# Patient Record
Sex: MALE | Race: WHITE | NOT HISPANIC OR LATINO | Employment: OTHER | URBAN - NONMETROPOLITAN AREA
[De-identification: names, ages, dates, MRNs, and addresses within clinical notes are randomized per-mention and may not be internally consistent; named-entity substitution may affect disease eponyms.]

---

## 2022-01-01 ENCOUNTER — NURSING HOME (OUTPATIENT)
Dept: INTERNAL MEDICINE | Facility: CLINIC | Age: 87
End: 2022-01-01

## 2022-01-01 ENCOUNTER — NURSING HOME (OUTPATIENT)
Dept: FAMILY MEDICINE CLINIC | Facility: CLINIC | Age: 87
End: 2022-01-01

## 2022-01-01 ENCOUNTER — APPOINTMENT (OUTPATIENT)
Dept: GENERAL RADIOLOGY | Facility: HOSPITAL | Age: 87
End: 2022-01-01

## 2022-01-01 ENCOUNTER — APPOINTMENT (OUTPATIENT)
Dept: CARDIOLOGY | Facility: HOSPITAL | Age: 87
End: 2022-01-01

## 2022-01-01 ENCOUNTER — APPOINTMENT (OUTPATIENT)
Dept: CT IMAGING | Facility: HOSPITAL | Age: 87
End: 2022-01-01

## 2022-01-01 ENCOUNTER — HOSPITAL ENCOUNTER (INPATIENT)
Facility: HOSPITAL | Age: 87
LOS: 4 days | End: 2022-08-06
Attending: FAMILY MEDICINE | Admitting: INTERNAL MEDICINE

## 2022-01-01 ENCOUNTER — TRANSCRIBE ORDERS (OUTPATIENT)
Dept: GENERAL RADIOLOGY | Facility: HOSPITAL | Age: 87
End: 2022-01-01

## 2022-01-01 ENCOUNTER — HOSPITAL ENCOUNTER (OUTPATIENT)
Dept: GENERAL RADIOLOGY | Facility: HOSPITAL | Age: 87
Discharge: HOME | End: 2022-07-05
Admitting: NURSE PRACTITIONER

## 2022-01-01 ENCOUNTER — HOSPITAL ENCOUNTER (EMERGENCY)
Facility: HOSPITAL | Age: 87
Discharge: HOME | End: 2022-07-21
Attending: EMERGENCY MEDICINE | Admitting: EMERGENCY MEDICINE

## 2022-01-01 ENCOUNTER — HOSPITAL ENCOUNTER (INPATIENT)
Facility: HOSPITAL | Age: 87
LOS: 20 days | Discharge: NURSING, REHAB OR LONG-TERM CARE | End: 2022-06-14
Attending: EMERGENCY MEDICINE | Admitting: INTERNAL MEDICINE

## 2022-01-01 ENCOUNTER — DOCUMENTATION (OUTPATIENT)
Dept: FAMILY MEDICINE CLINIC | Facility: CLINIC | Age: 87
End: 2022-01-01

## 2022-01-01 VITALS
WEIGHT: 199.8 LBS | SYSTOLIC BLOOD PRESSURE: 128 MMHG | TEMPERATURE: 98 F | RESPIRATION RATE: 18 BRPM | HEART RATE: 74 BPM | DIASTOLIC BLOOD PRESSURE: 72 MMHG | BODY MASS INDEX: 29.49 KG/M2 | OXYGEN SATURATION: 98 %

## 2022-01-01 VITALS
DIASTOLIC BLOOD PRESSURE: 52 MMHG | BODY MASS INDEX: 30.89 KG/M2 | TEMPERATURE: 98.3 F | HEIGHT: 69 IN | WEIGHT: 208.56 LBS | OXYGEN SATURATION: 90 % | SYSTOLIC BLOOD PRESSURE: 115 MMHG | HEART RATE: 71 BPM | RESPIRATION RATE: 24 BRPM

## 2022-01-01 VITALS
RESPIRATION RATE: 18 BRPM | WEIGHT: 210 LBS | OXYGEN SATURATION: 97 % | HEART RATE: 79 BPM | BODY MASS INDEX: 31.1 KG/M2 | HEIGHT: 69 IN | DIASTOLIC BLOOD PRESSURE: 74 MMHG | SYSTOLIC BLOOD PRESSURE: 157 MMHG | TEMPERATURE: 98.4 F

## 2022-01-01 VITALS
BODY MASS INDEX: 29.21 KG/M2 | RESPIRATION RATE: 18 BRPM | SYSTOLIC BLOOD PRESSURE: 128 MMHG | TEMPERATURE: 97.4 F | DIASTOLIC BLOOD PRESSURE: 74 MMHG | HEART RATE: 80 BPM | WEIGHT: 197.9 LBS | OXYGEN SATURATION: 97 %

## 2022-01-01 VITALS
RESPIRATION RATE: 18 BRPM | DIASTOLIC BLOOD PRESSURE: 66 MMHG | BODY MASS INDEX: 29.49 KG/M2 | OXYGEN SATURATION: 94 % | SYSTOLIC BLOOD PRESSURE: 132 MMHG | WEIGHT: 199.8 LBS | TEMPERATURE: 97.4 F | HEART RATE: 70 BPM

## 2022-01-01 VITALS
BODY MASS INDEX: 30.6 KG/M2 | WEIGHT: 206.57 LBS | RESPIRATION RATE: 22 BRPM | DIASTOLIC BLOOD PRESSURE: 53 MMHG | HEART RATE: 87 BPM | SYSTOLIC BLOOD PRESSURE: 141 MMHG | TEMPERATURE: 98.5 F | OXYGEN SATURATION: 95 % | HEIGHT: 69 IN

## 2022-01-01 DIAGNOSIS — E11.9 TYPE 2 DIABETES MELLITUS WITHOUT COMPLICATION, WITHOUT LONG-TERM CURRENT USE OF INSULIN: ICD-10-CM

## 2022-01-01 DIAGNOSIS — J18.9 MULTIFOCAL PNEUMONIA: ICD-10-CM

## 2022-01-01 DIAGNOSIS — Z79.4 TYPE 2 DIABETES MELLITUS WITHOUT COMPLICATION, WITH LONG-TERM CURRENT USE OF INSULIN: ICD-10-CM

## 2022-01-01 DIAGNOSIS — N17.9 AKI (ACUTE KIDNEY INJURY): ICD-10-CM

## 2022-01-01 DIAGNOSIS — U07.1 PNEUMONIA DUE TO COVID-19 VIRUS: Primary | ICD-10-CM

## 2022-01-01 DIAGNOSIS — R06.02 SHORTNESS OF BREATH: ICD-10-CM

## 2022-01-01 DIAGNOSIS — Z86.16 PERSONAL HISTORY OF COVID-19: ICD-10-CM

## 2022-01-01 DIAGNOSIS — J96.01 ACUTE RESPIRATORY FAILURE WITH HYPOXIA: Primary | ICD-10-CM

## 2022-01-01 DIAGNOSIS — Z87.01 HISTORY OF PNEUMONIA: Primary | ICD-10-CM

## 2022-01-01 DIAGNOSIS — R09.02 HYPOXIA: ICD-10-CM

## 2022-01-01 DIAGNOSIS — N40.0 BENIGN PROSTATIC HYPERPLASIA WITHOUT LOWER URINARY TRACT SYMPTOMS: ICD-10-CM

## 2022-01-01 DIAGNOSIS — J40 BRONCHITIS, NOT SPECIFIED AS ACUTE OR CHRONIC: ICD-10-CM

## 2022-01-01 DIAGNOSIS — J18.9 PNEUMONIA OF BOTH LOWER LOBES DUE TO INFECTIOUS ORGANISM: ICD-10-CM

## 2022-01-01 DIAGNOSIS — Z86.16 HISTORY OF COVID-19: ICD-10-CM

## 2022-01-01 DIAGNOSIS — J18.9 PNEUMONIA DUE TO INFECTIOUS ORGANISM, UNSPECIFIED LATERALITY, UNSPECIFIED PART OF LUNG: ICD-10-CM

## 2022-01-01 DIAGNOSIS — I10 ESSENTIAL HYPERTENSION: ICD-10-CM

## 2022-01-01 DIAGNOSIS — J12.82 PNEUMONIA DUE TO COVID-19 VIRUS: Primary | ICD-10-CM

## 2022-01-01 DIAGNOSIS — R53.1 GENERALIZED WEAKNESS: Primary | ICD-10-CM

## 2022-01-01 DIAGNOSIS — I10 ESSENTIAL HYPERTENSION: Primary | ICD-10-CM

## 2022-01-01 DIAGNOSIS — E11.9 TYPE 2 DIABETES MELLITUS WITHOUT COMPLICATION, WITH LONG-TERM CURRENT USE OF INSULIN: ICD-10-CM

## 2022-01-01 DIAGNOSIS — R09.02 HYPOXIA: Primary | ICD-10-CM

## 2022-01-01 DIAGNOSIS — Z87.09 HISTORY OF RESPIRATORY FAILURE: ICD-10-CM

## 2022-01-01 LAB
A-A DO2: 26.2 MMHG
A-A DO2: 31.9 MMHG
A-A DO2: 52.9 MMHG
ALBUMIN SERPL-MCNC: 2.7 G/DL (ref 3.5–5.2)
ALBUMIN SERPL-MCNC: 2.8 G/DL (ref 3.5–5.2)
ALBUMIN SERPL-MCNC: 3 G/DL (ref 3.5–5.2)
ALBUMIN SERPL-MCNC: 3 G/DL (ref 3.5–5.2)
ALBUMIN SERPL-MCNC: 3.1 G/DL (ref 3.5–5.2)
ALBUMIN SERPL-MCNC: 3.1 G/DL (ref 3.5–5.2)
ALBUMIN/GLOB SERPL: 0.8 G/DL
ALBUMIN/GLOB SERPL: 0.8 G/DL
ALBUMIN/GLOB SERPL: 1 G/DL
ALBUMIN/GLOB SERPL: 1.1 G/DL
ALP SERPL-CCNC: 112 U/L (ref 39–117)
ALP SERPL-CCNC: 113 U/L (ref 39–117)
ALP SERPL-CCNC: 59 U/L (ref 39–117)
ALP SERPL-CCNC: 69 U/L (ref 39–117)
ALP SERPL-CCNC: 69 U/L (ref 39–117)
ALP SERPL-CCNC: 71 U/L (ref 39–117)
ALT SERPL W P-5'-P-CCNC: 13 U/L (ref 1–41)
ALT SERPL W P-5'-P-CCNC: 27 U/L (ref 1–41)
ALT SERPL W P-5'-P-CCNC: 29 U/L (ref 1–41)
ALT SERPL W P-5'-P-CCNC: 33 U/L (ref 1–41)
ALT SERPL W P-5'-P-CCNC: 34 U/L (ref 1–41)
ALT SERPL W P-5'-P-CCNC: 35 U/L (ref 1–41)
ANION GAP SERPL CALCULATED.3IONS-SCNC: 10 MMOL/L (ref 5–15)
ANION GAP SERPL CALCULATED.3IONS-SCNC: 10.1 MMOL/L (ref 5–15)
ANION GAP SERPL CALCULATED.3IONS-SCNC: 10.3 MMOL/L (ref 5–15)
ANION GAP SERPL CALCULATED.3IONS-SCNC: 11.1 MMOL/L (ref 5–15)
ANION GAP SERPL CALCULATED.3IONS-SCNC: 11.2 MMOL/L (ref 5–15)
ANION GAP SERPL CALCULATED.3IONS-SCNC: 11.5 MMOL/L (ref 5–15)
ANION GAP SERPL CALCULATED.3IONS-SCNC: 11.5 MMOL/L (ref 5–15)
ANION GAP SERPL CALCULATED.3IONS-SCNC: 11.7 MMOL/L (ref 5–15)
ANION GAP SERPL CALCULATED.3IONS-SCNC: 11.7 MMOL/L (ref 5–15)
ANION GAP SERPL CALCULATED.3IONS-SCNC: 11.8 MMOL/L (ref 5–15)
ANION GAP SERPL CALCULATED.3IONS-SCNC: 12.6 MMOL/L (ref 5–15)
ANION GAP SERPL CALCULATED.3IONS-SCNC: 12.6 MMOL/L (ref 5–15)
ANION GAP SERPL CALCULATED.3IONS-SCNC: 12.8 MMOL/L (ref 5–15)
ANION GAP SERPL CALCULATED.3IONS-SCNC: 15 MMOL/L (ref 5–15)
ANION GAP SERPL CALCULATED.3IONS-SCNC: 15.4 MMOL/L (ref 5–15)
ANION GAP SERPL CALCULATED.3IONS-SCNC: 7.9 MMOL/L (ref 5–15)
ANION GAP SERPL CALCULATED.3IONS-SCNC: 8.7 MMOL/L (ref 5–15)
ARTERIAL PATENCY WRIST A: ABNORMAL
ARTERIAL PATENCY WRIST A: POSITIVE
ARTERIAL PATENCY WRIST A: POSITIVE
AST SERPL-CCNC: 13 U/L (ref 1–40)
AST SERPL-CCNC: 18 U/L (ref 1–40)
AST SERPL-CCNC: 25 U/L (ref 1–40)
AST SERPL-CCNC: 41 U/L (ref 1–40)
AST SERPL-CCNC: 43 U/L (ref 1–40)
AST SERPL-CCNC: 46 U/L (ref 1–40)
ATMOSPHERIC PRESS: 733 MMHG
ATMOSPHERIC PRESS: 734 MMHG
ATMOSPHERIC PRESS: 738 MMHG
BACTERIA BLD CULT: ABNORMAL
BACTERIA SPEC AEROBE CULT: ABNORMAL
BACTERIA SPEC AEROBE CULT: ABNORMAL
BACTERIA SPEC AEROBE CULT: NORMAL
BACTERIA SPEC AEROBE CULT: NORMAL
BACTERIA UR QL AUTO: ABNORMAL /HPF
BACTERIA UR QL AUTO: ABNORMAL /HPF
BASE EXCESS BLDA CALC-SCNC: -0.2 MMOL/L (ref 0–2)
BASE EXCESS BLDA CALC-SCNC: 1.1 MMOL/L (ref 0–2)
BASE EXCESS BLDA CALC-SCNC: 1.6 MMOL/L (ref 0–2)
BASOPHILS # BLD AUTO: 0.02 10*3/MM3 (ref 0–0.2)
BASOPHILS # BLD AUTO: 0.02 10*3/MM3 (ref 0–0.2)
BASOPHILS # BLD AUTO: 0.03 10*3/MM3 (ref 0–0.2)
BASOPHILS # BLD AUTO: 0.05 10*3/MM3 (ref 0–0.2)
BASOPHILS # BLD AUTO: 0.06 10*3/MM3 (ref 0–0.2)
BASOPHILS NFR BLD AUTO: 0.2 % (ref 0–1.5)
BASOPHILS NFR BLD AUTO: 0.3 % (ref 0–1.5)
BASOPHILS NFR BLD AUTO: 0.6 % (ref 0–1.5)
BDY SITE: ABNORMAL
BH CV ECHO MEAS - AO MAX PG: 8.4 MMHG
BH CV ECHO MEAS - AO MEAN PG: 5 MMHG
BH CV ECHO MEAS - AO ROOT DIAM: 3.3 CM
BH CV ECHO MEAS - AO V2 MAX: 145 CM/SEC
BH CV ECHO MEAS - AO V2 VTI: 32.3 CM
BH CV ECHO MEAS - AVA(I,D): 2.6 CM2
BH CV ECHO MEAS - EDV(CUBED): 58 ML
BH CV ECHO MEAS - EDV(MOD-SP2): 76.8 ML
BH CV ECHO MEAS - EDV(MOD-SP4): 83.5 ML
BH CV ECHO MEAS - EF(MOD-BP): 70.2 %
BH CV ECHO MEAS - EF(MOD-SP2): 71.7 %
BH CV ECHO MEAS - EF(MOD-SP4): 66.1 %
BH CV ECHO MEAS - ESV(CUBED): 16.4 ML
BH CV ECHO MEAS - ESV(MOD-SP2): 21.7 ML
BH CV ECHO MEAS - ESV(MOD-SP4): 28.3 ML
BH CV ECHO MEAS - FS: 34.4 %
BH CV ECHO MEAS - IVS/LVPW: 1.26 CM
BH CV ECHO MEAS - IVSD: 1.72 CM
BH CV ECHO MEAS - LA DIMENSION: 2.47 CM
BH CV ECHO MEAS - LAT PEAK E' VEL: 9.7 CM/SEC
BH CV ECHO MEAS - LV MASS(C)D: 231.7 GRAMS
BH CV ECHO MEAS - LV MAX PG: 3.8 MMHG
BH CV ECHO MEAS - LV MEAN PG: 2 MMHG
BH CV ECHO MEAS - LV V1 MAX: 96.9 CM/SEC
BH CV ECHO MEAS - LV V1 VTI: 22.5 CM
BH CV ECHO MEAS - LVIDD: 3.9 CM
BH CV ECHO MEAS - LVIDS: 2.5 CM
BH CV ECHO MEAS - LVOT AREA: 3.7 CM2
BH CV ECHO MEAS - LVOT DIAM: 2.16 CM
BH CV ECHO MEAS - LVPWD: 1.36 CM
BH CV ECHO MEAS - MED PEAK E' VEL: 4.9 CM/SEC
BH CV ECHO MEAS - MV A MAX VEL: 94.8 CM/SEC
BH CV ECHO MEAS - MV DEC SLOPE: 369 CM/SEC2
BH CV ECHO MEAS - MV DEC TIME: 0.16 MSEC
BH CV ECHO MEAS - MV E MAX VEL: 83.7 CM/SEC
BH CV ECHO MEAS - MV E/A: 0.88
BH CV ECHO MEAS - MV MAX PG: 4.2 MMHG
BH CV ECHO MEAS - MV MEAN PG: 2 MMHG
BH CV ECHO MEAS - MV P1/2T: 90.5 MSEC
BH CV ECHO MEAS - MV V2 VTI: 31.7 CM
BH CV ECHO MEAS - MVA(P1/2T): 2.43 CM2
BH CV ECHO MEAS - MVA(VTI): 2.6 CM2
BH CV ECHO MEAS - PA V2 MAX: 115 CM/SEC
BH CV ECHO MEAS - RAP SYSTOLE: 3 MMHG
BH CV ECHO MEAS - RV MAX PG: 3.4 MMHG
BH CV ECHO MEAS - RV V1 MAX: 92.2 CM/SEC
BH CV ECHO MEAS - RV V1 VTI: 20.3 CM
BH CV ECHO MEAS - SV(LVOT): 82.4 ML
BH CV ECHO MEAS - SV(MOD-SP2): 55.1 ML
BH CV ECHO MEAS - SV(MOD-SP4): 55.2 ML
BH CV ECHO MEAS - TAPSE (>1.6): 2.12 CM
BH CV ECHO MEASUREMENTS AVERAGE E/E' RATIO: 11.47
BH CV XLRA - RV BASE: 3.1 CM
BH CV XLRA - RV LENGTH: 7.4 CM
BH CV XLRA - TDI S': 10.3 CM/SEC
BILIRUB SERPL-MCNC: 0.3 MG/DL (ref 0–1.2)
BILIRUB SERPL-MCNC: 0.4 MG/DL (ref 0–1.2)
BILIRUB SERPL-MCNC: 0.5 MG/DL (ref 0–1.2)
BILIRUB SERPL-MCNC: 0.6 MG/DL (ref 0–1.2)
BILIRUB UR QL STRIP: NEGATIVE
BILIRUB UR QL STRIP: NEGATIVE
BUN SERPL-MCNC: 15 MG/DL (ref 8–23)
BUN SERPL-MCNC: 18 MG/DL (ref 8–23)
BUN SERPL-MCNC: 21 MG/DL (ref 8–23)
BUN SERPL-MCNC: 23 MG/DL (ref 8–23)
BUN SERPL-MCNC: 24 MG/DL (ref 8–23)
BUN SERPL-MCNC: 28 MG/DL (ref 8–23)
BUN SERPL-MCNC: 29 MG/DL (ref 8–23)
BUN SERPL-MCNC: 29 MG/DL (ref 8–23)
BUN SERPL-MCNC: 32 MG/DL (ref 8–23)
BUN SERPL-MCNC: 35 MG/DL (ref 8–23)
BUN SERPL-MCNC: 39 MG/DL (ref 8–23)
BUN SERPL-MCNC: 43 MG/DL (ref 8–23)
BUN SERPL-MCNC: 43 MG/DL (ref 8–23)
BUN SERPL-MCNC: 44 MG/DL (ref 8–23)
BUN SERPL-MCNC: 59 MG/DL (ref 8–23)
BUN SERPL-MCNC: 74 MG/DL (ref 8–23)
BUN SERPL-MCNC: 79 MG/DL (ref 8–23)
BUN/CREAT SERPL: 14.4 (ref 7–25)
BUN/CREAT SERPL: 14.9 (ref 7–25)
BUN/CREAT SERPL: 16.1 (ref 7–25)
BUN/CREAT SERPL: 17.2 (ref 7–25)
BUN/CREAT SERPL: 18 (ref 7–25)
BUN/CREAT SERPL: 24.2 (ref 7–25)
BUN/CREAT SERPL: 27.5 (ref 7–25)
BUN/CREAT SERPL: 27.6 (ref 7–25)
BUN/CREAT SERPL: 28.5 (ref 7–25)
BUN/CREAT SERPL: 30.2 (ref 7–25)
BUN/CREAT SERPL: 36.1 (ref 7–25)
BUN/CREAT SERPL: 36.1 (ref 7–25)
BUN/CREAT SERPL: 36.4 (ref 7–25)
BUN/CREAT SERPL: 37.9 (ref 7–25)
BUN/CREAT SERPL: 47.2 (ref 7–25)
BUN/CREAT SERPL: 50 (ref 7–25)
BUN/CREAT SERPL: 57.7 (ref 7–25)
CALCIUM SPEC-SCNC: 7.8 MG/DL (ref 8.6–10.5)
CALCIUM SPEC-SCNC: 7.8 MG/DL (ref 8.6–10.5)
CALCIUM SPEC-SCNC: 7.9 MG/DL (ref 8.6–10.5)
CALCIUM SPEC-SCNC: 8.1 MG/DL (ref 8.6–10.5)
CALCIUM SPEC-SCNC: 8.4 MG/DL (ref 8.6–10.5)
CALCIUM SPEC-SCNC: 8.4 MG/DL (ref 8.6–10.5)
CALCIUM SPEC-SCNC: 8.6 MG/DL (ref 8.6–10.5)
CALCIUM SPEC-SCNC: 8.6 MG/DL (ref 8.6–10.5)
CALCIUM SPEC-SCNC: 8.7 MG/DL (ref 8.6–10.5)
CALCIUM SPEC-SCNC: 8.7 MG/DL (ref 8.6–10.5)
CALCIUM SPEC-SCNC: 8.8 MG/DL (ref 8.6–10.5)
CALCIUM SPEC-SCNC: 8.8 MG/DL (ref 8.6–10.5)
CALCIUM SPEC-SCNC: 8.9 MG/DL (ref 8.6–10.5)
CALCIUM SPEC-SCNC: 9.2 MG/DL (ref 8.6–10.5)
CALCIUM SPEC-SCNC: 9.2 MG/DL (ref 8.6–10.5)
CHLORIDE SERPL-SCNC: 101 MMOL/L (ref 98–107)
CHLORIDE SERPL-SCNC: 101 MMOL/L (ref 98–107)
CHLORIDE SERPL-SCNC: 103 MMOL/L (ref 98–107)
CHLORIDE SERPL-SCNC: 104 MMOL/L (ref 98–107)
CHLORIDE SERPL-SCNC: 105 MMOL/L (ref 98–107)
CHLORIDE SERPL-SCNC: 106 MMOL/L (ref 98–107)
CHLORIDE SERPL-SCNC: 107 MMOL/L (ref 98–107)
CHLORIDE SERPL-SCNC: 108 MMOL/L (ref 98–107)
CHLORIDE SERPL-SCNC: 99 MMOL/L (ref 98–107)
CHLORIDE SERPL-SCNC: 99 MMOL/L (ref 98–107)
CLARITY UR: CLEAR
CLARITY UR: CLEAR
CO2 SERPL-SCNC: 19.6 MMOL/L (ref 22–29)
CO2 SERPL-SCNC: 20 MMOL/L (ref 22–29)
CO2 SERPL-SCNC: 20.9 MMOL/L (ref 22–29)
CO2 SERPL-SCNC: 21.2 MMOL/L (ref 22–29)
CO2 SERPL-SCNC: 21.5 MMOL/L (ref 22–29)
CO2 SERPL-SCNC: 21.7 MMOL/L (ref 22–29)
CO2 SERPL-SCNC: 22.2 MMOL/L (ref 22–29)
CO2 SERPL-SCNC: 22.4 MMOL/L (ref 22–29)
CO2 SERPL-SCNC: 22.4 MMOL/L (ref 22–29)
CO2 SERPL-SCNC: 23 MMOL/L (ref 22–29)
CO2 SERPL-SCNC: 23.3 MMOL/L (ref 22–29)
CO2 SERPL-SCNC: 23.3 MMOL/L (ref 22–29)
CO2 SERPL-SCNC: 23.5 MMOL/L (ref 22–29)
CO2 SERPL-SCNC: 24.1 MMOL/L (ref 22–29)
CO2 SERPL-SCNC: 24.3 MMOL/L (ref 22–29)
CO2 SERPL-SCNC: 24.8 MMOL/L (ref 22–29)
CO2 SERPL-SCNC: 25.9 MMOL/L (ref 22–29)
COHGB MFR BLD: 1.2 % (ref 0–2)
COHGB MFR BLD: 1.5 % (ref 0–2)
COHGB MFR BLD: 1.5 % (ref 0–2)
COLOR UR: YELLOW
COLOR UR: YELLOW
CREAT SERPL-MCNC: 0.96 MG/DL (ref 0.76–1.27)
CREAT SERPL-MCNC: 1 MG/DL (ref 0.76–1.27)
CREAT SERPL-MCNC: 1.02 MG/DL (ref 0.76–1.27)
CREAT SERPL-MCNC: 1.03 MG/DL (ref 0.76–1.27)
CREAT SERPL-MCNC: 1.04 MG/DL (ref 0.76–1.27)
CREAT SERPL-MCNC: 1.05 MG/DL (ref 0.76–1.27)
CREAT SERPL-MCNC: 1.18 MG/DL (ref 0.76–1.27)
CREAT SERPL-MCNC: 1.19 MG/DL (ref 0.76–1.27)
CREAT SERPL-MCNC: 1.22 MG/DL (ref 0.76–1.27)
CREAT SERPL-MCNC: 1.23 MG/DL (ref 0.76–1.27)
CREAT SERPL-MCNC: 1.25 MG/DL (ref 0.76–1.27)
CREAT SERPL-MCNC: 1.32 MG/DL (ref 0.76–1.27)
CREAT SERPL-MCNC: 1.34 MG/DL (ref 0.76–1.27)
CREAT SERPL-MCNC: 1.37 MG/DL (ref 0.76–1.27)
CREAT SERPL-MCNC: 1.41 MG/DL (ref 0.76–1.27)
CREAT SERPL-MCNC: 1.48 MG/DL (ref 0.76–1.27)
CREAT SERPL-MCNC: 1.49 MG/DL (ref 0.76–1.27)
CRP SERPL-MCNC: 13.6 MG/DL (ref 0–0.5)
CRP SERPL-MCNC: 17.36 MG/DL (ref 0–0.5)
CRP SERPL-MCNC: 9.52 MG/DL (ref 0–0.5)
D DIMER PPP FEU-MCNC: 0.64 MCGFEU/ML (ref 0–0.57)
D DIMER PPP FEU-MCNC: 1.17 MCGFEU/ML (ref 0–0.57)
D-LACTATE SERPL-SCNC: 1.4 MMOL/L (ref 0.5–2)
D-LACTATE SERPL-SCNC: 2.1 MMOL/L (ref 0.5–2)
D-LACTATE SERPL-SCNC: 3.3 MMOL/L (ref 0.5–2)
DEPRECATED RDW RBC AUTO: 39.3 FL (ref 37–54)
DEPRECATED RDW RBC AUTO: 39.4 FL (ref 37–54)
DEPRECATED RDW RBC AUTO: 40.4 FL (ref 37–54)
DEPRECATED RDW RBC AUTO: 40.7 FL (ref 37–54)
DEPRECATED RDW RBC AUTO: 41 FL (ref 37–54)
DEPRECATED RDW RBC AUTO: 41.9 FL (ref 37–54)
DEPRECATED RDW RBC AUTO: 42.4 FL (ref 37–54)
DEPRECATED RDW RBC AUTO: 42.8 FL (ref 37–54)
DEPRECATED RDW RBC AUTO: 43.9 FL (ref 37–54)
DEPRECATED RDW RBC AUTO: 50.6 FL (ref 37–54)
DEPRECATED RDW RBC AUTO: 51.2 FL (ref 37–54)
DEPRECATED RDW RBC AUTO: 51.5 FL (ref 37–54)
DEPRECATED RDW RBC AUTO: 51.7 FL (ref 37–54)
DEPRECATED RDW RBC AUTO: 51.8 FL (ref 37–54)
EGFRCR SERPLBLD CKD-EPI 2021: 44.6 ML/MIN/1.73
EGFRCR SERPLBLD CKD-EPI 2021: 44.9 ML/MIN/1.73
EGFRCR SERPLBLD CKD-EPI 2021: 47.6 ML/MIN/1.73
EGFRCR SERPLBLD CKD-EPI 2021: 49.3 ML/MIN/1.73
EGFRCR SERPLBLD CKD-EPI 2021: 50.6 ML/MIN/1.73
EGFRCR SERPLBLD CKD-EPI 2021: 51.6 ML/MIN/1.73
EGFRCR SERPLBLD CKD-EPI 2021: 55 ML/MIN/1.73
EGFRCR SERPLBLD CKD-EPI 2021: 56.1 ML/MIN/1.73
EGFRCR SERPLBLD CKD-EPI 2021: 56.7 ML/MIN/1.73
EGFRCR SERPLBLD CKD-EPI 2021: 58.4 ML/MIN/1.73
EGFRCR SERPLBLD CKD-EPI 2021: 59 ML/MIN/1.73
EGFRCR SERPLBLD CKD-EPI 2021: 67.9 ML/MIN/1.73
EGFRCR SERPLBLD CKD-EPI 2021: 68.6 ML/MIN/1.73
EGFRCR SERPLBLD CKD-EPI 2021: 69.4 ML/MIN/1.73
EGFRCR SERPLBLD CKD-EPI 2021: 70.3 ML/MIN/1.73
EGFRCR SERPLBLD CKD-EPI 2021: 71.9 ML/MIN/1.73
EGFRCR SERPLBLD CKD-EPI 2021: 75.6 ML/MIN/1.73
EOSINOPHIL # BLD AUTO: 0 10*3/MM3 (ref 0–0.4)
EOSINOPHIL # BLD AUTO: 0.22 10*3/MM3 (ref 0–0.4)
EOSINOPHIL NFR BLD AUTO: 0 % (ref 0.3–6.2)
EOSINOPHIL NFR BLD AUTO: 2.4 % (ref 0.3–6.2)
ERYTHROCYTE [DISTWIDTH] IN BLOOD BY AUTOMATED COUNT: 12.2 % (ref 12.3–15.4)
ERYTHROCYTE [DISTWIDTH] IN BLOOD BY AUTOMATED COUNT: 12.4 % (ref 12.3–15.4)
ERYTHROCYTE [DISTWIDTH] IN BLOOD BY AUTOMATED COUNT: 12.5 % (ref 12.3–15.4)
ERYTHROCYTE [DISTWIDTH] IN BLOOD BY AUTOMATED COUNT: 12.8 % (ref 12.3–15.4)
ERYTHROCYTE [DISTWIDTH] IN BLOOD BY AUTOMATED COUNT: 12.9 % (ref 12.3–15.4)
ERYTHROCYTE [DISTWIDTH] IN BLOOD BY AUTOMATED COUNT: 12.9 % (ref 12.3–15.4)
ERYTHROCYTE [DISTWIDTH] IN BLOOD BY AUTOMATED COUNT: 13 % (ref 12.3–15.4)
ERYTHROCYTE [DISTWIDTH] IN BLOOD BY AUTOMATED COUNT: 13 % (ref 12.3–15.4)
ERYTHROCYTE [DISTWIDTH] IN BLOOD BY AUTOMATED COUNT: 13.1 % (ref 12.3–15.4)
ERYTHROCYTE [DISTWIDTH] IN BLOOD BY AUTOMATED COUNT: 15.6 % (ref 12.3–15.4)
ERYTHROCYTE [DISTWIDTH] IN BLOOD BY AUTOMATED COUNT: 15.8 % (ref 12.3–15.4)
ERYTHROCYTE [DISTWIDTH] IN BLOOD BY AUTOMATED COUNT: 15.8 % (ref 12.3–15.4)
ERYTHROCYTE [DISTWIDTH] IN BLOOD BY AUTOMATED COUNT: 15.9 % (ref 12.3–15.4)
ERYTHROCYTE [DISTWIDTH] IN BLOOD BY AUTOMATED COUNT: 16 % (ref 12.3–15.4)
FERRITIN SERPL-MCNC: 1497 NG/ML (ref 30–400)
GAS FLOW AIRWAY: 10 LPM
GAS FLOW AIRWAY: 6 LPM
GLOBULIN UR ELPH-MCNC: 2.5 GM/DL
GLOBULIN UR ELPH-MCNC: 2.7 GM/DL
GLOBULIN UR ELPH-MCNC: 2.9 GM/DL
GLOBULIN UR ELPH-MCNC: 3 GM/DL
GLOBULIN UR ELPH-MCNC: 3.4 GM/DL
GLOBULIN UR ELPH-MCNC: 4 GM/DL
GLUCOSE BLDC GLUCOMTR-MCNC: 104 MG/DL (ref 70–130)
GLUCOSE BLDC GLUCOMTR-MCNC: 107 MG/DL (ref 70–130)
GLUCOSE BLDC GLUCOMTR-MCNC: 107 MG/DL (ref 70–130)
GLUCOSE BLDC GLUCOMTR-MCNC: 110 MG/DL (ref 70–130)
GLUCOSE BLDC GLUCOMTR-MCNC: 112 MG/DL (ref 70–130)
GLUCOSE BLDC GLUCOMTR-MCNC: 113 MG/DL (ref 70–130)
GLUCOSE BLDC GLUCOMTR-MCNC: 125 MG/DL (ref 70–130)
GLUCOSE BLDC GLUCOMTR-MCNC: 127 MG/DL (ref 70–130)
GLUCOSE BLDC GLUCOMTR-MCNC: 128 MG/DL (ref 70–130)
GLUCOSE BLDC GLUCOMTR-MCNC: 129 MG/DL (ref 70–130)
GLUCOSE BLDC GLUCOMTR-MCNC: 131 MG/DL (ref 70–130)
GLUCOSE BLDC GLUCOMTR-MCNC: 132 MG/DL (ref 70–130)
GLUCOSE BLDC GLUCOMTR-MCNC: 134 MG/DL (ref 70–130)
GLUCOSE BLDC GLUCOMTR-MCNC: 136 MG/DL (ref 70–130)
GLUCOSE BLDC GLUCOMTR-MCNC: 139 MG/DL (ref 70–130)
GLUCOSE BLDC GLUCOMTR-MCNC: 139 MG/DL (ref 70–130)
GLUCOSE BLDC GLUCOMTR-MCNC: 142 MG/DL (ref 70–130)
GLUCOSE BLDC GLUCOMTR-MCNC: 146 MG/DL (ref 70–130)
GLUCOSE BLDC GLUCOMTR-MCNC: 146 MG/DL (ref 70–130)
GLUCOSE BLDC GLUCOMTR-MCNC: 147 MG/DL (ref 70–130)
GLUCOSE BLDC GLUCOMTR-MCNC: 151 MG/DL (ref 70–130)
GLUCOSE BLDC GLUCOMTR-MCNC: 151 MG/DL (ref 70–130)
GLUCOSE BLDC GLUCOMTR-MCNC: 152 MG/DL (ref 70–130)
GLUCOSE BLDC GLUCOMTR-MCNC: 157 MG/DL (ref 70–130)
GLUCOSE BLDC GLUCOMTR-MCNC: 160 MG/DL (ref 70–130)
GLUCOSE BLDC GLUCOMTR-MCNC: 161 MG/DL (ref 70–130)
GLUCOSE BLDC GLUCOMTR-MCNC: 163 MG/DL (ref 70–130)
GLUCOSE BLDC GLUCOMTR-MCNC: 165 MG/DL (ref 70–130)
GLUCOSE BLDC GLUCOMTR-MCNC: 167 MG/DL (ref 70–130)
GLUCOSE BLDC GLUCOMTR-MCNC: 169 MG/DL (ref 70–130)
GLUCOSE BLDC GLUCOMTR-MCNC: 169 MG/DL (ref 70–130)
GLUCOSE BLDC GLUCOMTR-MCNC: 176 MG/DL (ref 70–130)
GLUCOSE BLDC GLUCOMTR-MCNC: 176 MG/DL (ref 70–130)
GLUCOSE BLDC GLUCOMTR-MCNC: 177 MG/DL (ref 70–130)
GLUCOSE BLDC GLUCOMTR-MCNC: 177 MG/DL (ref 70–130)
GLUCOSE BLDC GLUCOMTR-MCNC: 178 MG/DL (ref 70–130)
GLUCOSE BLDC GLUCOMTR-MCNC: 179 MG/DL (ref 70–130)
GLUCOSE BLDC GLUCOMTR-MCNC: 185 MG/DL (ref 70–130)
GLUCOSE BLDC GLUCOMTR-MCNC: 194 MG/DL (ref 70–130)
GLUCOSE BLDC GLUCOMTR-MCNC: 196 MG/DL (ref 70–130)
GLUCOSE BLDC GLUCOMTR-MCNC: 197 MG/DL (ref 70–130)
GLUCOSE BLDC GLUCOMTR-MCNC: 202 MG/DL (ref 70–130)
GLUCOSE BLDC GLUCOMTR-MCNC: 204 MG/DL (ref 70–130)
GLUCOSE BLDC GLUCOMTR-MCNC: 206 MG/DL (ref 70–130)
GLUCOSE BLDC GLUCOMTR-MCNC: 206 MG/DL (ref 70–130)
GLUCOSE BLDC GLUCOMTR-MCNC: 210 MG/DL (ref 70–130)
GLUCOSE BLDC GLUCOMTR-MCNC: 210 MG/DL (ref 70–130)
GLUCOSE BLDC GLUCOMTR-MCNC: 215 MG/DL (ref 70–130)
GLUCOSE BLDC GLUCOMTR-MCNC: 217 MG/DL (ref 70–130)
GLUCOSE BLDC GLUCOMTR-MCNC: 223 MG/DL (ref 70–130)
GLUCOSE BLDC GLUCOMTR-MCNC: 224 MG/DL (ref 70–130)
GLUCOSE BLDC GLUCOMTR-MCNC: 229 MG/DL (ref 70–130)
GLUCOSE BLDC GLUCOMTR-MCNC: 234 MG/DL (ref 70–130)
GLUCOSE BLDC GLUCOMTR-MCNC: 237 MG/DL (ref 70–130)
GLUCOSE BLDC GLUCOMTR-MCNC: 237 MG/DL (ref 70–130)
GLUCOSE BLDC GLUCOMTR-MCNC: 238 MG/DL (ref 70–130)
GLUCOSE BLDC GLUCOMTR-MCNC: 263 MG/DL (ref 70–130)
GLUCOSE BLDC GLUCOMTR-MCNC: 267 MG/DL (ref 70–130)
GLUCOSE BLDC GLUCOMTR-MCNC: 267 MG/DL (ref 70–130)
GLUCOSE BLDC GLUCOMTR-MCNC: 268 MG/DL (ref 70–130)
GLUCOSE BLDC GLUCOMTR-MCNC: 268 MG/DL (ref 70–130)
GLUCOSE BLDC GLUCOMTR-MCNC: 274 MG/DL (ref 70–130)
GLUCOSE BLDC GLUCOMTR-MCNC: 274 MG/DL (ref 70–130)
GLUCOSE BLDC GLUCOMTR-MCNC: 280 MG/DL (ref 70–130)
GLUCOSE BLDC GLUCOMTR-MCNC: 285 MG/DL (ref 70–130)
GLUCOSE BLDC GLUCOMTR-MCNC: 286 MG/DL (ref 70–130)
GLUCOSE BLDC GLUCOMTR-MCNC: 286 MG/DL (ref 70–130)
GLUCOSE BLDC GLUCOMTR-MCNC: 298 MG/DL (ref 70–130)
GLUCOSE BLDC GLUCOMTR-MCNC: 303 MG/DL (ref 70–130)
GLUCOSE BLDC GLUCOMTR-MCNC: 305 MG/DL (ref 70–130)
GLUCOSE BLDC GLUCOMTR-MCNC: 313 MG/DL (ref 70–130)
GLUCOSE BLDC GLUCOMTR-MCNC: 313 MG/DL (ref 70–130)
GLUCOSE BLDC GLUCOMTR-MCNC: 317 MG/DL (ref 70–130)
GLUCOSE BLDC GLUCOMTR-MCNC: 317 MG/DL (ref 70–130)
GLUCOSE BLDC GLUCOMTR-MCNC: 322 MG/DL (ref 70–130)
GLUCOSE BLDC GLUCOMTR-MCNC: 328 MG/DL (ref 70–130)
GLUCOSE BLDC GLUCOMTR-MCNC: 330 MG/DL (ref 70–130)
GLUCOSE BLDC GLUCOMTR-MCNC: 341 MG/DL (ref 70–130)
GLUCOSE BLDC GLUCOMTR-MCNC: 347 MG/DL (ref 70–130)
GLUCOSE BLDC GLUCOMTR-MCNC: 356 MG/DL (ref 70–130)
GLUCOSE BLDC GLUCOMTR-MCNC: 358 MG/DL (ref 70–130)
GLUCOSE BLDC GLUCOMTR-MCNC: 360 MG/DL (ref 70–130)
GLUCOSE BLDC GLUCOMTR-MCNC: 364 MG/DL (ref 70–130)
GLUCOSE BLDC GLUCOMTR-MCNC: 369 MG/DL (ref 70–130)
GLUCOSE BLDC GLUCOMTR-MCNC: 373 MG/DL (ref 70–130)
GLUCOSE BLDC GLUCOMTR-MCNC: 379 MG/DL (ref 70–130)
GLUCOSE BLDC GLUCOMTR-MCNC: 391 MG/DL (ref 70–130)
GLUCOSE BLDC GLUCOMTR-MCNC: 403 MG/DL (ref 70–130)
GLUCOSE BLDC GLUCOMTR-MCNC: 414 MG/DL (ref 70–130)
GLUCOSE BLDC GLUCOMTR-MCNC: 454 MG/DL (ref 70–130)
GLUCOSE BLDC GLUCOMTR-MCNC: 461 MG/DL (ref 70–130)
GLUCOSE BLDC GLUCOMTR-MCNC: 461 MG/DL (ref 70–130)
GLUCOSE BLDC GLUCOMTR-MCNC: 530 MG/DL (ref 70–130)
GLUCOSE BLDC GLUCOMTR-MCNC: 561 MG/DL (ref 70–130)
GLUCOSE BLDC GLUCOMTR-MCNC: 63 MG/DL (ref 70–130)
GLUCOSE BLDC GLUCOMTR-MCNC: 71 MG/DL (ref 70–130)
GLUCOSE BLDC GLUCOMTR-MCNC: 96 MG/DL (ref 70–130)
GLUCOSE SERPL-MCNC: 105 MG/DL (ref 65–99)
GLUCOSE SERPL-MCNC: 117 MG/DL (ref 65–99)
GLUCOSE SERPL-MCNC: 119 MG/DL (ref 65–99)
GLUCOSE SERPL-MCNC: 133 MG/DL (ref 65–99)
GLUCOSE SERPL-MCNC: 143 MG/DL (ref 65–99)
GLUCOSE SERPL-MCNC: 161 MG/DL (ref 65–99)
GLUCOSE SERPL-MCNC: 166 MG/DL (ref 65–99)
GLUCOSE SERPL-MCNC: 173 MG/DL (ref 65–99)
GLUCOSE SERPL-MCNC: 190 MG/DL (ref 65–99)
GLUCOSE SERPL-MCNC: 214 MG/DL (ref 65–99)
GLUCOSE SERPL-MCNC: 219 MG/DL (ref 65–99)
GLUCOSE SERPL-MCNC: 237 MG/DL (ref 65–99)
GLUCOSE SERPL-MCNC: 272 MG/DL (ref 65–99)
GLUCOSE SERPL-MCNC: 278 MG/DL (ref 65–99)
GLUCOSE SERPL-MCNC: 282 MG/DL (ref 65–99)
GLUCOSE SERPL-MCNC: 302 MG/DL (ref 65–99)
GLUCOSE SERPL-MCNC: 69 MG/DL (ref 65–99)
GLUCOSE UR STRIP-MCNC: NEGATIVE MG/DL
GLUCOSE UR STRIP-MCNC: NEGATIVE MG/DL
GRAM STN SPEC: ABNORMAL
GRAN CASTS URNS QL MICRO: ABNORMAL /LPF
HBA1C MFR BLD: 8.4 % (ref 4.8–5.6)
HCO3 BLDA-SCNC: 22.7 MMOL/L (ref 22–28)
HCO3 BLDA-SCNC: 25.8 MMOL/L (ref 22–28)
HCO3 BLDA-SCNC: 26.3 MMOL/L (ref 22–28)
HCT VFR BLD AUTO: 25.1 % (ref 37.5–51)
HCT VFR BLD AUTO: 25.2 % (ref 37.5–51)
HCT VFR BLD AUTO: 26.1 % (ref 37.5–51)
HCT VFR BLD AUTO: 28.4 % (ref 37.5–51)
HCT VFR BLD AUTO: 28.4 % (ref 37.5–51)
HCT VFR BLD AUTO: 30.1 % (ref 37.5–51)
HCT VFR BLD AUTO: 30.8 % (ref 37.5–51)
HCT VFR BLD AUTO: 33.2 % (ref 37.5–51)
HCT VFR BLD AUTO: 33.4 % (ref 37.5–51)
HCT VFR BLD AUTO: 33.7 % (ref 37.5–51)
HCT VFR BLD AUTO: 35.2 % (ref 37.5–51)
HCT VFR BLD AUTO: 35.4 % (ref 37.5–51)
HCT VFR BLD AUTO: 36.7 % (ref 37.5–51)
HCT VFR BLD AUTO: 36.7 % (ref 37.5–51)
HCT VFR BLD CALC: 25.7 %
HCT VFR BLD CALC: 26.6 %
HCT VFR BLD CALC: 37.2 %
HGB BLD-MCNC: 10.3 G/DL (ref 13–17.7)
HGB BLD-MCNC: 10.8 G/DL (ref 13–17.7)
HGB BLD-MCNC: 11.3 G/DL (ref 13–17.7)
HGB BLD-MCNC: 11.3 G/DL (ref 13–17.7)
HGB BLD-MCNC: 11.4 G/DL (ref 13–17.7)
HGB BLD-MCNC: 12 G/DL (ref 13–17.7)
HGB BLD-MCNC: 12.1 G/DL (ref 13–17.7)
HGB BLD-MCNC: 12.2 G/DL (ref 13–17.7)
HGB BLD-MCNC: 12.5 G/DL (ref 13–17.7)
HGB BLD-MCNC: 7.7 G/DL (ref 13–17.7)
HGB BLD-MCNC: 8 G/DL (ref 13–17.7)
HGB BLD-MCNC: 8.5 G/DL (ref 13–17.7)
HGB BLD-MCNC: 9 G/DL (ref 13–17.7)
HGB BLD-MCNC: 9 G/DL (ref 13–17.7)
HGB UR QL STRIP.AUTO: NEGATIVE
HGB UR QL STRIP.AUTO: NEGATIVE
HOLD SPECIMEN: NORMAL
HYALINE CASTS UR QL AUTO: ABNORMAL /LPF
HYALINE CASTS UR QL AUTO: ABNORMAL /LPF
IMM GRANULOCYTES # BLD AUTO: 0.08 10*3/MM3 (ref 0–0.05)
IMM GRANULOCYTES # BLD AUTO: 0.09 10*3/MM3 (ref 0–0.05)
IMM GRANULOCYTES # BLD AUTO: 0.13 10*3/MM3 (ref 0–0.05)
IMM GRANULOCYTES # BLD AUTO: 0.18 10*3/MM3 (ref 0–0.05)
IMM GRANULOCYTES # BLD AUTO: 0.19 10*3/MM3 (ref 0–0.05)
IMM GRANULOCYTES # BLD AUTO: 0.22 10*3/MM3 (ref 0–0.05)
IMM GRANULOCYTES # BLD AUTO: 0.23 10*3/MM3 (ref 0–0.05)
IMM GRANULOCYTES NFR BLD AUTO: 0.7 % (ref 0–0.5)
IMM GRANULOCYTES NFR BLD AUTO: 0.8 % (ref 0–0.5)
IMM GRANULOCYTES NFR BLD AUTO: 0.9 % (ref 0–0.5)
IMM GRANULOCYTES NFR BLD AUTO: 1.3 % (ref 0–0.5)
IMM GRANULOCYTES NFR BLD AUTO: 2 % (ref 0–0.5)
IMM GRANULOCYTES NFR BLD AUTO: 2.1 % (ref 0–0.5)
IMM GRANULOCYTES NFR BLD AUTO: 2.7 % (ref 0–0.5)
INHALED O2 CONCENTRATION: 21 %
ISOLATED FROM: ABNORMAL
ISOLATED FROM: ABNORMAL
KETONES UR QL STRIP: NEGATIVE
KETONES UR QL STRIP: NEGATIVE
LDH SERPL-CCNC: 343 U/L (ref 135–225)
LEUKOCYTE ESTERASE UR QL STRIP.AUTO: ABNORMAL
LEUKOCYTE ESTERASE UR QL STRIP.AUTO: ABNORMAL
LIPASE SERPL-CCNC: 20 U/L (ref 13–60)
LYMPHOCYTES # BLD AUTO: 1.09 10*3/MM3 (ref 0.7–3.1)
LYMPHOCYTES # BLD AUTO: 1.19 10*3/MM3 (ref 0.7–3.1)
LYMPHOCYTES # BLD AUTO: 1.33 10*3/MM3 (ref 0.7–3.1)
LYMPHOCYTES # BLD AUTO: 1.68 10*3/MM3 (ref 0.7–3.1)
LYMPHOCYTES # BLD AUTO: 1.91 10*3/MM3 (ref 0.7–3.1)
LYMPHOCYTES # BLD AUTO: 2.08 10*3/MM3 (ref 0.7–3.1)
LYMPHOCYTES # BLD AUTO: 2.41 10*3/MM3 (ref 0.7–3.1)
LYMPHOCYTES NFR BLD AUTO: 11.4 % (ref 19.6–45.3)
LYMPHOCYTES NFR BLD AUTO: 11.4 % (ref 19.6–45.3)
LYMPHOCYTES NFR BLD AUTO: 19.8 % (ref 19.6–45.3)
LYMPHOCYTES NFR BLD AUTO: 22.4 % (ref 19.6–45.3)
LYMPHOCYTES NFR BLD AUTO: 27.9 % (ref 19.6–45.3)
LYMPHOCYTES NFR BLD AUTO: 8.1 % (ref 19.6–45.3)
LYMPHOCYTES NFR BLD AUTO: 9.3 % (ref 19.6–45.3)
Lab: ABNORMAL
MAXIMAL PREDICTED HEART RATE: 131 BPM
MCH RBC QN AUTO: 27 PG (ref 26.6–33)
MCH RBC QN AUTO: 28.2 PG (ref 26.6–33)
MCH RBC QN AUTO: 28.2 PG (ref 26.6–33)
MCH RBC QN AUTO: 28.3 PG (ref 26.6–33)
MCH RBC QN AUTO: 28.9 PG (ref 26.6–33)
MCH RBC QN AUTO: 29.8 PG (ref 26.6–33)
MCH RBC QN AUTO: 29.9 PG (ref 26.6–33)
MCH RBC QN AUTO: 29.9 PG (ref 26.6–33)
MCH RBC QN AUTO: 30.1 PG (ref 26.6–33)
MCH RBC QN AUTO: 30.2 PG (ref 26.6–33)
MCH RBC QN AUTO: 30.3 PG (ref 26.6–33)
MCH RBC QN AUTO: 30.5 PG (ref 26.6–33)
MCH RBC QN AUTO: 30.6 PG (ref 26.6–33)
MCH RBC QN AUTO: 30.7 PG (ref 26.6–33)
MCHC RBC AUTO-ENTMCNC: 30.6 G/DL (ref 31.5–35.7)
MCHC RBC AUTO-ENTMCNC: 31.7 G/DL (ref 31.5–35.7)
MCHC RBC AUTO-ENTMCNC: 31.7 G/DL (ref 31.5–35.7)
MCHC RBC AUTO-ENTMCNC: 31.9 G/DL (ref 31.5–35.7)
MCHC RBC AUTO-ENTMCNC: 32.6 G/DL (ref 31.5–35.7)
MCHC RBC AUTO-ENTMCNC: 33.2 G/DL (ref 31.5–35.7)
MCHC RBC AUTO-ENTMCNC: 33.5 G/DL (ref 31.5–35.7)
MCHC RBC AUTO-ENTMCNC: 33.8 G/DL (ref 31.5–35.7)
MCHC RBC AUTO-ENTMCNC: 33.9 G/DL (ref 31.5–35.7)
MCHC RBC AUTO-ENTMCNC: 34.1 G/DL (ref 31.5–35.7)
MCHC RBC AUTO-ENTMCNC: 34.2 G/DL (ref 31.5–35.7)
MCHC RBC AUTO-ENTMCNC: 34.3 G/DL (ref 31.5–35.7)
MCHC RBC AUTO-ENTMCNC: 34.4 G/DL (ref 31.5–35.7)
MCHC RBC AUTO-ENTMCNC: 35.1 G/DL (ref 31.5–35.7)
MCV RBC AUTO: 86.7 FL (ref 79–97)
MCV RBC AUTO: 87.3 FL (ref 79–97)
MCV RBC AUTO: 87.8 FL (ref 79–97)
MCV RBC AUTO: 87.8 FL (ref 79–97)
MCV RBC AUTO: 88.4 FL (ref 79–97)
MCV RBC AUTO: 88.4 FL (ref 79–97)
MCV RBC AUTO: 88.8 FL (ref 79–97)
MCV RBC AUTO: 88.8 FL (ref 79–97)
MCV RBC AUTO: 89 FL (ref 79–97)
MCV RBC AUTO: 89.1 FL (ref 79–97)
MCV RBC AUTO: 89.2 FL (ref 79–97)
MCV RBC AUTO: 89.3 FL (ref 79–97)
MCV RBC AUTO: 89.4 FL (ref 79–97)
MCV RBC AUTO: 92.2 FL (ref 79–97)
METHGB BLD QL: 0.1 % (ref 0–1.5)
METHGB BLD QL: 0.4 % (ref 0–1.5)
METHGB BLD QL: 0.4 % (ref 0–1.5)
MODALITY: ABNORMAL
MONOCYTES # BLD AUTO: 1.04 10*3/MM3 (ref 0.1–0.9)
MONOCYTES # BLD AUTO: 1.14 10*3/MM3 (ref 0.1–0.9)
MONOCYTES # BLD AUTO: 1.21 10*3/MM3 (ref 0.1–0.9)
MONOCYTES # BLD AUTO: 1.26 10*3/MM3 (ref 0.1–0.9)
MONOCYTES # BLD AUTO: 1.34 10*3/MM3 (ref 0.1–0.9)
MONOCYTES # BLD AUTO: 1.62 10*3/MM3 (ref 0.1–0.9)
MONOCYTES # BLD AUTO: 1.62 10*3/MM3 (ref 0.1–0.9)
MONOCYTES NFR BLD AUTO: 10.4 % (ref 5–12)
MONOCYTES NFR BLD AUTO: 11.2 % (ref 5–12)
MONOCYTES NFR BLD AUTO: 11.8 % (ref 5–12)
MONOCYTES NFR BLD AUTO: 12 % (ref 5–12)
MONOCYTES NFR BLD AUTO: 12.8 % (ref 5–12)
MONOCYTES NFR BLD AUTO: 14.6 % (ref 5–12)
MONOCYTES NFR BLD AUTO: 9 % (ref 5–12)
MRSA DNA SPEC QL NAA+PROBE: NORMAL
NEUTROPHILS NFR BLD AUTO: 10.54 10*3/MM3 (ref 1.7–7)
NEUTROPHILS NFR BLD AUTO: 14.53 10*3/MM3 (ref 1.7–7)
NEUTROPHILS NFR BLD AUTO: 4.68 10*3/MM3 (ref 1.7–7)
NEUTROPHILS NFR BLD AUTO: 5.83 10*3/MM3 (ref 1.7–7)
NEUTROPHILS NFR BLD AUTO: 54.2 % (ref 42.7–76)
NEUTROPHILS NFR BLD AUTO: 6.39 10*3/MM3 (ref 1.7–7)
NEUTROPHILS NFR BLD AUTO: 62.8 % (ref 42.7–76)
NEUTROPHILS NFR BLD AUTO: 66.2 % (ref 42.7–76)
NEUTROPHILS NFR BLD AUTO: 7.69 10*3/MM3 (ref 1.7–7)
NEUTROPHILS NFR BLD AUTO: 73.4 % (ref 42.7–76)
NEUTROPHILS NFR BLD AUTO: 77.2 % (ref 42.7–76)
NEUTROPHILS NFR BLD AUTO: 78.4 % (ref 42.7–76)
NEUTROPHILS NFR BLD AUTO: 8.97 10*3/MM3 (ref 1.7–7)
NEUTROPHILS NFR BLD AUTO: 80.7 % (ref 42.7–76)
NITRITE UR QL STRIP: NEGATIVE
NITRITE UR QL STRIP: NEGATIVE
NOTE: ABNORMAL
NOTIFIED BY: ABNORMAL
NOTIFIED WHO: ABNORMAL
NRBC BLD AUTO-RTO: 0 /100 WBC (ref 0–0.2)
NT-PROBNP SERPL-MCNC: 1469 PG/ML (ref 0–1800)
NT-PROBNP SERPL-MCNC: 194.2 PG/ML (ref 0–1800)
NT-PROBNP SERPL-MCNC: 888.2 PG/ML (ref 0–1800)
OXYHGB MFR BLDV: 89.7 % (ref 94–99)
OXYHGB MFR BLDV: 91.9 % (ref 94–99)
OXYHGB MFR BLDV: 93.7 % (ref 94–99)
PCO2 BLDA: 31.1 MM HG (ref 35–45)
PCO2 BLDA: 40.8 MM HG (ref 35–45)
PCO2 BLDA: 41 MM HG (ref 35–45)
PCO2 TEMP ADJ BLD: ABNORMAL MM[HG]
PH BLDA: 7.41 PH UNITS (ref 7.35–7.45)
PH BLDA: 7.42 PH UNITS (ref 7.35–7.45)
PH BLDA: 7.47 PH UNITS (ref 7.35–7.45)
PH UR STRIP.AUTO: 5.5 [PH] (ref 5–8)
PH UR STRIP.AUTO: <=5 [PH] (ref 5–8)
PH, TEMP CORRECTED: ABNORMAL
PLATELET # BLD AUTO: 167 10*3/MM3 (ref 140–450)
PLATELET # BLD AUTO: 212 10*3/MM3 (ref 140–450)
PLATELET # BLD AUTO: 227 10*3/MM3 (ref 140–450)
PLATELET # BLD AUTO: 249 10*3/MM3 (ref 140–450)
PLATELET # BLD AUTO: 255 10*3/MM3 (ref 140–450)
PLATELET # BLD AUTO: 259 10*3/MM3 (ref 140–450)
PLATELET # BLD AUTO: 270 10*3/MM3 (ref 140–450)
PLATELET # BLD AUTO: 274 10*3/MM3 (ref 140–450)
PLATELET # BLD AUTO: 285 10*3/MM3 (ref 140–450)
PLATELET # BLD AUTO: 304 10*3/MM3 (ref 140–450)
PLATELET # BLD AUTO: 326 10*3/MM3 (ref 140–450)
PLATELET # BLD AUTO: 346 10*3/MM3 (ref 140–450)
PLATELET # BLD AUTO: 379 10*3/MM3 (ref 140–450)
PLATELET # BLD AUTO: 390 10*3/MM3 (ref 140–450)
PMV BLD AUTO: 10 FL (ref 6–12)
PMV BLD AUTO: 10.1 FL (ref 6–12)
PMV BLD AUTO: 10.2 FL (ref 6–12)
PMV BLD AUTO: 10.2 FL (ref 6–12)
PMV BLD AUTO: 10.3 FL (ref 6–12)
PMV BLD AUTO: 11.1 FL (ref 6–12)
PMV BLD AUTO: 11.1 FL (ref 6–12)
PMV BLD AUTO: 11.3 FL (ref 6–12)
PMV BLD AUTO: 9.6 FL (ref 6–12)
PMV BLD AUTO: 9.9 FL (ref 6–12)
PO2 BLDA: 56 MM HG (ref 75–100)
PO2 BLDA: 66.9 MM HG (ref 75–100)
PO2 BLDA: 71.9 MM HG (ref 75–100)
PO2 TEMP ADJ BLD: ABNORMAL MM[HG]
POTASSIUM SERPL-SCNC: 4 MMOL/L (ref 3.5–5.2)
POTASSIUM SERPL-SCNC: 4.3 MMOL/L (ref 3.5–5.2)
POTASSIUM SERPL-SCNC: 4.3 MMOL/L (ref 3.5–5.2)
POTASSIUM SERPL-SCNC: 4.4 MMOL/L (ref 3.5–5.2)
POTASSIUM SERPL-SCNC: 4.5 MMOL/L (ref 3.5–5.2)
POTASSIUM SERPL-SCNC: 4.6 MMOL/L (ref 3.5–5.2)
POTASSIUM SERPL-SCNC: 4.8 MMOL/L (ref 3.5–5.2)
POTASSIUM SERPL-SCNC: 4.9 MMOL/L (ref 3.5–5.2)
POTASSIUM SERPL-SCNC: 5.2 MMOL/L (ref 3.5–5.2)
POTASSIUM SERPL-SCNC: 5.3 MMOL/L (ref 3.5–5.2)
PROCALCITONIN SERPL-MCNC: 0.1 NG/ML (ref 0–0.25)
PROCALCITONIN SERPL-MCNC: 0.1 NG/ML (ref 0–0.25)
PROCALCITONIN SERPL-MCNC: 0.14 NG/ML (ref 0–0.25)
PROCALCITONIN SERPL-MCNC: 0.15 NG/ML (ref 0–0.25)
PROCALCITONIN SERPL-MCNC: 0.19 NG/ML (ref 0–0.25)
PROCALCITONIN SERPL-MCNC: 0.61 NG/ML (ref 0–0.25)
PROT SERPL-MCNC: 5.3 G/DL (ref 6–8.5)
PROT SERPL-MCNC: 5.8 G/DL (ref 6–8.5)
PROT SERPL-MCNC: 6 G/DL (ref 6–8.5)
PROT SERPL-MCNC: 6 G/DL (ref 6–8.5)
PROT SERPL-MCNC: 6.1 G/DL (ref 6–8.5)
PROT SERPL-MCNC: 7 G/DL (ref 6–8.5)
PROT UR QL STRIP: ABNORMAL
PROT UR QL STRIP: ABNORMAL
RBC # BLD AUTO: 2.84 10*6/MM3 (ref 4.14–5.8)
RBC # BLD AUTO: 2.85 10*6/MM3 (ref 4.14–5.8)
RBC # BLD AUTO: 2.94 10*6/MM3 (ref 4.14–5.8)
RBC # BLD AUTO: 3.18 10*6/MM3 (ref 4.14–5.8)
RBC # BLD AUTO: 3.19 10*6/MM3 (ref 4.14–5.8)
RBC # BLD AUTO: 3.38 10*6/MM3 (ref 4.14–5.8)
RBC # BLD AUTO: 3.53 10*6/MM3 (ref 4.14–5.8)
RBC # BLD AUTO: 3.76 10*6/MM3 (ref 4.14–5.8)
RBC # BLD AUTO: 3.78 10*6/MM3 (ref 4.14–5.8)
RBC # BLD AUTO: 3.78 10*6/MM3 (ref 4.14–5.8)
RBC # BLD AUTO: 3.96 10*6/MM3 (ref 4.14–5.8)
RBC # BLD AUTO: 3.98 10*6/MM3 (ref 4.14–5.8)
RBC # BLD AUTO: 4.06 10*6/MM3 (ref 4.14–5.8)
RBC # BLD AUTO: 4.18 10*6/MM3 (ref 4.14–5.8)
RBC # UR STRIP: ABNORMAL /HPF
RBC # UR STRIP: ABNORMAL /HPF
REF LAB TEST METHOD: ABNORMAL
REF LAB TEST METHOD: ABNORMAL
SAO2 % BLDCOA: 91.2 % (ref 94–100)
SAO2 % BLDCOA: 93.7 % (ref 94–100)
SAO2 % BLDCOA: 95.2 % (ref 94–100)
SARS-COV-2 RNA PNL SPEC NAA+PROBE: DETECTED
SARS-COV-2 RNA PNL SPEC NAA+PROBE: NOT DETECTED
SODIUM SERPL-SCNC: 134 MMOL/L (ref 136–145)
SODIUM SERPL-SCNC: 135 MMOL/L (ref 136–145)
SODIUM SERPL-SCNC: 135 MMOL/L (ref 136–145)
SODIUM SERPL-SCNC: 136 MMOL/L (ref 136–145)
SODIUM SERPL-SCNC: 138 MMOL/L (ref 136–145)
SODIUM SERPL-SCNC: 139 MMOL/L (ref 136–145)
SODIUM SERPL-SCNC: 141 MMOL/L (ref 136–145)
SODIUM SERPL-SCNC: 141 MMOL/L (ref 136–145)
SODIUM SERPL-SCNC: 143 MMOL/L (ref 136–145)
SP GR UR STRIP: 1.01 (ref 1–1.03)
SP GR UR STRIP: 1.01 (ref 1–1.03)
SQUAMOUS #/AREA URNS HPF: ABNORMAL /HPF
SQUAMOUS #/AREA URNS HPF: ABNORMAL /HPF
STRESS TARGET HR: 111 BPM
TROPONIN T SERPL-MCNC: 0.02 NG/ML (ref 0–0.03)
TROPONIN T SERPL-MCNC: 0.03 NG/ML (ref 0–0.03)
TROPONIN T SERPL-MCNC: 0.03 NG/ML (ref 0–0.03)
TROPONIN T SERPL-MCNC: 0.07 NG/ML (ref 0–0.03)
TROPONIN T SERPL-MCNC: 0.09 NG/ML (ref 0–0.03)
TROPONIN T SERPL-MCNC: <0.01 NG/ML (ref 0–0.03)
UROBILINOGEN UR QL STRIP: ABNORMAL
UROBILINOGEN UR QL STRIP: ABNORMAL
VANCOMYCIN SERPL-MCNC: 18.2 MCG/ML (ref 5–40)
VENTILATOR MODE: ABNORMAL
WBC # UR STRIP: ABNORMAL /HPF
WBC # UR STRIP: ABNORMAL /HPF
WBC CASTS #/AREA URNS LPF: ABNORMAL /[LPF]
WBC NRBC COR # BLD: 10.47 10*3/MM3 (ref 3.4–10.8)
WBC NRBC COR # BLD: 11.13 10*3/MM3 (ref 3.4–10.8)
WBC NRBC COR # BLD: 11.21 10*3/MM3 (ref 3.4–10.8)
WBC NRBC COR # BLD: 11.62 10*3/MM3 (ref 3.4–10.8)
WBC NRBC COR # BLD: 12.33 10*3/MM3 (ref 3.4–10.8)
WBC NRBC COR # BLD: 12.89 10*3/MM3 (ref 3.4–10.8)
WBC NRBC COR # BLD: 13.12 10*3/MM3 (ref 3.4–10.8)
WBC NRBC COR # BLD: 13.46 10*3/MM3 (ref 3.4–10.8)
WBC NRBC COR # BLD: 16.63 10*3/MM3 (ref 3.4–10.8)
WBC NRBC COR # BLD: 18.02 10*3/MM3 (ref 3.4–10.8)
WBC NRBC COR # BLD: 8.34 10*3/MM3 (ref 3.4–10.8)
WBC NRBC COR # BLD: 8.63 10*3/MM3 (ref 3.4–10.8)
WBC NRBC COR # BLD: 9.28 10*3/MM3 (ref 3.4–10.8)
WBC NRBC COR # BLD: 9.65 10*3/MM3 (ref 3.4–10.8)
WHOLE BLOOD HOLD COAG: NORMAL
WHOLE BLOOD HOLD COAG: NORMAL
WHOLE BLOOD HOLD SPECIMEN: NORMAL
WHOLE BLOOD HOLD SPECIMEN: NORMAL

## 2022-01-01 PROCEDURE — 83880 ASSAY OF NATRIURETIC PEPTIDE: CPT | Performed by: FAMILY MEDICINE

## 2022-01-01 PROCEDURE — 87040 BLOOD CULTURE FOR BACTERIA: CPT | Performed by: INTERNAL MEDICINE

## 2022-01-01 PROCEDURE — 97535 SELF CARE MNGMENT TRAINING: CPT

## 2022-01-01 PROCEDURE — 81001 URINALYSIS AUTO W/SCOPE: CPT | Performed by: FAMILY MEDICINE

## 2022-01-01 PROCEDURE — 97530 THERAPEUTIC ACTIVITIES: CPT

## 2022-01-01 PROCEDURE — 71046 X-RAY EXAM CHEST 2 VIEWS: CPT

## 2022-01-01 PROCEDURE — 85027 COMPLETE CBC AUTOMATED: CPT | Performed by: INTERNAL MEDICINE

## 2022-01-01 PROCEDURE — 97110 THERAPEUTIC EXERCISES: CPT

## 2022-01-01 PROCEDURE — 82728 ASSAY OF FERRITIN: CPT | Performed by: INTERNAL MEDICINE

## 2022-01-01 PROCEDURE — 92610 EVALUATE SWALLOWING FUNCTION: CPT

## 2022-01-01 PROCEDURE — P9612 CATHETERIZE FOR URINE SPEC: HCPCS

## 2022-01-01 PROCEDURE — 87641 MR-STAPH DNA AMP PROBE: CPT | Performed by: INTERNAL MEDICINE

## 2022-01-01 PROCEDURE — 87635 SARS-COV-2 COVID-19 AMP PRB: CPT | Performed by: INTERNAL MEDICINE

## 2022-01-01 PROCEDURE — 25010000002 HEPARIN (PORCINE) PER 1000 UNITS: Performed by: INTERNAL MEDICINE

## 2022-01-01 PROCEDURE — 63710000001 INSULIN ASPART PER 5 UNITS: Performed by: INTERNAL MEDICINE

## 2022-01-01 PROCEDURE — 87186 SC STD MICRODIL/AGAR DIL: CPT | Performed by: INTERNAL MEDICINE

## 2022-01-01 PROCEDURE — 80053 COMPREHEN METABOLIC PANEL: CPT | Performed by: FAMILY MEDICINE

## 2022-01-01 PROCEDURE — 82962 GLUCOSE BLOOD TEST: CPT

## 2022-01-01 PROCEDURE — 87077 CULTURE AEROBIC IDENTIFY: CPT | Performed by: INTERNAL MEDICINE

## 2022-01-01 PROCEDURE — 25010000002 DEXAMETHASONE PER 1 MG: Performed by: INTERNAL MEDICINE

## 2022-01-01 PROCEDURE — 99233 SBSQ HOSP IP/OBS HIGH 50: CPT | Performed by: INTERNAL MEDICINE

## 2022-01-01 PROCEDURE — 25010000002 ENOXAPARIN PER 10 MG: Performed by: INTERNAL MEDICINE

## 2022-01-01 PROCEDURE — 25010000002 FUROSEMIDE PER 20 MG: Performed by: INTERNAL MEDICINE

## 2022-01-01 PROCEDURE — 99283 EMERGENCY DEPT VISIT LOW MDM: CPT

## 2022-01-01 PROCEDURE — 99239 HOSP IP/OBS DSCHRG MGMT >30: CPT | Performed by: FAMILY MEDICINE

## 2022-01-01 PROCEDURE — 94799 UNLISTED PULMONARY SVC/PX: CPT

## 2022-01-01 PROCEDURE — 99232 SBSQ HOSP IP/OBS MODERATE 35: CPT | Performed by: INTERNAL MEDICINE

## 2022-01-01 PROCEDURE — 25010000002 DEXAMETHASONE PER 1 MG

## 2022-01-01 PROCEDURE — 63710000001 INSULIN DETEMIR PER 5 UNITS: Performed by: FAMILY MEDICINE

## 2022-01-01 PROCEDURE — 94762 N-INVAS EAR/PLS OXIMTRY CONT: CPT

## 2022-01-01 PROCEDURE — 85025 COMPLETE CBC W/AUTO DIFF WBC: CPT | Performed by: PHYSICIAN ASSISTANT

## 2022-01-01 PROCEDURE — 80048 BASIC METABOLIC PNL TOTAL CA: CPT | Performed by: INTERNAL MEDICINE

## 2022-01-01 PROCEDURE — 99316 NF DSCHRG MGMT 30 MIN+: CPT | Performed by: NURSE PRACTITIONER

## 2022-01-01 PROCEDURE — 36600 WITHDRAWAL OF ARTERIAL BLOOD: CPT

## 2022-01-01 PROCEDURE — 99233 SBSQ HOSP IP/OBS HIGH 50: CPT | Performed by: FAMILY MEDICINE

## 2022-01-01 PROCEDURE — 86140 C-REACTIVE PROTEIN: CPT | Performed by: INTERNAL MEDICINE

## 2022-01-01 PROCEDURE — 80053 COMPREHEN METABOLIC PANEL: CPT | Performed by: INTERNAL MEDICINE

## 2022-01-01 PROCEDURE — 25010000002 VANCOMYCIN 1 G RECONSTITUTED SOLUTION: Performed by: INTERNAL MEDICINE

## 2022-01-01 PROCEDURE — 97162 PT EVAL MOD COMPLEX 30 MIN: CPT

## 2022-01-01 PROCEDURE — 94664 DEMO&/EVAL PT USE INHALER: CPT

## 2022-01-01 PROCEDURE — 25010000002 AMPICILLIN PER 500 MG: Performed by: INTERNAL MEDICINE

## 2022-01-01 PROCEDURE — 81001 URINALYSIS AUTO W/SCOPE: CPT | Performed by: EMERGENCY MEDICINE

## 2022-01-01 PROCEDURE — 63710000001 INSULIN DETEMIR PER 5 UNITS: Performed by: INTERNAL MEDICINE

## 2022-01-01 PROCEDURE — 82805 BLOOD GASES W/O2 SATURATION: CPT

## 2022-01-01 PROCEDURE — 83605 ASSAY OF LACTIC ACID: CPT | Performed by: FAMILY MEDICINE

## 2022-01-01 PROCEDURE — 84145 PROCALCITONIN (PCT): CPT | Performed by: INTERNAL MEDICINE

## 2022-01-01 PROCEDURE — 25010000002 HYDRALAZINE PER 20 MG: Performed by: INTERNAL MEDICINE

## 2022-01-01 PROCEDURE — 85025 COMPLETE CBC W/AUTO DIFF WBC: CPT

## 2022-01-01 PROCEDURE — 71045 X-RAY EXAM CHEST 1 VIEW: CPT

## 2022-01-01 PROCEDURE — 85025 COMPLETE CBC W/AUTO DIFF WBC: CPT | Performed by: FAMILY MEDICINE

## 2022-01-01 PROCEDURE — 82375 ASSAY CARBOXYHB QUANT: CPT

## 2022-01-01 PROCEDURE — 83050 HGB METHEMOGLOBIN QUAN: CPT

## 2022-01-01 PROCEDURE — 83036 HEMOGLOBIN GLYCOSYLATED A1C: CPT | Performed by: INTERNAL MEDICINE

## 2022-01-01 PROCEDURE — 93005 ELECTROCARDIOGRAM TRACING: CPT

## 2022-01-01 PROCEDURE — 25010000002 CEFTRIAXONE SODIUM-DEXTROSE 1-3.74 GM-%(50ML) RECONSTITUTED SOLUTION: Performed by: INTERNAL MEDICINE

## 2022-01-01 PROCEDURE — 25010000002 IOPAMIDOL 61 % SOLUTION: Performed by: FAMILY MEDICINE

## 2022-01-01 PROCEDURE — 99309 SBSQ NF CARE MODERATE MDM 30: CPT | Performed by: NURSE PRACTITIONER

## 2022-01-01 PROCEDURE — 97116 GAIT TRAINING THERAPY: CPT

## 2022-01-01 PROCEDURE — 25010000002 VANCOMYCIN 5 G RECONSTITUTED SOLUTION: Performed by: INTERNAL MEDICINE

## 2022-01-01 PROCEDURE — 84484 ASSAY OF TROPONIN QUANT: CPT | Performed by: EMERGENCY MEDICINE

## 2022-01-01 PROCEDURE — 83880 ASSAY OF NATRIURETIC PEPTIDE: CPT

## 2022-01-01 PROCEDURE — 99306 1ST NF CARE HIGH MDM 50: CPT | Performed by: INTERNAL MEDICINE

## 2022-01-01 PROCEDURE — 36415 COLL VENOUS BLD VENIPUNCTURE: CPT

## 2022-01-01 PROCEDURE — 99232 SBSQ HOSP IP/OBS MODERATE 35: CPT | Performed by: FAMILY MEDICINE

## 2022-01-01 PROCEDURE — 25010000002 FUROSEMIDE PER 20 MG: Performed by: FAMILY MEDICINE

## 2022-01-01 PROCEDURE — 94761 N-INVAS EAR/PLS OXIMETRY MLT: CPT

## 2022-01-01 PROCEDURE — 85379 FIBRIN DEGRADATION QUANT: CPT | Performed by: INTERNAL MEDICINE

## 2022-01-01 PROCEDURE — 84484 ASSAY OF TROPONIN QUANT: CPT | Performed by: INTERNAL MEDICINE

## 2022-01-01 PROCEDURE — 97165 OT EVAL LOW COMPLEX 30 MIN: CPT

## 2022-01-01 PROCEDURE — 25010000002 METHYLPREDNISOLONE PER 125 MG: Performed by: FAMILY MEDICINE

## 2022-01-01 PROCEDURE — 25010000002 VANCOMYCIN 5 G RECONSTITUTED SOLUTION: Performed by: FAMILY MEDICINE

## 2022-01-01 PROCEDURE — 83615 LACTATE (LD) (LDH) ENZYME: CPT | Performed by: INTERNAL MEDICINE

## 2022-01-01 PROCEDURE — 99223 1ST HOSP IP/OBS HIGH 75: CPT | Performed by: INTERNAL MEDICINE

## 2022-01-01 PROCEDURE — 25010000002 DEXAMETHASONE SODIUM PHOSPHATE 20 MG/5ML SOLUTION: Performed by: INTERNAL MEDICINE

## 2022-01-01 PROCEDURE — 25010000002 CEFEPIME-DEXTROSE 2-5 GM-%(50ML) RECONSTITUTED SOLUTION: Performed by: INTERNAL MEDICINE

## 2022-01-01 PROCEDURE — 80053 COMPREHEN METABOLIC PANEL: CPT | Performed by: PHYSICIAN ASSISTANT

## 2022-01-01 PROCEDURE — 93005 ELECTROCARDIOGRAM TRACING: CPT | Performed by: FAMILY MEDICINE

## 2022-01-01 PROCEDURE — 80202 ASSAY OF VANCOMYCIN: CPT | Performed by: INTERNAL MEDICINE

## 2022-01-01 PROCEDURE — 80048 BASIC METABOLIC PNL TOTAL CA: CPT | Performed by: FAMILY MEDICINE

## 2022-01-01 PROCEDURE — 70450 CT HEAD/BRAIN W/O DYE: CPT

## 2022-01-01 PROCEDURE — 87077 CULTURE AEROBIC IDENTIFY: CPT | Performed by: FAMILY MEDICINE

## 2022-01-01 PROCEDURE — 84145 PROCALCITONIN (PCT): CPT | Performed by: FAMILY MEDICINE

## 2022-01-01 PROCEDURE — 87186 SC STD MICRODIL/AGAR DIL: CPT | Performed by: FAMILY MEDICINE

## 2022-01-01 PROCEDURE — 93005 ELECTROCARDIOGRAM TRACING: CPT | Performed by: PHYSICIAN ASSISTANT

## 2022-01-01 PROCEDURE — 86140 C-REACTIVE PROTEIN: CPT | Performed by: FAMILY MEDICINE

## 2022-01-01 PROCEDURE — 94760 N-INVAS EAR/PLS OXIMETRY 1: CPT

## 2022-01-01 PROCEDURE — 25010000002 CEFEPIME-DEXTROSE 2-5 GM-%(50ML) RECONSTITUTED SOLUTION: Performed by: FAMILY MEDICINE

## 2022-01-01 PROCEDURE — 93306 TTE W/DOPPLER COMPLETE: CPT | Performed by: INTERNAL MEDICINE

## 2022-01-01 PROCEDURE — 84484 ASSAY OF TROPONIN QUANT: CPT | Performed by: FAMILY MEDICINE

## 2022-01-01 PROCEDURE — 84484 ASSAY OF TROPONIN QUANT: CPT

## 2022-01-01 PROCEDURE — 97161 PT EVAL LOW COMPLEX 20 MIN: CPT

## 2022-01-01 PROCEDURE — 71275 CT ANGIOGRAPHY CHEST: CPT

## 2022-01-01 PROCEDURE — 99284 EMERGENCY DEPT VISIT MOD MDM: CPT

## 2022-01-01 PROCEDURE — 25010000002 AZITHROMYCIN PER 500 MG: Performed by: INTERNAL MEDICINE

## 2022-01-01 PROCEDURE — 85025 COMPLETE CBC W/AUTO DIFF WBC: CPT | Performed by: INTERNAL MEDICINE

## 2022-01-01 PROCEDURE — 83690 ASSAY OF LIPASE: CPT | Performed by: FAMILY MEDICINE

## 2022-01-01 PROCEDURE — 99285 EMERGENCY DEPT VISIT HI MDM: CPT

## 2022-01-01 PROCEDURE — 87635 SARS-COV-2 COVID-19 AMP PRB: CPT | Performed by: FAMILY MEDICINE

## 2022-01-01 PROCEDURE — 93306 TTE W/DOPPLER COMPLETE: CPT

## 2022-01-01 PROCEDURE — 87040 BLOOD CULTURE FOR BACTERIA: CPT | Performed by: FAMILY MEDICINE

## 2022-01-01 PROCEDURE — 80053 COMPREHEN METABOLIC PANEL: CPT

## 2022-01-01 PROCEDURE — 84484 ASSAY OF TROPONIN QUANT: CPT | Performed by: PHYSICIAN ASSISTANT

## 2022-01-01 PROCEDURE — 83880 ASSAY OF NATRIURETIC PEPTIDE: CPT | Performed by: PHYSICIAN ASSISTANT

## 2022-01-01 PROCEDURE — 97164 PT RE-EVAL EST PLAN CARE: CPT

## 2022-01-01 PROCEDURE — 87150 DNA/RNA AMPLIFIED PROBE: CPT | Performed by: FAMILY MEDICINE

## 2022-01-01 PROCEDURE — 25010000002 IOPAMIDOL 61 % SOLUTION: Performed by: EMERGENCY MEDICINE

## 2022-01-01 RX ORDER — INSULIN ASPART 100 [IU]/ML
INJECTION, SOLUTION INTRAVENOUS; SUBCUTANEOUS
COMMUNITY
End: 2022-08-07

## 2022-01-01 RX ORDER — DEXAMETHASONE 1 MG
1 TABLET ORAL
COMMUNITY
End: 2022-01-01 | Stop reason: HOSPADM

## 2022-01-01 RX ORDER — TAMSULOSIN HYDROCHLORIDE 0.4 MG/1
0.4 CAPSULE ORAL NIGHTLY
Status: DISCONTINUED | OUTPATIENT
Start: 2022-01-01 | End: 2022-08-07 | Stop reason: HOSPADM

## 2022-01-01 RX ORDER — DEXTROSE MONOHYDRATE 25 G/50ML
25 INJECTION, SOLUTION INTRAVENOUS
Status: DISCONTINUED | OUTPATIENT
Start: 2022-01-01 | End: 2022-08-07 | Stop reason: HOSPADM

## 2022-01-01 RX ORDER — SODIUM CHLORIDE 0.9 % (FLUSH) 0.9 %
3 SYRINGE (ML) INJECTION EVERY 12 HOURS SCHEDULED
Status: DISCONTINUED | OUTPATIENT
Start: 2022-01-01 | End: 2022-08-07 | Stop reason: HOSPADM

## 2022-01-01 RX ORDER — BUDESONIDE 0.5 MG/2ML
0.5 INHALANT ORAL
Status: DISCONTINUED | OUTPATIENT
Start: 2022-01-01 | End: 2022-08-07 | Stop reason: HOSPADM

## 2022-01-01 RX ORDER — IPRATROPIUM BROMIDE AND ALBUTEROL SULFATE 2.5; .5 MG/3ML; MG/3ML
3 SOLUTION RESPIRATORY (INHALATION) EVERY 4 HOURS PRN
Status: DISCONTINUED | OUTPATIENT
Start: 2022-01-01 | End: 2022-08-07 | Stop reason: HOSPADM

## 2022-01-01 RX ORDER — CARVEDILOL 6.25 MG/1
6.25 TABLET ORAL 2 TIMES DAILY WITH MEALS
Status: DISCONTINUED | OUTPATIENT
Start: 2022-01-01 | End: 2022-01-01 | Stop reason: SDUPTHER

## 2022-01-01 RX ORDER — ONDANSETRON 2 MG/ML
4 INJECTION INTRAMUSCULAR; INTRAVENOUS EVERY 6 HOURS PRN
Status: DISCONTINUED | OUTPATIENT
Start: 2022-01-01 | End: 2022-01-01 | Stop reason: HOSPADM

## 2022-01-01 RX ORDER — CEFEPIME HYDROCHLORIDE 2 G/50ML
2 INJECTION, SOLUTION INTRAVENOUS EVERY 12 HOURS
Status: DISCONTINUED | OUTPATIENT
Start: 2022-01-01 | End: 2022-01-01

## 2022-01-01 RX ORDER — ALBUTEROL SULFATE 90 UG/1
2 AEROSOL, METERED RESPIRATORY (INHALATION) EVERY 4 HOURS PRN
Qty: 30 G | Refills: 0
Start: 2022-01-01 | End: 2022-08-07

## 2022-01-01 RX ORDER — AMLODIPINE BESYLATE 5 MG/1
5 TABLET ORAL
Status: DISCONTINUED | OUTPATIENT
Start: 2022-01-01 | End: 2022-01-01

## 2022-01-01 RX ORDER — FUROSEMIDE 10 MG/ML
40 INJECTION INTRAMUSCULAR; INTRAVENOUS ONCE
Status: COMPLETED | OUTPATIENT
Start: 2022-01-01 | End: 2022-01-01

## 2022-01-01 RX ORDER — CARVEDILOL 6.25 MG/1
12.5 TABLET ORAL 2 TIMES DAILY WITH MEALS
Start: 2022-01-01 | End: 2022-01-01 | Stop reason: SDUPTHER

## 2022-01-01 RX ORDER — BISACODYL 10 MG
10 SUPPOSITORY, RECTAL RECTAL DAILY PRN
Status: DISCONTINUED | OUTPATIENT
Start: 2022-01-01 | End: 2022-08-07 | Stop reason: HOSPADM

## 2022-01-01 RX ORDER — LOSARTAN POTASSIUM 50 MG/1
50 TABLET ORAL
Status: DISCONTINUED | OUTPATIENT
Start: 2022-01-01 | End: 2022-01-01 | Stop reason: HOSPADM

## 2022-01-01 RX ORDER — SODIUM CHLORIDE 0.9 % (FLUSH) 0.9 %
10 SYRINGE (ML) INJECTION EVERY 12 HOURS SCHEDULED
Status: DISCONTINUED | OUTPATIENT
Start: 2022-01-01 | End: 2022-01-01 | Stop reason: HOSPADM

## 2022-01-01 RX ORDER — POLYETHYLENE GLYCOL 3350 17 G/17G
17 POWDER, FOR SOLUTION ORAL DAILY
Status: DISCONTINUED | OUTPATIENT
Start: 2022-01-01 | End: 2022-01-01 | Stop reason: HOSPADM

## 2022-01-01 RX ORDER — NICOTINE POLACRILEX 4 MG
15 LOZENGE BUCCAL
Status: DISCONTINUED | OUTPATIENT
Start: 2022-01-01 | End: 2022-08-07 | Stop reason: HOSPADM

## 2022-01-01 RX ORDER — MULTIPLE VITAMINS W/ MINERALS TAB 9MG-400MCG
1 TAB ORAL DAILY
Status: DISCONTINUED | OUTPATIENT
Start: 2022-01-01 | End: 2022-01-01 | Stop reason: HOSPADM

## 2022-01-01 RX ORDER — CEFTRIAXONE 1 G/50ML
1 INJECTION, SOLUTION INTRAVENOUS EVERY 24 HOURS
Status: DISCONTINUED | OUTPATIENT
Start: 2022-01-01 | End: 2022-01-01

## 2022-01-01 RX ORDER — CARVEDILOL 12.5 MG/1
12.5 TABLET ORAL 2 TIMES DAILY WITH MEALS
Status: DISCONTINUED | OUTPATIENT
Start: 2022-01-01 | End: 2022-08-07 | Stop reason: HOSPADM

## 2022-01-01 RX ORDER — ONDANSETRON 4 MG/1
4 TABLET, FILM COATED ORAL EVERY 6 HOURS PRN
Status: DISCONTINUED | OUTPATIENT
Start: 2022-01-01 | End: 2022-01-01 | Stop reason: HOSPADM

## 2022-01-01 RX ORDER — NICOTINE POLACRILEX 4 MG
1 LOZENGE BUCCAL
Status: DISCONTINUED | OUTPATIENT
Start: 2022-01-01 | End: 2022-01-01 | Stop reason: HOSPADM

## 2022-01-01 RX ORDER — ACETAMINOPHEN 325 MG/1
650 TABLET ORAL EVERY 4 HOURS PRN
Status: DISCONTINUED | OUTPATIENT
Start: 2022-01-01 | End: 2022-01-01 | Stop reason: HOSPADM

## 2022-01-01 RX ORDER — IPRATROPIUM BROMIDE AND ALBUTEROL SULFATE 2.5; .5 MG/3ML; MG/3ML
3 SOLUTION RESPIRATORY (INHALATION)
Status: DISCONTINUED | OUTPATIENT
Start: 2022-01-01 | End: 2022-08-07 | Stop reason: HOSPADM

## 2022-01-01 RX ORDER — AMLODIPINE BESYLATE 5 MG/1
10 TABLET ORAL DAILY
Status: DISCONTINUED | OUTPATIENT
Start: 2022-01-01 | End: 2022-08-07 | Stop reason: HOSPADM

## 2022-01-01 RX ORDER — BUSPIRONE HYDROCHLORIDE 10 MG/1
10 TABLET ORAL EVERY 8 HOURS SCHEDULED
Status: DISCONTINUED | OUTPATIENT
Start: 2022-01-01 | End: 2022-08-07 | Stop reason: HOSPADM

## 2022-01-01 RX ORDER — AMLODIPINE BESYLATE 5 MG/1
10 TABLET ORAL DAILY
Status: DISCONTINUED | OUTPATIENT
Start: 2022-01-01 | End: 2022-01-01 | Stop reason: HOSPADM

## 2022-01-01 RX ORDER — ASPIRIN 81 MG/1
81 TABLET ORAL DAILY
Status: DISCONTINUED | OUTPATIENT
Start: 2022-01-01 | End: 2022-08-07 | Stop reason: HOSPADM

## 2022-01-01 RX ORDER — SODIUM CHLORIDE 0.9 % (FLUSH) 0.9 %
10 SYRINGE (ML) INJECTION AS NEEDED
Status: DISCONTINUED | OUTPATIENT
Start: 2022-01-01 | End: 2022-01-01 | Stop reason: HOSPADM

## 2022-01-01 RX ORDER — ALBUTEROL SULFATE 90 UG/1
2 AEROSOL, METERED RESPIRATORY (INHALATION)
Status: DISCONTINUED | OUTPATIENT
Start: 2022-01-01 | End: 2022-01-01 | Stop reason: HOSPADM

## 2022-01-01 RX ORDER — ACETAMINOPHEN 325 MG/1
650 TABLET ORAL EVERY 4 HOURS PRN
Status: DISCONTINUED | OUTPATIENT
Start: 2022-01-01 | End: 2022-08-07 | Stop reason: HOSPADM

## 2022-01-01 RX ORDER — BISACODYL 5 MG/1
5 TABLET, DELAYED RELEASE ORAL DAILY PRN
Status: DISCONTINUED | OUTPATIENT
Start: 2022-01-01 | End: 2022-08-07 | Stop reason: HOSPADM

## 2022-01-01 RX ORDER — CARVEDILOL 12.5 MG/1
12.5 TABLET ORAL 2 TIMES DAILY WITH MEALS
Status: DISCONTINUED | OUTPATIENT
Start: 2022-01-01 | End: 2022-01-01 | Stop reason: HOSPADM

## 2022-01-01 RX ORDER — AMLODIPINE BESYLATE 5 MG/1
5 TABLET ORAL ONCE
Status: COMPLETED | OUTPATIENT
Start: 2022-01-01 | End: 2022-01-01

## 2022-01-01 RX ORDER — SODIUM CHLORIDE 0.9 % (FLUSH) 0.9 %
3-10 SYRINGE (ML) INJECTION AS NEEDED
Status: DISCONTINUED | OUTPATIENT
Start: 2022-01-01 | End: 2022-08-07 | Stop reason: HOSPADM

## 2022-01-01 RX ORDER — GUAIFENESIN 600 MG/1
600 TABLET, EXTENDED RELEASE ORAL EVERY 12 HOURS SCHEDULED
Status: DISCONTINUED | OUTPATIENT
Start: 2022-01-01 | End: 2022-08-07 | Stop reason: HOSPADM

## 2022-01-01 RX ORDER — CARVEDILOL 6.25 MG/1
6.25 TABLET ORAL 2 TIMES DAILY WITH MEALS
Status: ON HOLD | COMMUNITY
End: 2022-01-01 | Stop reason: SDUPTHER

## 2022-01-01 RX ORDER — METHYLPREDNISOLONE SODIUM SUCCINATE 125 MG/2ML
125 INJECTION, POWDER, LYOPHILIZED, FOR SOLUTION INTRAMUSCULAR; INTRAVENOUS ONCE
Status: COMPLETED | OUTPATIENT
Start: 2022-01-01 | End: 2022-01-01

## 2022-01-01 RX ORDER — CHOLECALCIFEROL (VITAMIN D3) 125 MCG
5 CAPSULE ORAL NIGHTLY PRN
Status: DISCONTINUED | OUTPATIENT
Start: 2022-01-01 | End: 2022-01-01 | Stop reason: HOSPADM

## 2022-01-01 RX ORDER — AMLODIPINE BESYLATE 10 MG/1
5 TABLET ORAL DAILY
COMMUNITY
End: 2022-08-07

## 2022-01-01 RX ORDER — ALPRAZOLAM 0.5 MG/1
0.5 TABLET ORAL EVERY 12 HOURS PRN
Status: COMPLETED | OUTPATIENT
Start: 2022-01-01 | End: 2022-01-01

## 2022-01-01 RX ORDER — FUROSEMIDE 10 MG/ML
20 INJECTION INTRAMUSCULAR; INTRAVENOUS ONCE
Status: COMPLETED | OUTPATIENT
Start: 2022-01-01 | End: 2022-01-01

## 2022-01-01 RX ORDER — CEFEPIME HYDROCHLORIDE 2 G/50ML
2 INJECTION, SOLUTION INTRAVENOUS ONCE
Status: COMPLETED | OUTPATIENT
Start: 2022-01-01 | End: 2022-01-01

## 2022-01-01 RX ORDER — CARVEDILOL 6.25 MG/1
6.25 TABLET ORAL 2 TIMES DAILY WITH MEALS
Status: DISCONTINUED | OUTPATIENT
Start: 2022-01-01 | End: 2022-01-01

## 2022-01-01 RX ORDER — L.ACID,PARA/B.BIFIDUM/S.THERM 8B CELL
1 CAPSULE ORAL 2 TIMES DAILY
Status: DISCONTINUED | OUTPATIENT
Start: 2022-01-01 | End: 2022-08-07 | Stop reason: HOSPADM

## 2022-01-01 RX ORDER — SODIUM CHLORIDE 9 MG/ML
100 INJECTION, SOLUTION INTRAVENOUS CONTINUOUS
Status: DISCONTINUED | OUTPATIENT
Start: 2022-01-01 | End: 2022-01-01

## 2022-01-01 RX ORDER — HEPARIN SODIUM 5000 [USP'U]/ML
5000 INJECTION, SOLUTION INTRAVENOUS; SUBCUTANEOUS EVERY 12 HOURS SCHEDULED
Status: DISCONTINUED | OUTPATIENT
Start: 2022-01-01 | End: 2022-01-01 | Stop reason: HOSPADM

## 2022-01-01 RX ORDER — SODIUM CHLORIDE 0.9 % (FLUSH) 0.9 %
10 SYRINGE (ML) INJECTION AS NEEDED
Status: DISCONTINUED | OUTPATIENT
Start: 2022-01-01 | End: 2022-01-01 | Stop reason: SDUPTHER

## 2022-01-01 RX ORDER — AMLODIPINE BESYLATE 5 MG/1
10 TABLET ORAL DAILY
Status: DISCONTINUED | OUTPATIENT
Start: 2022-01-01 | End: 2022-01-01 | Stop reason: DRUGHIGH

## 2022-01-01 RX ORDER — INSULIN ASPART 100 [IU]/ML
20 INJECTION, SOLUTION INTRAVENOUS; SUBCUTANEOUS ONCE
Status: COMPLETED | OUTPATIENT
Start: 2022-01-01 | End: 2022-01-01

## 2022-01-01 RX ORDER — FUROSEMIDE 10 MG/ML
80 INJECTION INTRAMUSCULAR; INTRAVENOUS ONCE
Status: COMPLETED | OUTPATIENT
Start: 2022-01-01 | End: 2022-01-01

## 2022-01-01 RX ORDER — CARVEDILOL 6.25 MG/1
12.5 TABLET ORAL 2 TIMES DAILY WITH MEALS
Start: 2022-01-01 | End: 2022-08-07

## 2022-01-01 RX ORDER — ACETAMINOPHEN 160 MG/5ML
650 SOLUTION ORAL EVERY 4 HOURS PRN
Status: DISCONTINUED | OUTPATIENT
Start: 2022-01-01 | End: 2022-08-07 | Stop reason: HOSPADM

## 2022-01-01 RX ORDER — HYDRALAZINE HYDROCHLORIDE 20 MG/ML
10 INJECTION INTRAMUSCULAR; INTRAVENOUS EVERY 6 HOURS PRN
Status: DISCONTINUED | OUTPATIENT
Start: 2022-01-01 | End: 2022-01-01 | Stop reason: HOSPADM

## 2022-01-01 RX ORDER — ONDANSETRON 2 MG/ML
4 INJECTION INTRAMUSCULAR; INTRAVENOUS EVERY 6 HOURS PRN
Status: DISCONTINUED | OUTPATIENT
Start: 2022-01-01 | End: 2022-08-07 | Stop reason: HOSPADM

## 2022-01-01 RX ORDER — DEXTROSE MONOHYDRATE 25 G/50ML
25 INJECTION, SOLUTION INTRAVENOUS
Status: DISCONTINUED | OUTPATIENT
Start: 2022-01-01 | End: 2022-01-01 | Stop reason: HOSPADM

## 2022-01-01 RX ORDER — LOSARTAN POTASSIUM 50 MG/1
100 TABLET ORAL DAILY
Status: DISCONTINUED | OUTPATIENT
Start: 2022-01-01 | End: 2022-01-01

## 2022-01-01 RX ORDER — MULTIPLE VITAMINS W/ MINERALS TAB 9MG-400MCG
1 TAB ORAL DAILY
Status: DISCONTINUED | OUTPATIENT
Start: 2022-01-01 | End: 2022-08-07 | Stop reason: HOSPADM

## 2022-01-01 RX ORDER — AMOXICILLIN 250 MG
2 CAPSULE ORAL 2 TIMES DAILY
Status: DISCONTINUED | OUTPATIENT
Start: 2022-01-01 | End: 2022-08-07 | Stop reason: HOSPADM

## 2022-01-01 RX ORDER — LOSARTAN POTASSIUM 100 MG/1
100 TABLET ORAL DAILY
COMMUNITY
End: 2022-01-01 | Stop reason: HOSPADM

## 2022-01-01 RX ORDER — INSULIN GLARGINE 100 [IU]/ML
INJECTION, SOLUTION SUBCUTANEOUS 2 TIMES DAILY
COMMUNITY
End: 2022-08-07

## 2022-01-01 RX ORDER — INSULIN ASPART 100 [IU]/ML
0-7 INJECTION, SOLUTION INTRAVENOUS; SUBCUTANEOUS
Status: DISCONTINUED | OUTPATIENT
Start: 2022-01-01 | End: 2022-01-01 | Stop reason: HOSPADM

## 2022-01-01 RX ORDER — IPRATROPIUM BROMIDE AND ALBUTEROL SULFATE 2.5; .5 MG/3ML; MG/3ML
3 SOLUTION RESPIRATORY (INHALATION) EVERY 4 HOURS PRN
Status: DISCONTINUED | OUTPATIENT
Start: 2022-01-01 | End: 2022-01-01 | Stop reason: ALTCHOICE

## 2022-01-01 RX ORDER — CEFDINIR 300 MG/1
300 CAPSULE ORAL 2 TIMES DAILY
COMMUNITY
End: 2022-01-01 | Stop reason: HOSPADM

## 2022-01-01 RX ORDER — INSULIN ASPART 100 [IU]/ML
0-14 INJECTION, SOLUTION INTRAVENOUS; SUBCUTANEOUS
Status: DISCONTINUED | OUTPATIENT
Start: 2022-01-01 | End: 2022-08-07 | Stop reason: HOSPADM

## 2022-01-01 RX ORDER — TAMSULOSIN HYDROCHLORIDE 0.4 MG/1
0.4 CAPSULE ORAL NIGHTLY
Status: DISCONTINUED | OUTPATIENT
Start: 2022-01-01 | End: 2022-01-01 | Stop reason: HOSPADM

## 2022-01-01 RX ORDER — BUSPIRONE HYDROCHLORIDE 15 MG/1
7.5 TABLET ORAL EVERY 12 HOURS SCHEDULED
Status: DISCONTINUED | OUTPATIENT
Start: 2022-01-01 | End: 2022-01-01

## 2022-01-01 RX ORDER — SODIUM CHLORIDE 0.9 % (FLUSH) 0.9 %
10 SYRINGE (ML) INJECTION AS NEEDED
Status: DISCONTINUED | OUTPATIENT
Start: 2022-01-01 | End: 2022-08-07 | Stop reason: HOSPADM

## 2022-01-01 RX ORDER — LOSARTAN POTASSIUM 50 MG/1
50 TABLET ORAL
Qty: 30 TABLET | Refills: 0 | Status: SHIPPED | OUTPATIENT
Start: 2022-01-01 | End: 2022-08-07

## 2022-01-01 RX ORDER — TAMSULOSIN HYDROCHLORIDE 0.4 MG/1
1 CAPSULE ORAL DAILY
COMMUNITY
End: 2022-08-07

## 2022-01-01 RX ORDER — POLYETHYLENE GLYCOL 3350 17 G/17G
17 POWDER, FOR SOLUTION ORAL DAILY PRN
Status: DISCONTINUED | OUTPATIENT
Start: 2022-01-01 | End: 2022-08-07 | Stop reason: HOSPADM

## 2022-01-01 RX ORDER — ACETAMINOPHEN 650 MG/1
650 SUPPOSITORY RECTAL EVERY 4 HOURS PRN
Status: DISCONTINUED | OUTPATIENT
Start: 2022-01-01 | End: 2022-08-07 | Stop reason: HOSPADM

## 2022-01-01 RX ORDER — INSULIN ASPART 100 [IU]/ML
30 INJECTION, SOLUTION INTRAVENOUS; SUBCUTANEOUS ONCE
Status: COMPLETED | OUTPATIENT
Start: 2022-01-01 | End: 2022-01-01

## 2022-01-01 RX ORDER — ENOXAPARIN SODIUM 100 MG/ML
40 INJECTION SUBCUTANEOUS DAILY
Status: DISCONTINUED | OUTPATIENT
Start: 2022-01-01 | End: 2022-08-07 | Stop reason: HOSPADM

## 2022-01-01 RX ORDER — ASCORBIC ACID 500 MG
500 TABLET ORAL DAILY
Status: DISCONTINUED | OUTPATIENT
Start: 2022-01-01 | End: 2022-01-01 | Stop reason: HOSPADM

## 2022-01-01 RX ORDER — BUDESONIDE AND FORMOTEROL FUMARATE DIHYDRATE 160; 4.5 UG/1; UG/1
2 AEROSOL RESPIRATORY (INHALATION)
Status: DISCONTINUED | OUTPATIENT
Start: 2022-01-01 | End: 2022-01-01 | Stop reason: HOSPADM

## 2022-01-01 RX ORDER — AMOXICILLIN 250 MG
1 CAPSULE ORAL 2 TIMES DAILY
Status: DISCONTINUED | OUTPATIENT
Start: 2022-01-01 | End: 2022-01-01 | Stop reason: HOSPADM

## 2022-01-01 RX ADMIN — TAMSULOSIN HYDROCHLORIDE 0.4 MG: 0.4 CAPSULE ORAL at 21:12

## 2022-01-01 RX ADMIN — BUDESONIDE 0.5 MG: 0.5 SUSPENSION RESPIRATORY (INHALATION) at 20:41

## 2022-01-01 RX ADMIN — Medication 1 CAPSULE: at 09:24

## 2022-01-01 RX ADMIN — BUDESONIDE AND FORMOTEROL FUMARATE DIHYDRATE 2 PUFF: 160; 4.5 AEROSOL RESPIRATORY (INHALATION) at 10:19

## 2022-01-01 RX ADMIN — Medication 10 ML: at 20:42

## 2022-01-01 RX ADMIN — ALBUTEROL SULFATE 2 PUFF: 90 AEROSOL, METERED RESPIRATORY (INHALATION) at 19:50

## 2022-01-01 RX ADMIN — INSULIN DETEMIR 20 UNITS: 100 INJECTION, SOLUTION SUBCUTANEOUS at 20:40

## 2022-01-01 RX ADMIN — INSULIN DETEMIR 20 UNITS: 100 INJECTION, SOLUTION SUBCUTANEOUS at 22:25

## 2022-01-01 RX ADMIN — CARVEDILOL 12.5 MG: 12.5 TABLET, FILM COATED ORAL at 08:51

## 2022-01-01 RX ADMIN — INSULIN DETEMIR 20 UNITS: 100 INJECTION, SOLUTION SUBCUTANEOUS at 10:14

## 2022-01-01 RX ADMIN — HYDRALAZINE HYDROCHLORIDE 10 MG: 20 INJECTION INTRAMUSCULAR; INTRAVENOUS at 00:56

## 2022-01-01 RX ADMIN — INSULIN ASPART 4 UNITS: 100 INJECTION, SOLUTION INTRAVENOUS; SUBCUTANEOUS at 06:53

## 2022-01-01 RX ADMIN — OXYCODONE HYDROCHLORIDE AND ACETAMINOPHEN 500 MG: 500 TABLET ORAL at 09:27

## 2022-01-01 RX ADMIN — INSULIN DETEMIR 20 UNITS: 100 INJECTION, SOLUTION SUBCUTANEOUS at 09:55

## 2022-01-01 RX ADMIN — CARVEDILOL 6.25 MG: 6.25 TABLET, FILM COATED ORAL at 18:00

## 2022-01-01 RX ADMIN — Medication 10 ML: at 20:17

## 2022-01-01 RX ADMIN — INSULIN ASPART 3 UNITS: 100 INJECTION, SOLUTION INTRAVENOUS; SUBCUTANEOUS at 06:30

## 2022-01-01 RX ADMIN — BUDESONIDE AND FORMOTEROL FUMARATE DIHYDRATE 2 PUFF: 160; 4.5 AEROSOL RESPIRATORY (INHALATION) at 21:21

## 2022-01-01 RX ADMIN — ALBUTEROL SULFATE 2 PUFF: 90 AEROSOL, METERED RESPIRATORY (INHALATION) at 20:51

## 2022-01-01 RX ADMIN — DEXAMETHASONE SODIUM PHOSPHATE 10 MG: 4 INJECTION, SOLUTION INTRA-ARTICULAR; INTRALESIONAL; INTRAMUSCULAR; INTRAVENOUS; SOFT TISSUE at 12:27

## 2022-01-01 RX ADMIN — CARVEDILOL 12.5 MG: 6.25 TABLET, FILM COATED ORAL at 17:34

## 2022-01-01 RX ADMIN — AMLODIPINE BESYLATE 10 MG: 5 TABLET ORAL at 08:45

## 2022-01-01 RX ADMIN — AMLODIPINE BESYLATE 10 MG: 5 TABLET ORAL at 08:22

## 2022-01-01 RX ADMIN — Medication 10 ML: at 08:38

## 2022-01-01 RX ADMIN — BUDESONIDE AND FORMOTEROL FUMARATE DIHYDRATE 2 PUFF: 160; 4.5 AEROSOL RESPIRATORY (INHALATION) at 21:27

## 2022-01-01 RX ADMIN — HEPARIN SODIUM 5000 UNITS: 5000 INJECTION INTRAVENOUS; SUBCUTANEOUS at 08:37

## 2022-01-01 RX ADMIN — HEPARIN SODIUM 5000 UNITS: 5000 INJECTION INTRAVENOUS; SUBCUTANEOUS at 20:35

## 2022-01-01 RX ADMIN — TAMSULOSIN HYDROCHLORIDE 0.4 MG: 0.4 CAPSULE ORAL at 20:42

## 2022-01-01 RX ADMIN — HEPARIN SODIUM 5000 UNITS: 5000 INJECTION INTRAVENOUS; SUBCUTANEOUS at 08:22

## 2022-01-01 RX ADMIN — OXYCODONE HYDROCHLORIDE AND ACETAMINOPHEN 500 MG: 500 TABLET ORAL at 08:21

## 2022-01-01 RX ADMIN — IPRATROPIUM BROMIDE AND ALBUTEROL SULFATE 3 ML: .5; 3 SOLUTION RESPIRATORY (INHALATION) at 06:53

## 2022-01-01 RX ADMIN — ACETAMINOPHEN 650 MG: 325 TABLET ORAL at 08:52

## 2022-01-01 RX ADMIN — Medication 10 ML: at 23:37

## 2022-01-01 RX ADMIN — INSULIN ASPART 3 UNITS: 100 INJECTION, SOLUTION INTRAVENOUS; SUBCUTANEOUS at 11:57

## 2022-01-01 RX ADMIN — AMPICILLIN 2 G: 2 INJECTION, POWDER, FOR SOLUTION INTRAMUSCULAR; INTRAVENOUS at 08:51

## 2022-01-01 RX ADMIN — IPRATROPIUM BROMIDE AND ALBUTEROL SULFATE 3 ML: .5; 3 SOLUTION RESPIRATORY (INHALATION) at 07:05

## 2022-01-01 RX ADMIN — IPRATROPIUM BROMIDE AND ALBUTEROL SULFATE 3 ML: .5; 3 SOLUTION RESPIRATORY (INHALATION) at 19:08

## 2022-01-01 RX ADMIN — ALBUTEROL SULFATE 2 PUFF: 90 AEROSOL, METERED RESPIRATORY (INHALATION) at 20:19

## 2022-01-01 RX ADMIN — INSULIN ASPART 2 UNITS: 100 INJECTION, SOLUTION INTRAVENOUS; SUBCUTANEOUS at 18:13

## 2022-01-01 RX ADMIN — ALBUTEROL SULFATE 2 PUFF: 90 AEROSOL, METERED RESPIRATORY (INHALATION) at 15:42

## 2022-01-01 RX ADMIN — HEPARIN SODIUM 5000 UNITS: 5000 INJECTION INTRAVENOUS; SUBCUTANEOUS at 22:00

## 2022-01-01 RX ADMIN — AMPICILLIN 2 G: 2 INJECTION, POWDER, FOR SOLUTION INTRAMUSCULAR; INTRAVENOUS at 04:31

## 2022-01-01 RX ADMIN — Medication 10 ML: at 20:22

## 2022-01-01 RX ADMIN — MULTIPLE VITAMINS W/ MINERALS TAB 1 TABLET: TAB at 11:57

## 2022-01-01 RX ADMIN — CARVEDILOL 12.5 MG: 6.25 TABLET, FILM COATED ORAL at 17:00

## 2022-01-01 RX ADMIN — TAMSULOSIN HYDROCHLORIDE 0.4 MG: 0.4 CAPSULE ORAL at 20:49

## 2022-01-01 RX ADMIN — CARVEDILOL 12.5 MG: 6.25 TABLET, FILM COATED ORAL at 09:00

## 2022-01-01 RX ADMIN — OXYCODONE HYDROCHLORIDE AND ACETAMINOPHEN 500 MG: 500 TABLET ORAL at 08:00

## 2022-01-01 RX ADMIN — TAMSULOSIN HYDROCHLORIDE 0.4 MG: 0.4 CAPSULE ORAL at 22:33

## 2022-01-01 RX ADMIN — Medication 10 ML: at 08:22

## 2022-01-01 RX ADMIN — MULTIPLE VITAMINS W/ MINERALS TAB 1 TABLET: TAB at 08:38

## 2022-01-01 RX ADMIN — INSULIN ASPART 20 UNITS: 100 INJECTION, SOLUTION INTRAVENOUS; SUBCUTANEOUS at 20:17

## 2022-01-01 RX ADMIN — TAMSULOSIN HYDROCHLORIDE 0.4 MG: 0.4 CAPSULE ORAL at 20:17

## 2022-01-01 RX ADMIN — MULTIPLE VITAMINS W/ MINERALS TAB 1 TABLET: TAB at 08:40

## 2022-01-01 RX ADMIN — INSULIN DETEMIR 20 UNITS: 100 INJECTION, SOLUTION SUBCUTANEOUS at 21:12

## 2022-01-01 RX ADMIN — INSULIN DETEMIR 15 UNITS: 100 INJECTION, SOLUTION SUBCUTANEOUS at 10:47

## 2022-01-01 RX ADMIN — BUDESONIDE AND FORMOTEROL FUMARATE DIHYDRATE 2 PUFF: 160; 4.5 AEROSOL RESPIRATORY (INHALATION) at 20:34

## 2022-01-01 RX ADMIN — ASPIRIN 81 MG: 81 TABLET, COATED ORAL at 08:40

## 2022-01-01 RX ADMIN — HEPARIN SODIUM 5000 UNITS: 5000 INJECTION INTRAVENOUS; SUBCUTANEOUS at 20:50

## 2022-01-01 RX ADMIN — CARVEDILOL 12.5 MG: 6.25 TABLET, FILM COATED ORAL at 08:36

## 2022-01-01 RX ADMIN — HEPARIN SODIUM 5000 UNITS: 5000 INJECTION INTRAVENOUS; SUBCUTANEOUS at 08:15

## 2022-01-01 RX ADMIN — INSULIN ASPART 2 UNITS: 100 INJECTION, SOLUTION INTRAVENOUS; SUBCUTANEOUS at 18:03

## 2022-01-01 RX ADMIN — TAMSULOSIN HYDROCHLORIDE 0.4 MG: 0.4 CAPSULE ORAL at 21:57

## 2022-01-01 RX ADMIN — TAMSULOSIN HYDROCHLORIDE 0.4 MG: 0.4 CAPSULE ORAL at 21:10

## 2022-01-01 RX ADMIN — BUSPIRONE HYDROCHLORIDE 7.5 MG: 15 TABLET ORAL at 21:30

## 2022-01-01 RX ADMIN — ASPIRIN 81 MG: 81 TABLET, COATED ORAL at 08:50

## 2022-01-01 RX ADMIN — BUDESONIDE 0.5 MG: 0.5 SUSPENSION RESPIRATORY (INHALATION) at 19:07

## 2022-01-01 RX ADMIN — ALBUTEROL SULFATE 2 PUFF: 90 AEROSOL, METERED RESPIRATORY (INHALATION) at 10:18

## 2022-01-01 RX ADMIN — SENNOSIDES AND DOCUSATE SODIUM 2 TABLET: 50; 8.6 TABLET ORAL at 08:51

## 2022-01-01 RX ADMIN — Medication 10 ML: at 09:27

## 2022-01-01 RX ADMIN — CARVEDILOL 12.5 MG: 6.25 TABLET, FILM COATED ORAL at 18:32

## 2022-01-01 RX ADMIN — INSULIN ASPART 2 UNITS: 100 INJECTION, SOLUTION INTRAVENOUS; SUBCUTANEOUS at 12:12

## 2022-01-01 RX ADMIN — ACETAMINOPHEN 650 MG: 325 TABLET ORAL at 20:59

## 2022-01-01 RX ADMIN — BUDESONIDE AND FORMOTEROL FUMARATE DIHYDRATE 2 PUFF: 160; 4.5 AEROSOL RESPIRATORY (INHALATION) at 22:34

## 2022-01-01 RX ADMIN — HEPARIN SODIUM 5000 UNITS: 5000 INJECTION INTRAVENOUS; SUBCUTANEOUS at 11:57

## 2022-01-01 RX ADMIN — MULTIPLE VITAMINS W/ MINERALS TAB 1 TABLET: TAB at 08:22

## 2022-01-01 RX ADMIN — INSULIN ASPART 4 UNITS: 100 INJECTION, SOLUTION INTRAVENOUS; SUBCUTANEOUS at 12:23

## 2022-01-01 RX ADMIN — MULTIPLE VITAMINS W/ MINERALS TAB 1 TABLET: TAB at 08:52

## 2022-01-01 RX ADMIN — CARVEDILOL 12.5 MG: 12.5 TABLET, FILM COATED ORAL at 18:08

## 2022-01-01 RX ADMIN — VANCOMYCIN HYDROCHLORIDE 1500 MG: 1 INJECTION, POWDER, LYOPHILIZED, FOR SOLUTION INTRAVENOUS at 21:10

## 2022-01-01 RX ADMIN — ALBUTEROL SULFATE 2 PUFF: 90 AEROSOL, METERED RESPIRATORY (INHALATION) at 17:16

## 2022-01-01 RX ADMIN — ALBUTEROL SULFATE 2 PUFF: 90 AEROSOL, METERED RESPIRATORY (INHALATION) at 17:29

## 2022-01-01 RX ADMIN — CARVEDILOL 12.5 MG: 6.25 TABLET, FILM COATED ORAL at 18:28

## 2022-01-01 RX ADMIN — MULTIPLE VITAMINS W/ MINERALS TAB 1 TABLET: TAB at 08:16

## 2022-01-01 RX ADMIN — INSULIN ASPART 2 UNITS: 100 INJECTION, SOLUTION INTRAVENOUS; SUBCUTANEOUS at 06:35

## 2022-01-01 RX ADMIN — CARVEDILOL 12.5 MG: 6.25 TABLET, FILM COATED ORAL at 17:07

## 2022-01-01 RX ADMIN — OXYCODONE HYDROCHLORIDE AND ACETAMINOPHEN 500 MG: 500 TABLET ORAL at 08:52

## 2022-01-01 RX ADMIN — CARVEDILOL 12.5 MG: 12.5 TABLET, FILM COATED ORAL at 09:24

## 2022-01-01 RX ADMIN — AMLODIPINE BESYLATE 10 MG: 5 TABLET ORAL at 08:56

## 2022-01-01 RX ADMIN — SODIUM CHLORIDE 500 ML: 9 INJECTION, SOLUTION INTRAVENOUS at 21:04

## 2022-01-01 RX ADMIN — LOSARTAN POTASSIUM 50 MG: 50 TABLET, FILM COATED ORAL at 08:45

## 2022-01-01 RX ADMIN — HEPARIN SODIUM 5000 UNITS: 5000 INJECTION INTRAVENOUS; SUBCUTANEOUS at 08:40

## 2022-01-01 RX ADMIN — GUAIFENESIN 600 MG: 600 TABLET ORAL at 08:37

## 2022-01-01 RX ADMIN — Medication 1 CAPSULE: at 21:10

## 2022-01-01 RX ADMIN — GUAIFENESIN 600 MG: 600 TABLET ORAL at 21:30

## 2022-01-01 RX ADMIN — INSULIN DETEMIR 20 UNITS: 100 INJECTION, SOLUTION SUBCUTANEOUS at 21:26

## 2022-01-01 RX ADMIN — INSULIN ASPART 30 UNITS: 100 INJECTION, SOLUTION INTRAVENOUS; SUBCUTANEOUS at 23:18

## 2022-01-01 RX ADMIN — SENNOSIDES AND DOCUSATE SODIUM 1 TABLET: 50; 8.6 TABLET ORAL at 08:46

## 2022-01-01 RX ADMIN — AMLODIPINE BESYLATE 10 MG: 5 TABLET ORAL at 10:47

## 2022-01-01 RX ADMIN — ALBUTEROL SULFATE 2 PUFF: 90 AEROSOL, METERED RESPIRATORY (INHALATION) at 09:08

## 2022-01-01 RX ADMIN — CARVEDILOL 12.5 MG: 12.5 TABLET, FILM COATED ORAL at 08:40

## 2022-01-01 RX ADMIN — AMPICILLIN 2 G: 2 INJECTION, POWDER, FOR SOLUTION INTRAMUSCULAR; INTRAVENOUS at 18:08

## 2022-01-01 RX ADMIN — CARVEDILOL 12.5 MG: 12.5 TABLET, FILM COATED ORAL at 17:22

## 2022-01-01 RX ADMIN — SENNOSIDES AND DOCUSATE SODIUM 1 TABLET: 50; 8.6 TABLET ORAL at 10:29

## 2022-01-01 RX ADMIN — INSULIN ASPART 10 UNITS: 100 INJECTION, SOLUTION INTRAVENOUS; SUBCUTANEOUS at 06:37

## 2022-01-01 RX ADMIN — DEXAMETHASONE SODIUM PHOSPHATE 10 MG: 4 INJECTION, SOLUTION INTRA-ARTICULAR; INTRALESIONAL; INTRAMUSCULAR; INTRAVENOUS; SOFT TISSUE at 09:28

## 2022-01-01 RX ADMIN — AMLODIPINE BESYLATE 10 MG: 5 TABLET ORAL at 08:37

## 2022-01-01 RX ADMIN — HEPARIN SODIUM 5000 UNITS: 5000 INJECTION INTRAVENOUS; SUBCUTANEOUS at 09:23

## 2022-01-01 RX ADMIN — ACETAMINOPHEN 650 MG: 325 TABLET ORAL at 05:07

## 2022-01-01 RX ADMIN — AMLODIPINE BESYLATE 5 MG: 5 TABLET ORAL at 10:05

## 2022-01-01 RX ADMIN — TAMSULOSIN HYDROCHLORIDE 0.4 MG: 0.4 CAPSULE ORAL at 21:27

## 2022-01-01 RX ADMIN — HEPARIN SODIUM 5000 UNITS: 5000 INJECTION INTRAVENOUS; SUBCUTANEOUS at 21:56

## 2022-01-01 RX ADMIN — Medication 10 ML: at 21:17

## 2022-01-01 RX ADMIN — INSULIN DETEMIR 20 UNITS: 100 INJECTION, SOLUTION SUBCUTANEOUS at 20:17

## 2022-01-01 RX ADMIN — HEPARIN SODIUM 5000 UNITS: 5000 INJECTION INTRAVENOUS; SUBCUTANEOUS at 21:16

## 2022-01-01 RX ADMIN — INSULIN ASPART 3 UNITS: 100 INJECTION, SOLUTION INTRAVENOUS; SUBCUTANEOUS at 06:42

## 2022-01-01 RX ADMIN — ALBUTEROL SULFATE 2 PUFF: 90 AEROSOL, METERED RESPIRATORY (INHALATION) at 17:07

## 2022-01-01 RX ADMIN — INSULIN DETEMIR 20 UNITS: 100 INJECTION, SOLUTION SUBCUTANEOUS at 08:30

## 2022-01-01 RX ADMIN — OXYCODONE HYDROCHLORIDE AND ACETAMINOPHEN 500 MG: 500 TABLET ORAL at 10:06

## 2022-01-01 RX ADMIN — BUDESONIDE AND FORMOTEROL FUMARATE DIHYDRATE 2 PUFF: 160; 4.5 AEROSOL RESPIRATORY (INHALATION) at 22:36

## 2022-01-01 RX ADMIN — AMLODIPINE BESYLATE 10 MG: 5 TABLET ORAL at 08:16

## 2022-01-01 RX ADMIN — HEPARIN SODIUM 5000 UNITS: 5000 INJECTION INTRAVENOUS; SUBCUTANEOUS at 20:17

## 2022-01-01 RX ADMIN — IPRATROPIUM BROMIDE AND ALBUTEROL SULFATE 3 ML: .5; 3 SOLUTION RESPIRATORY (INHALATION) at 19:44

## 2022-01-01 RX ADMIN — TAMSULOSIN HYDROCHLORIDE 0.4 MG: 0.4 CAPSULE ORAL at 21:16

## 2022-01-01 RX ADMIN — ALBUTEROL SULFATE 2 PUFF: 90 AEROSOL, METERED RESPIRATORY (INHALATION) at 17:13

## 2022-01-01 RX ADMIN — AMLODIPINE BESYLATE 10 MG: 5 TABLET ORAL at 08:00

## 2022-01-01 RX ADMIN — SODIUM CHLORIDE 500 MG: 900 INJECTION, SOLUTION INTRAVENOUS at 00:23

## 2022-01-01 RX ADMIN — DEXAMETHASONE SODIUM PHOSPHATE 10 MG: 4 INJECTION, SOLUTION INTRA-ARTICULAR; INTRALESIONAL; INTRAMUSCULAR; INTRAVENOUS; SOFT TISSUE at 09:01

## 2022-01-01 RX ADMIN — INSULIN DETEMIR 20 UNITS: 100 INJECTION, SOLUTION SUBCUTANEOUS at 08:36

## 2022-01-01 RX ADMIN — AMPICILLIN 2 G: 2 INJECTION, POWDER, FOR SOLUTION INTRAMUSCULAR; INTRAVENOUS at 13:54

## 2022-01-01 RX ADMIN — BUDESONIDE AND FORMOTEROL FUMARATE DIHYDRATE 2 PUFF: 160; 4.5 AEROSOL RESPIRATORY (INHALATION) at 20:52

## 2022-01-01 RX ADMIN — TAMSULOSIN HYDROCHLORIDE 0.4 MG: 0.4 CAPSULE ORAL at 20:40

## 2022-01-01 RX ADMIN — HEPARIN SODIUM 5000 UNITS: 5000 INJECTION INTRAVENOUS; SUBCUTANEOUS at 09:26

## 2022-01-01 RX ADMIN — CARVEDILOL 12.5 MG: 6.25 TABLET, FILM COATED ORAL at 08:16

## 2022-01-01 RX ADMIN — CARVEDILOL 12.5 MG: 6.25 TABLET, FILM COATED ORAL at 09:53

## 2022-01-01 RX ADMIN — AMPICILLIN 2 G: 2 INJECTION, POWDER, FOR SOLUTION INTRAMUSCULAR; INTRAVENOUS at 04:14

## 2022-01-01 RX ADMIN — AMPICILLIN 2 G: 2 INJECTION, POWDER, FOR SOLUTION INTRAMUSCULAR; INTRAVENOUS at 17:22

## 2022-01-01 RX ADMIN — ALBUTEROL SULFATE 2 PUFF: 90 AEROSOL, METERED RESPIRATORY (INHALATION) at 17:10

## 2022-01-01 RX ADMIN — MULTIPLE VITAMINS W/ MINERALS TAB 1 TABLET: TAB at 08:36

## 2022-01-01 RX ADMIN — HEPARIN SODIUM 5000 UNITS: 5000 INJECTION INTRAVENOUS; SUBCUTANEOUS at 09:08

## 2022-01-01 RX ADMIN — Medication 10 ML: at 21:13

## 2022-01-01 RX ADMIN — CEFTRIAXONE 1 G: 1 INJECTION, SOLUTION INTRAVENOUS at 23:37

## 2022-01-01 RX ADMIN — ENOXAPARIN SODIUM 40 MG: 40 INJECTION SUBCUTANEOUS at 08:40

## 2022-01-01 RX ADMIN — HEPARIN SODIUM 5000 UNITS: 5000 INJECTION INTRAVENOUS; SUBCUTANEOUS at 20:59

## 2022-01-01 RX ADMIN — HEPARIN SODIUM 5000 UNITS: 5000 INJECTION INTRAVENOUS; SUBCUTANEOUS at 20:46

## 2022-01-01 RX ADMIN — FUROSEMIDE 40 MG: 10 INJECTION, SOLUTION INTRAMUSCULAR; INTRAVENOUS at 17:48

## 2022-01-01 RX ADMIN — INSULIN DETEMIR 20 UNITS: 100 INJECTION, SOLUTION SUBCUTANEOUS at 20:53

## 2022-01-01 RX ADMIN — VANCOMYCIN HYDROCHLORIDE 1500 MG: 1 INJECTION, POWDER, LYOPHILIZED, FOR SOLUTION INTRAVENOUS at 21:26

## 2022-01-01 RX ADMIN — HEPARIN SODIUM 5000 UNITS: 5000 INJECTION INTRAVENOUS; SUBCUTANEOUS at 10:47

## 2022-01-01 RX ADMIN — INSULIN ASPART 3 UNITS: 100 INJECTION, SOLUTION INTRAVENOUS; SUBCUTANEOUS at 11:51

## 2022-01-01 RX ADMIN — INSULIN DETEMIR 20 UNITS: 100 INJECTION, SOLUTION SUBCUTANEOUS at 21:56

## 2022-01-01 RX ADMIN — AMPICILLIN 2 G: 2 INJECTION, POWDER, FOR SOLUTION INTRAMUSCULAR; INTRAVENOUS at 00:24

## 2022-01-01 RX ADMIN — ALBUTEROL SULFATE 2 PUFF: 90 AEROSOL, METERED RESPIRATORY (INHALATION) at 08:21

## 2022-01-01 RX ADMIN — GUAIFENESIN 600 MG: 600 TABLET ORAL at 09:24

## 2022-01-01 RX ADMIN — ALBUTEROL SULFATE 2 PUFF: 90 AEROSOL, METERED RESPIRATORY (INHALATION) at 20:42

## 2022-01-01 RX ADMIN — HEPARIN SODIUM 5000 UNITS: 5000 INJECTION INTRAVENOUS; SUBCUTANEOUS at 20:42

## 2022-01-01 RX ADMIN — CARVEDILOL 6.25 MG: 6.25 TABLET, FILM COATED ORAL at 18:15

## 2022-01-01 RX ADMIN — CEFEPIME HYDROCHLORIDE 2 G: 2 INJECTION, SOLUTION INTRAVENOUS at 05:18

## 2022-01-01 RX ADMIN — TAMSULOSIN HYDROCHLORIDE 0.4 MG: 0.4 CAPSULE ORAL at 21:25

## 2022-01-01 RX ADMIN — Medication 10 ML: at 10:48

## 2022-01-01 RX ADMIN — ALBUTEROL SULFATE 2 PUFF: 90 AEROSOL, METERED RESPIRATORY (INHALATION) at 08:42

## 2022-01-01 RX ADMIN — INSULIN DETEMIR 20 UNITS: 100 INJECTION, SOLUTION SUBCUTANEOUS at 21:27

## 2022-01-01 RX ADMIN — CARVEDILOL 12.5 MG: 6.25 TABLET, FILM COATED ORAL at 17:13

## 2022-01-01 RX ADMIN — Medication 10 ML: at 11:41

## 2022-01-01 RX ADMIN — LOSARTAN POTASSIUM 100 MG: 50 TABLET, FILM COATED ORAL at 09:53

## 2022-01-01 RX ADMIN — ALBUTEROL SULFATE 2 PUFF: 90 AEROSOL, METERED RESPIRATORY (INHALATION) at 22:14

## 2022-01-01 RX ADMIN — ASPIRIN 81 MG: 81 TABLET, COATED ORAL at 08:48

## 2022-01-01 RX ADMIN — ALBUTEROL SULFATE 2 PUFF: 90 AEROSOL, METERED RESPIRATORY (INHALATION) at 22:35

## 2022-01-01 RX ADMIN — INSULIN DETEMIR 15 UNITS: 100 INJECTION, SOLUTION SUBCUTANEOUS at 20:53

## 2022-01-01 RX ADMIN — ALBUTEROL SULFATE 2 PUFF: 90 AEROSOL, METERED RESPIRATORY (INHALATION) at 12:26

## 2022-01-01 RX ADMIN — OXYCODONE HYDROCHLORIDE AND ACETAMINOPHEN 500 MG: 500 TABLET ORAL at 11:57

## 2022-01-01 RX ADMIN — BUDESONIDE AND FORMOTEROL FUMARATE DIHYDRATE 2 PUFF: 160; 4.5 AEROSOL RESPIRATORY (INHALATION) at 08:55

## 2022-01-01 RX ADMIN — DEXAMETHASONE SODIUM PHOSPHATE 10 MG: 4 INJECTION, SOLUTION INTRA-ARTICULAR; INTRALESIONAL; INTRAMUSCULAR; INTRAVENOUS; SOFT TISSUE at 08:36

## 2022-01-01 RX ADMIN — ALBUTEROL SULFATE 2 PUFF: 90 AEROSOL, METERED RESPIRATORY (INHALATION) at 21:58

## 2022-01-01 RX ADMIN — Medication 1 CAPSULE: at 20:39

## 2022-01-01 RX ADMIN — SENNOSIDES AND DOCUSATE SODIUM 2 TABLET: 50; 8.6 TABLET ORAL at 21:10

## 2022-01-01 RX ADMIN — FUROSEMIDE 20 MG: 10 INJECTION, SOLUTION INTRAMUSCULAR; INTRAVENOUS at 17:13

## 2022-01-01 RX ADMIN — INSULIN ASPART 3 UNITS: 100 INJECTION, SOLUTION INTRAVENOUS; SUBCUTANEOUS at 17:38

## 2022-01-01 RX ADMIN — ALBUTEROL SULFATE 2 PUFF: 90 AEROSOL, METERED RESPIRATORY (INHALATION) at 21:25

## 2022-01-01 RX ADMIN — MULTIPLE VITAMINS W/ MINERALS TAB 1 TABLET: TAB at 08:21

## 2022-01-01 RX ADMIN — BUDESONIDE AND FORMOTEROL FUMARATE DIHYDRATE 2 PUFF: 160; 4.5 AEROSOL RESPIRATORY (INHALATION) at 09:54

## 2022-01-01 RX ADMIN — INSULIN DETEMIR 15 UNITS: 100 INJECTION, SOLUTION SUBCUTANEOUS at 14:29

## 2022-01-01 RX ADMIN — BUSPIRONE HYDROCHLORIDE 7.5 MG: 15 TABLET ORAL at 08:50

## 2022-01-01 RX ADMIN — CEFEPIME HYDROCHLORIDE 2 G: 2 INJECTION, SOLUTION INTRAVENOUS at 05:39

## 2022-01-01 RX ADMIN — INSULIN DETEMIR 20 UNITS: 100 INJECTION, SOLUTION SUBCUTANEOUS at 22:35

## 2022-01-01 RX ADMIN — ALBUTEROL SULFATE 2 PUFF: 90 AEROSOL, METERED RESPIRATORY (INHALATION) at 20:35

## 2022-01-01 RX ADMIN — Medication 1 CAPSULE: at 08:36

## 2022-01-01 RX ADMIN — Medication 10 ML: at 22:35

## 2022-01-01 RX ADMIN — TAMSULOSIN HYDROCHLORIDE 0.4 MG: 0.4 CAPSULE ORAL at 20:20

## 2022-01-01 RX ADMIN — AMPICILLIN 2 G: 2 INJECTION, POWDER, FOR SOLUTION INTRAMUSCULAR; INTRAVENOUS at 08:40

## 2022-01-01 RX ADMIN — BUSPIRONE HYDROCHLORIDE 10 MG: 10 TABLET ORAL at 13:54

## 2022-01-01 RX ADMIN — DEXAMETHASONE SODIUM PHOSPHATE 10 MG: 4 INJECTION, SOLUTION INTRA-ARTICULAR; INTRALESIONAL; INTRAMUSCULAR; INTRAVENOUS; SOFT TISSUE at 11:41

## 2022-01-01 RX ADMIN — CARVEDILOL 12.5 MG: 6.25 TABLET, FILM COATED ORAL at 08:22

## 2022-01-01 RX ADMIN — Medication 10 ML: at 22:34

## 2022-01-01 RX ADMIN — HEPARIN SODIUM 5000 UNITS: 5000 INJECTION INTRAVENOUS; SUBCUTANEOUS at 09:53

## 2022-01-01 RX ADMIN — CARVEDILOL 12.5 MG: 6.25 TABLET, FILM COATED ORAL at 17:38

## 2022-01-01 RX ADMIN — AMLODIPINE BESYLATE 10 MG: 5 TABLET ORAL at 09:23

## 2022-01-01 RX ADMIN — Medication 1 CAPSULE: at 08:40

## 2022-01-01 RX ADMIN — OXYCODONE HYDROCHLORIDE AND ACETAMINOPHEN 500 MG: 500 TABLET ORAL at 08:36

## 2022-01-01 RX ADMIN — AMLODIPINE BESYLATE 10 MG: 5 TABLET ORAL at 08:40

## 2022-01-01 RX ADMIN — CEFEPIME HYDROCHLORIDE 2 G: 2 INJECTION, SOLUTION INTRAVENOUS at 17:34

## 2022-01-01 RX ADMIN — TAMSULOSIN HYDROCHLORIDE 0.4 MG: 0.4 CAPSULE ORAL at 22:00

## 2022-01-01 RX ADMIN — BUDESONIDE AND FORMOTEROL FUMARATE DIHYDRATE 2 PUFF: 160; 4.5 AEROSOL RESPIRATORY (INHALATION) at 21:15

## 2022-01-01 RX ADMIN — ALBUTEROL SULFATE 2 PUFF: 90 AEROSOL, METERED RESPIRATORY (INHALATION) at 16:52

## 2022-01-01 RX ADMIN — INSULIN DETEMIR 20 UNITS: 100 INJECTION, SOLUTION SUBCUTANEOUS at 10:16

## 2022-01-01 RX ADMIN — Medication 10 ML: at 09:00

## 2022-01-01 RX ADMIN — ALBUTEROL SULFATE 2 PUFF: 90 AEROSOL, METERED RESPIRATORY (INHALATION) at 12:53

## 2022-01-01 RX ADMIN — AMLODIPINE BESYLATE 10 MG: 5 TABLET ORAL at 08:35

## 2022-01-01 RX ADMIN — Medication 3 ML: at 13:50

## 2022-01-01 RX ADMIN — BUDESONIDE AND FORMOTEROL FUMARATE DIHYDRATE 2 PUFF: 160; 4.5 AEROSOL RESPIRATORY (INHALATION) at 08:41

## 2022-01-01 RX ADMIN — POLYETHYLENE GLYCOL 3350 17 G: 17 POWDER, FOR SOLUTION ORAL at 09:27

## 2022-01-01 RX ADMIN — CARVEDILOL 6.25 MG: 6.25 TABLET, FILM COATED ORAL at 08:52

## 2022-01-01 RX ADMIN — INSULIN ASPART 3 UNITS: 100 INJECTION, SOLUTION INTRAVENOUS; SUBCUTANEOUS at 12:04

## 2022-01-01 RX ADMIN — Medication 3 ML: at 08:51

## 2022-01-01 RX ADMIN — ALBUTEROL SULFATE 2 PUFF: 90 AEROSOL, METERED RESPIRATORY (INHALATION) at 21:27

## 2022-01-01 RX ADMIN — INSULIN ASPART 14 UNITS: 100 INJECTION, SOLUTION INTRAVENOUS; SUBCUTANEOUS at 17:12

## 2022-01-01 RX ADMIN — Medication 10 ML: at 10:15

## 2022-01-01 RX ADMIN — Medication 5 MG: at 20:21

## 2022-01-01 RX ADMIN — ACETAMINOPHEN 650 MG: 325 TABLET ORAL at 09:47

## 2022-01-01 RX ADMIN — SENNOSIDES AND DOCUSATE SODIUM 1 TABLET: 50; 8.6 TABLET ORAL at 21:57

## 2022-01-01 RX ADMIN — CARVEDILOL 12.5 MG: 6.25 TABLET, FILM COATED ORAL at 08:40

## 2022-01-01 RX ADMIN — BUDESONIDE AND FORMOTEROL FUMARATE DIHYDRATE 2 PUFF: 160; 4.5 AEROSOL RESPIRATORY (INHALATION) at 10:09

## 2022-01-01 RX ADMIN — Medication 3 ML: at 09:23

## 2022-01-01 RX ADMIN — ALBUTEROL SULFATE 2 PUFF: 90 AEROSOL, METERED RESPIRATORY (INHALATION) at 18:13

## 2022-01-01 RX ADMIN — CARVEDILOL 12.5 MG: 6.25 TABLET, FILM COATED ORAL at 09:26

## 2022-01-01 RX ADMIN — INSULIN ASPART 4 UNITS: 100 INJECTION, SOLUTION INTRAVENOUS; SUBCUTANEOUS at 11:29

## 2022-01-01 RX ADMIN — Medication 10 ML: at 10:10

## 2022-01-01 RX ADMIN — INSULIN DETEMIR 10 UNITS: 100 INJECTION, SOLUTION SUBCUTANEOUS at 21:00

## 2022-01-01 RX ADMIN — INSULIN DETEMIR 15 UNITS: 100 INJECTION, SOLUTION SUBCUTANEOUS at 08:23

## 2022-01-01 RX ADMIN — OXYCODONE HYDROCHLORIDE AND ACETAMINOPHEN 500 MG: 500 TABLET ORAL at 09:08

## 2022-01-01 RX ADMIN — AMLODIPINE BESYLATE 10 MG: 5 TABLET ORAL at 09:25

## 2022-01-01 RX ADMIN — ALBUTEROL SULFATE 2 PUFF: 90 AEROSOL, METERED RESPIRATORY (INHALATION) at 11:45

## 2022-01-01 RX ADMIN — AMPICILLIN 2 G: 2 INJECTION, POWDER, FOR SOLUTION INTRAMUSCULAR; INTRAVENOUS at 01:35

## 2022-01-01 RX ADMIN — DEXAMETHASONE SODIUM PHOSPHATE 10 MG: 4 INJECTION, SOLUTION INTRA-ARTICULAR; INTRALESIONAL; INTRAMUSCULAR; INTRAVENOUS; SOFT TISSUE at 11:00

## 2022-01-01 RX ADMIN — AMLODIPINE BESYLATE 10 MG: 5 TABLET ORAL at 09:53

## 2022-01-01 RX ADMIN — MULTIPLE VITAMINS W/ MINERALS TAB 1 TABLET: TAB at 09:53

## 2022-01-01 RX ADMIN — ALBUTEROL SULFATE 2 PUFF: 90 AEROSOL, METERED RESPIRATORY (INHALATION) at 20:53

## 2022-01-01 RX ADMIN — CARVEDILOL 12.5 MG: 6.25 TABLET, FILM COATED ORAL at 08:56

## 2022-01-01 RX ADMIN — TAMSULOSIN HYDROCHLORIDE 0.4 MG: 0.4 CAPSULE ORAL at 20:21

## 2022-01-01 RX ADMIN — OXYCODONE HYDROCHLORIDE AND ACETAMINOPHEN 500 MG: 500 TABLET ORAL at 09:47

## 2022-01-01 RX ADMIN — ALBUTEROL SULFATE 2 PUFF: 90 AEROSOL, METERED RESPIRATORY (INHALATION) at 08:53

## 2022-01-01 RX ADMIN — FUROSEMIDE 40 MG: 10 INJECTION, SOLUTION INTRAMUSCULAR; INTRAVENOUS at 22:00

## 2022-01-01 RX ADMIN — ALBUTEROL SULFATE 2 PUFF: 90 AEROSOL, METERED RESPIRATORY (INHALATION) at 12:43

## 2022-01-01 RX ADMIN — SENNOSIDES AND DOCUSATE SODIUM 1 TABLET: 50; 8.6 TABLET ORAL at 21:16

## 2022-01-01 RX ADMIN — INSULIN ASPART 3 UNITS: 100 INJECTION, SOLUTION INTRAVENOUS; SUBCUTANEOUS at 18:22

## 2022-01-01 RX ADMIN — BUDESONIDE 0.5 MG: 0.5 SUSPENSION RESPIRATORY (INHALATION) at 19:44

## 2022-01-01 RX ADMIN — CARVEDILOL 12.5 MG: 6.25 TABLET, FILM COATED ORAL at 08:45

## 2022-01-01 RX ADMIN — LOSARTAN POTASSIUM 100 MG: 50 TABLET, FILM COATED ORAL at 08:15

## 2022-01-01 RX ADMIN — INSULIN DETEMIR 15 UNITS: 100 INJECTION, SOLUTION SUBCUTANEOUS at 09:58

## 2022-01-01 RX ADMIN — INSULIN ASPART 8 UNITS: 100 INJECTION, SOLUTION INTRAVENOUS; SUBCUTANEOUS at 17:35

## 2022-01-01 RX ADMIN — IOPAMIDOL 100 ML: 612 INJECTION, SOLUTION INTRAVENOUS at 05:05

## 2022-01-01 RX ADMIN — ALBUTEROL SULFATE 2 PUFF: 90 AEROSOL, METERED RESPIRATORY (INHALATION) at 20:55

## 2022-01-01 RX ADMIN — HEPARIN SODIUM 5000 UNITS: 5000 INJECTION INTRAVENOUS; SUBCUTANEOUS at 21:10

## 2022-01-01 RX ADMIN — BUDESONIDE AND FORMOTEROL FUMARATE DIHYDRATE 2 PUFF: 160; 4.5 AEROSOL RESPIRATORY (INHALATION) at 20:20

## 2022-01-01 RX ADMIN — INSULIN ASPART 4 UNITS: 100 INJECTION, SOLUTION INTRAVENOUS; SUBCUTANEOUS at 06:57

## 2022-01-01 RX ADMIN — INSULIN DETEMIR 15 UNITS: 100 INJECTION, SOLUTION SUBCUTANEOUS at 21:38

## 2022-01-01 RX ADMIN — ALBUTEROL SULFATE 2 PUFF: 90 AEROSOL, METERED RESPIRATORY (INHALATION) at 13:00

## 2022-01-01 RX ADMIN — SODIUM CHLORIDE 1000 ML: 9 INJECTION, SOLUTION INTRAVENOUS at 07:43

## 2022-01-01 RX ADMIN — INSULIN DETEMIR 20 UNITS: 100 INJECTION, SOLUTION SUBCUTANEOUS at 21:17

## 2022-01-01 RX ADMIN — IPRATROPIUM BROMIDE AND ALBUTEROL SULFATE 3 ML: .5; 3 SOLUTION RESPIRATORY (INHALATION) at 20:41

## 2022-01-01 RX ADMIN — BUDESONIDE AND FORMOTEROL FUMARATE DIHYDRATE 2 PUFF: 160; 4.5 AEROSOL RESPIRATORY (INHALATION) at 09:08

## 2022-01-01 RX ADMIN — AMLODIPINE BESYLATE 10 MG: 5 TABLET ORAL at 09:47

## 2022-01-01 RX ADMIN — IPRATROPIUM BROMIDE AND ALBUTEROL SULFATE 3 ML: .5; 3 SOLUTION RESPIRATORY (INHALATION) at 18:58

## 2022-01-01 RX ADMIN — POLYETHYLENE GLYCOL 3350 17 G: 17 POWDER, FOR SOLUTION ORAL at 08:46

## 2022-01-01 RX ADMIN — Medication 3 ML: at 21:27

## 2022-01-01 RX ADMIN — FUROSEMIDE 40 MG: 10 INJECTION, SOLUTION INTRAMUSCULAR; INTRAVENOUS at 17:46

## 2022-01-01 RX ADMIN — GUAIFENESIN 600 MG: 600 TABLET ORAL at 08:51

## 2022-01-01 RX ADMIN — INSULIN ASPART 2 UNITS: 100 INJECTION, SOLUTION INTRAVENOUS; SUBCUTANEOUS at 06:33

## 2022-01-01 RX ADMIN — INSULIN ASPART 2 UNITS: 100 INJECTION, SOLUTION INTRAVENOUS; SUBCUTANEOUS at 06:45

## 2022-01-01 RX ADMIN — Medication 10 ML: at 11:58

## 2022-01-01 RX ADMIN — SENNOSIDES AND DOCUSATE SODIUM 1 TABLET: 50; 8.6 TABLET ORAL at 08:01

## 2022-01-01 RX ADMIN — CARVEDILOL 12.5 MG: 6.25 TABLET, FILM COATED ORAL at 10:47

## 2022-01-01 RX ADMIN — ALBUTEROL SULFATE 2 PUFF: 90 AEROSOL, METERED RESPIRATORY (INHALATION) at 20:20

## 2022-01-01 RX ADMIN — DEXAMETHASONE SODIUM PHOSPHATE 10 MG: 4 INJECTION, SOLUTION INTRA-ARTICULAR; INTRALESIONAL; INTRAMUSCULAR; INTRAVENOUS; SOFT TISSUE at 11:19

## 2022-01-01 RX ADMIN — HEPARIN SODIUM 5000 UNITS: 5000 INJECTION INTRAVENOUS; SUBCUTANEOUS at 09:47

## 2022-01-01 RX ADMIN — TAMSULOSIN HYDROCHLORIDE 0.4 MG: 0.4 CAPSULE ORAL at 21:30

## 2022-01-01 RX ADMIN — IPRATROPIUM BROMIDE AND ALBUTEROL SULFATE 3 ML: .5; 3 SOLUTION RESPIRATORY (INHALATION) at 13:08

## 2022-01-01 RX ADMIN — Medication 10 ML: at 08:36

## 2022-01-01 RX ADMIN — CARVEDILOL 12.5 MG: 6.25 TABLET, FILM COATED ORAL at 08:21

## 2022-01-01 RX ADMIN — CARVEDILOL 12.5 MG: 6.25 TABLET, FILM COATED ORAL at 17:16

## 2022-01-01 RX ADMIN — INSULIN ASPART 4 UNITS: 100 INJECTION, SOLUTION INTRAVENOUS; SUBCUTANEOUS at 07:10

## 2022-01-01 RX ADMIN — INSULIN ASPART 4 UNITS: 100 INJECTION, SOLUTION INTRAVENOUS; SUBCUTANEOUS at 12:31

## 2022-01-01 RX ADMIN — ALBUTEROL SULFATE 2 PUFF: 90 AEROSOL, METERED RESPIRATORY (INHALATION) at 08:40

## 2022-01-01 RX ADMIN — AMPICILLIN 2 G: 2 INJECTION, POWDER, FOR SOLUTION INTRAMUSCULAR; INTRAVENOUS at 20:39

## 2022-01-01 RX ADMIN — INSULIN ASPART 5 UNITS: 100 INJECTION, SOLUTION INTRAVENOUS; SUBCUTANEOUS at 17:10

## 2022-01-01 RX ADMIN — FUROSEMIDE 20 MG: 10 INJECTION, SOLUTION INTRAMUSCULAR; INTRAVENOUS at 09:29

## 2022-01-01 RX ADMIN — INSULIN ASPART 6 UNITS: 100 INJECTION, SOLUTION INTRAVENOUS; SUBCUTANEOUS at 17:07

## 2022-01-01 RX ADMIN — BUDESONIDE 0.5 MG: 0.5 SUSPENSION RESPIRATORY (INHALATION) at 06:53

## 2022-01-01 RX ADMIN — Medication 10 ML: at 08:00

## 2022-01-01 RX ADMIN — HEPARIN SODIUM 5000 UNITS: 5000 INJECTION INTRAVENOUS; SUBCUTANEOUS at 21:25

## 2022-01-01 RX ADMIN — SENNOSIDES AND DOCUSATE SODIUM 2 TABLET: 50; 8.6 TABLET ORAL at 09:24

## 2022-01-01 RX ADMIN — INSULIN DETEMIR 20 UNITS: 100 INJECTION, SOLUTION SUBCUTANEOUS at 09:21

## 2022-01-01 RX ADMIN — INSULIN DETEMIR 20 UNITS: 100 INJECTION, SOLUTION SUBCUTANEOUS at 21:25

## 2022-01-01 RX ADMIN — SENNOSIDES AND DOCUSATE SODIUM 1 TABLET: 50; 8.6 TABLET ORAL at 21:10

## 2022-01-01 RX ADMIN — IPRATROPIUM BROMIDE AND ALBUTEROL SULFATE 3 ML: .5; 3 SOLUTION RESPIRATORY (INHALATION) at 06:55

## 2022-01-01 RX ADMIN — MULTIPLE VITAMINS W/ MINERALS TAB 1 TABLET: TAB at 09:23

## 2022-01-01 RX ADMIN — CARVEDILOL 12.5 MG: 6.25 TABLET, FILM COATED ORAL at 10:08

## 2022-01-01 RX ADMIN — CARVEDILOL 12.5 MG: 6.25 TABLET, FILM COATED ORAL at 18:43

## 2022-01-01 RX ADMIN — ALBUTEROL SULFATE 2 PUFF: 90 AEROSOL, METERED RESPIRATORY (INHALATION) at 11:26

## 2022-01-01 RX ADMIN — CARVEDILOL 6.25 MG: 6.25 TABLET, FILM COATED ORAL at 18:03

## 2022-01-01 RX ADMIN — INSULIN ASPART 4 UNITS: 100 INJECTION, SOLUTION INTRAVENOUS; SUBCUTANEOUS at 17:29

## 2022-01-01 RX ADMIN — AMLODIPINE BESYLATE 10 MG: 5 TABLET ORAL at 08:51

## 2022-01-01 RX ADMIN — HEPARIN SODIUM 5000 UNITS: 5000 INJECTION INTRAVENOUS; SUBCUTANEOUS at 08:52

## 2022-01-01 RX ADMIN — INSULIN ASPART 5 UNITS: 100 INJECTION, SOLUTION INTRAVENOUS; SUBCUTANEOUS at 12:42

## 2022-01-01 RX ADMIN — SENNOSIDES AND DOCUSATE SODIUM 1 TABLET: 50; 8.6 TABLET ORAL at 08:38

## 2022-01-01 RX ADMIN — Medication 10 ML: at 08:40

## 2022-01-01 RX ADMIN — FUROSEMIDE 80 MG: 10 INJECTION, SOLUTION INTRAMUSCULAR; INTRAVENOUS at 03:49

## 2022-01-01 RX ADMIN — ENOXAPARIN SODIUM 40 MG: 40 INJECTION SUBCUTANEOUS at 08:36

## 2022-01-01 RX ADMIN — BUDESONIDE AND FORMOTEROL FUMARATE DIHYDRATE 2 PUFF: 160; 4.5 AEROSOL RESPIRATORY (INHALATION) at 20:30

## 2022-01-01 RX ADMIN — CEFEPIME HYDROCHLORIDE 2 G: 2 INJECTION, SOLUTION INTRAVENOUS at 05:25

## 2022-01-01 RX ADMIN — INSULIN ASPART 3 UNITS: 100 INJECTION, SOLUTION INTRAVENOUS; SUBCUTANEOUS at 11:31

## 2022-01-01 RX ADMIN — INSULIN ASPART 3 UNITS: 100 INJECTION, SOLUTION INTRAVENOUS; SUBCUTANEOUS at 06:47

## 2022-01-01 RX ADMIN — HEPARIN SODIUM 5000 UNITS: 5000 INJECTION INTRAVENOUS; SUBCUTANEOUS at 08:46

## 2022-01-01 RX ADMIN — ALBUTEROL SULFATE 2 PUFF: 90 AEROSOL, METERED RESPIRATORY (INHALATION) at 13:38

## 2022-01-01 RX ADMIN — AMLODIPINE BESYLATE 10 MG: 5 TABLET ORAL at 08:52

## 2022-01-01 RX ADMIN — Medication 10 ML: at 20:50

## 2022-01-01 RX ADMIN — LOSARTAN POTASSIUM 100 MG: 50 TABLET, FILM COATED ORAL at 08:35

## 2022-01-01 RX ADMIN — BUDESONIDE AND FORMOTEROL FUMARATE DIHYDRATE 2 PUFF: 160; 4.5 AEROSOL RESPIRATORY (INHALATION) at 11:45

## 2022-01-01 RX ADMIN — OXYCODONE HYDROCHLORIDE AND ACETAMINOPHEN 500 MG: 500 TABLET ORAL at 08:22

## 2022-01-01 RX ADMIN — LOSARTAN POTASSIUM 50 MG: 50 TABLET, FILM COATED ORAL at 09:23

## 2022-01-01 RX ADMIN — HEPARIN SODIUM 5000 UNITS: 5000 INJECTION INTRAVENOUS; SUBCUTANEOUS at 08:21

## 2022-01-01 RX ADMIN — AMLODIPINE BESYLATE 10 MG: 5 TABLET ORAL at 11:57

## 2022-01-01 RX ADMIN — INSULIN ASPART 2 UNITS: 100 INJECTION, SOLUTION INTRAVENOUS; SUBCUTANEOUS at 12:00

## 2022-01-01 RX ADMIN — ALBUTEROL SULFATE 2 PUFF: 90 AEROSOL, METERED RESPIRATORY (INHALATION) at 17:05

## 2022-01-01 RX ADMIN — SENNOSIDES AND DOCUSATE SODIUM 2 TABLET: 50; 8.6 TABLET ORAL at 08:40

## 2022-01-01 RX ADMIN — INSULIN DETEMIR 15 UNITS: 100 INJECTION, SOLUTION SUBCUTANEOUS at 11:56

## 2022-01-01 RX ADMIN — LOSARTAN POTASSIUM 50 MG: 50 TABLET, FILM COATED ORAL at 08:01

## 2022-01-01 RX ADMIN — HYDRALAZINE HYDROCHLORIDE 10 MG: 20 INJECTION INTRAMUSCULAR; INTRAVENOUS at 09:58

## 2022-01-01 RX ADMIN — Medication 10 ML: at 20:26

## 2022-01-01 RX ADMIN — INSULIN DETEMIR 20 UNITS: 100 INJECTION, SOLUTION SUBCUTANEOUS at 10:00

## 2022-01-01 RX ADMIN — ALBUTEROL SULFATE 2 PUFF: 90 AEROSOL, METERED RESPIRATORY (INHALATION) at 11:38

## 2022-01-01 RX ADMIN — INSULIN ASPART 2 UNITS: 100 INJECTION, SOLUTION INTRAVENOUS; SUBCUTANEOUS at 18:29

## 2022-01-01 RX ADMIN — ALBUTEROL SULFATE 2 PUFF: 90 AEROSOL, METERED RESPIRATORY (INHALATION) at 17:30

## 2022-01-01 RX ADMIN — MULTIPLE VITAMINS W/ MINERALS TAB 1 TABLET: TAB at 08:35

## 2022-01-01 RX ADMIN — AMLODIPINE BESYLATE 10 MG: 5 TABLET ORAL at 08:21

## 2022-01-01 RX ADMIN — AMLODIPINE BESYLATE 10 MG: 5 TABLET ORAL at 10:16

## 2022-01-01 RX ADMIN — SENNOSIDES AND DOCUSATE SODIUM 1 TABLET: 50; 8.6 TABLET ORAL at 20:50

## 2022-01-01 RX ADMIN — INSULIN ASPART 5 UNITS: 100 INJECTION, SOLUTION INTRAVENOUS; SUBCUTANEOUS at 11:29

## 2022-01-01 RX ADMIN — INSULIN ASPART 4 UNITS: 100 INJECTION, SOLUTION INTRAVENOUS; SUBCUTANEOUS at 12:24

## 2022-01-01 RX ADMIN — ALBUTEROL SULFATE 2 PUFF: 90 AEROSOL, METERED RESPIRATORY (INHALATION) at 09:26

## 2022-01-01 RX ADMIN — HEPARIN SODIUM 5000 UNITS: 5000 INJECTION INTRAVENOUS; SUBCUTANEOUS at 10:09

## 2022-01-01 RX ADMIN — CARVEDILOL 12.5 MG: 6.25 TABLET, FILM COATED ORAL at 18:13

## 2022-01-01 RX ADMIN — MULTIPLE VITAMINS W/ MINERALS TAB 1 TABLET: TAB at 09:47

## 2022-01-01 RX ADMIN — ALBUTEROL SULFATE 2 PUFF: 90 AEROSOL, METERED RESPIRATORY (INHALATION) at 07:59

## 2022-01-01 RX ADMIN — ALBUTEROL SULFATE 2 PUFF: 90 AEROSOL, METERED RESPIRATORY (INHALATION) at 22:36

## 2022-01-01 RX ADMIN — ALBUTEROL SULFATE 2 PUFF: 90 AEROSOL, METERED RESPIRATORY (INHALATION) at 11:30

## 2022-01-01 RX ADMIN — MULTIPLE VITAMINS W/ MINERALS TAB 1 TABLET: TAB at 09:26

## 2022-01-01 RX ADMIN — DEXAMETHASONE SODIUM PHOSPHATE 10 MG: 4 INJECTION, SOLUTION INTRA-ARTICULAR; INTRALESIONAL; INTRAMUSCULAR; INTRAVENOUS; SOFT TISSUE at 09:30

## 2022-01-01 RX ADMIN — INSULIN ASPART 2 UNITS: 100 INJECTION, SOLUTION INTRAVENOUS; SUBCUTANEOUS at 06:53

## 2022-01-01 RX ADMIN — MULTIPLE VITAMINS W/ MINERALS TAB 1 TABLET: TAB at 08:45

## 2022-01-01 RX ADMIN — Medication 10 ML: at 21:56

## 2022-01-01 RX ADMIN — INSULIN DETEMIR 20 UNITS: 100 INJECTION, SOLUTION SUBCUTANEOUS at 08:18

## 2022-01-01 RX ADMIN — BUDESONIDE AND FORMOTEROL FUMARATE DIHYDRATE 2 PUFF: 160; 4.5 AEROSOL RESPIRATORY (INHALATION) at 19:50

## 2022-01-01 RX ADMIN — TAMSULOSIN HYDROCHLORIDE 0.4 MG: 0.4 CAPSULE ORAL at 20:50

## 2022-01-01 RX ADMIN — GUAIFENESIN 600 MG: 600 TABLET ORAL at 20:40

## 2022-01-01 RX ADMIN — POLYETHYLENE GLYCOL 3350 17 G: 17 POWDER, FOR SOLUTION ORAL at 08:37

## 2022-01-01 RX ADMIN — INSULIN ASPART 6 UNITS: 100 INJECTION, SOLUTION INTRAVENOUS; SUBCUTANEOUS at 17:17

## 2022-01-01 RX ADMIN — HEPARIN SODIUM 5000 UNITS: 5000 INJECTION INTRAVENOUS; SUBCUTANEOUS at 08:36

## 2022-01-01 RX ADMIN — ALBUTEROL SULFATE 2 PUFF: 90 AEROSOL, METERED RESPIRATORY (INHALATION) at 08:17

## 2022-01-01 RX ADMIN — HEPARIN SODIUM 5000 UNITS: 5000 INJECTION INTRAVENOUS; SUBCUTANEOUS at 20:21

## 2022-01-01 RX ADMIN — HEPARIN SODIUM 5000 UNITS: 5000 INJECTION INTRAVENOUS; SUBCUTANEOUS at 20:55

## 2022-01-01 RX ADMIN — INSULIN DETEMIR 20 UNITS: 100 INJECTION, SOLUTION SUBCUTANEOUS at 21:11

## 2022-01-01 RX ADMIN — HEPARIN SODIUM 5000 UNITS: 5000 INJECTION INTRAVENOUS; SUBCUTANEOUS at 21:12

## 2022-01-01 RX ADMIN — TAMSULOSIN HYDROCHLORIDE 0.4 MG: 0.4 CAPSULE ORAL at 20:59

## 2022-01-01 RX ADMIN — LOSARTAN POTASSIUM 100 MG: 50 TABLET, FILM COATED ORAL at 09:08

## 2022-01-01 RX ADMIN — Medication 10 ML: at 11:20

## 2022-01-01 RX ADMIN — OXYCODONE HYDROCHLORIDE AND ACETAMINOPHEN 500 MG: 500 TABLET ORAL at 10:08

## 2022-01-01 RX ADMIN — CEFEPIME HYDROCHLORIDE 2 G: 2 INJECTION, SOLUTION INTRAVENOUS at 04:44

## 2022-01-01 RX ADMIN — TAMSULOSIN HYDROCHLORIDE 0.4 MG: 0.4 CAPSULE ORAL at 20:46

## 2022-01-01 RX ADMIN — OXYCODONE HYDROCHLORIDE AND ACETAMINOPHEN 500 MG: 500 TABLET ORAL at 08:45

## 2022-01-01 RX ADMIN — ALPRAZOLAM 0.5 MG: 0.5 TABLET ORAL at 10:29

## 2022-01-01 RX ADMIN — INSULIN ASPART 2 UNITS: 100 INJECTION, SOLUTION INTRAVENOUS; SUBCUTANEOUS at 18:00

## 2022-01-01 RX ADMIN — Medication 10 ML: at 20:20

## 2022-01-01 RX ADMIN — BUSPIRONE HYDROCHLORIDE 7.5 MG: 15 TABLET ORAL at 14:46

## 2022-01-01 RX ADMIN — ACETAMINOPHEN 650 MG: 325 TABLET ORAL at 00:32

## 2022-01-01 RX ADMIN — GUAIFENESIN 600 MG: 600 TABLET ORAL at 08:40

## 2022-01-01 RX ADMIN — INSULIN ASPART 5 UNITS: 100 INJECTION, SOLUTION INTRAVENOUS; SUBCUTANEOUS at 17:56

## 2022-01-01 RX ADMIN — OXYCODONE HYDROCHLORIDE AND ACETAMINOPHEN 500 MG: 500 TABLET ORAL at 08:40

## 2022-01-01 RX ADMIN — CARVEDILOL 12.5 MG: 12.5 TABLET, FILM COATED ORAL at 08:37

## 2022-01-01 RX ADMIN — LOSARTAN POTASSIUM 100 MG: 50 TABLET, FILM COATED ORAL at 08:56

## 2022-01-01 RX ADMIN — INSULIN DETEMIR 20 UNITS: 100 INJECTION, SOLUTION SUBCUTANEOUS at 09:12

## 2022-01-01 RX ADMIN — CARVEDILOL 12.5 MG: 6.25 TABLET, FILM COATED ORAL at 08:00

## 2022-01-01 RX ADMIN — ALBUTEROL SULFATE 2 PUFF: 90 AEROSOL, METERED RESPIRATORY (INHALATION) at 12:05

## 2022-01-01 RX ADMIN — ALBUTEROL SULFATE 2 PUFF: 90 AEROSOL, METERED RESPIRATORY (INHALATION) at 11:57

## 2022-01-01 RX ADMIN — MULTIPLE VITAMINS W/ MINERALS TAB 1 TABLET: TAB at 08:00

## 2022-01-01 RX ADMIN — ENOXAPARIN SODIUM 40 MG: 40 INJECTION SUBCUTANEOUS at 17:13

## 2022-01-01 RX ADMIN — HEPARIN SODIUM 5000 UNITS: 5000 INJECTION INTRAVENOUS; SUBCUTANEOUS at 21:24

## 2022-01-01 RX ADMIN — AMPICILLIN 2 G: 2 INJECTION, POWDER, FOR SOLUTION INTRAMUSCULAR; INTRAVENOUS at 12:46

## 2022-01-01 RX ADMIN — ALBUTEROL SULFATE 2 PUFF: 90 AEROSOL, METERED RESPIRATORY (INHALATION) at 09:51

## 2022-01-01 RX ADMIN — CARVEDILOL 12.5 MG: 6.25 TABLET, FILM COATED ORAL at 09:23

## 2022-01-01 RX ADMIN — AMLODIPINE BESYLATE 10 MG: 5 TABLET ORAL at 10:08

## 2022-01-01 RX ADMIN — GUAIFENESIN 600 MG: 600 TABLET ORAL at 21:27

## 2022-01-01 RX ADMIN — MULTIPLE VITAMINS W/ MINERALS TAB 1 TABLET: TAB at 08:56

## 2022-01-01 RX ADMIN — INSULIN DETEMIR 15 UNITS: 100 INJECTION, SOLUTION SUBCUTANEOUS at 20:27

## 2022-01-01 RX ADMIN — Medication 10 ML: at 10:06

## 2022-01-01 RX ADMIN — METHYLPREDNISOLONE SODIUM SUCCINATE 125 MG: 125 INJECTION, POWDER, FOR SOLUTION INTRAMUSCULAR; INTRAVENOUS at 03:10

## 2022-01-01 RX ADMIN — CARVEDILOL 12.5 MG: 12.5 TABLET, FILM COATED ORAL at 17:57

## 2022-01-01 RX ADMIN — Medication 5 MG: at 20:50

## 2022-01-01 RX ADMIN — AMLODIPINE BESYLATE 5 MG: 5 TABLET ORAL at 18:03

## 2022-01-01 RX ADMIN — ALBUTEROL SULFATE 2 PUFF: 90 AEROSOL, METERED RESPIRATORY (INHALATION) at 08:35

## 2022-01-01 RX ADMIN — BUDESONIDE AND FORMOTEROL FUMARATE DIHYDRATE 2 PUFF: 160; 4.5 AEROSOL RESPIRATORY (INHALATION) at 09:25

## 2022-01-01 RX ADMIN — INSULIN DETEMIR 20 UNITS: 100 INJECTION, SOLUTION SUBCUTANEOUS at 21:32

## 2022-01-01 RX ADMIN — Medication 1 CAPSULE: at 21:27

## 2022-01-01 RX ADMIN — CARVEDILOL 12.5 MG: 6.25 TABLET, FILM COATED ORAL at 11:57

## 2022-01-01 RX ADMIN — INSULIN ASPART 4 UNITS: 100 INJECTION, SOLUTION INTRAVENOUS; SUBCUTANEOUS at 17:08

## 2022-01-01 RX ADMIN — INSULIN ASPART 3 UNITS: 100 INJECTION, SOLUTION INTRAVENOUS; SUBCUTANEOUS at 06:33

## 2022-01-01 RX ADMIN — ALBUTEROL SULFATE 2 PUFF: 90 AEROSOL, METERED RESPIRATORY (INHALATION) at 12:12

## 2022-01-01 RX ADMIN — VANCOMYCIN HYDROCHLORIDE 1500 MG: 1 INJECTION, POWDER, LYOPHILIZED, FOR SOLUTION INTRAVENOUS at 20:40

## 2022-01-01 RX ADMIN — BUDESONIDE AND FORMOTEROL FUMARATE DIHYDRATE 2 PUFF: 160; 4.5 AEROSOL RESPIRATORY (INHALATION) at 22:35

## 2022-01-01 RX ADMIN — MULTIPLE VITAMINS W/ MINERALS TAB 1 TABLET: TAB at 08:51

## 2022-01-01 RX ADMIN — CARVEDILOL 12.5 MG: 6.25 TABLET, FILM COATED ORAL at 10:16

## 2022-01-01 RX ADMIN — CARVEDILOL 12.5 MG: 12.5 TABLET, FILM COATED ORAL at 17:34

## 2022-01-01 RX ADMIN — DEXAMETHASONE SODIUM PHOSPHATE 10 MG: 4 INJECTION, SOLUTION INTRA-ARTICULAR; INTRALESIONAL; INTRAMUSCULAR; INTRAVENOUS; SOFT TISSUE at 10:30

## 2022-01-01 RX ADMIN — ALBUTEROL SULFATE 2 PUFF: 90 AEROSOL, METERED RESPIRATORY (INHALATION) at 08:54

## 2022-01-01 RX ADMIN — ALBUTEROL SULFATE 2 PUFF: 90 AEROSOL, METERED RESPIRATORY (INHALATION) at 19:54

## 2022-01-01 RX ADMIN — TAMSULOSIN HYDROCHLORIDE 0.4 MG: 0.4 CAPSULE ORAL at 20:35

## 2022-01-01 RX ADMIN — Medication 5 MG: at 20:59

## 2022-01-01 RX ADMIN — HEPARIN SODIUM 5000 UNITS: 5000 INJECTION INTRAVENOUS; SUBCUTANEOUS at 20:20

## 2022-01-01 RX ADMIN — BUDESONIDE AND FORMOTEROL FUMARATE DIHYDRATE 2 PUFF: 160; 4.5 AEROSOL RESPIRATORY (INHALATION) at 08:35

## 2022-01-01 RX ADMIN — INSULIN ASPART 3 UNITS: 100 INJECTION, SOLUTION INTRAVENOUS; SUBCUTANEOUS at 17:19

## 2022-01-01 RX ADMIN — Medication 10 ML: at 21:27

## 2022-01-01 RX ADMIN — Medication 1 CAPSULE: at 21:30

## 2022-01-01 RX ADMIN — Medication 10 ML: at 09:28

## 2022-01-01 RX ADMIN — AMPICILLIN 2 G: 2 INJECTION, POWDER, FOR SOLUTION INTRAMUSCULAR; INTRAVENOUS at 21:30

## 2022-01-01 RX ADMIN — OXYCODONE HYDROCHLORIDE AND ACETAMINOPHEN 500 MG: 500 TABLET ORAL at 10:47

## 2022-01-01 RX ADMIN — INSULIN DETEMIR 15 UNITS: 100 INJECTION, SOLUTION SUBCUTANEOUS at 22:00

## 2022-01-01 RX ADMIN — MULTIPLE VITAMINS W/ MINERALS TAB 1 TABLET: TAB at 10:16

## 2022-01-01 RX ADMIN — ALBUTEROL SULFATE 2 PUFF: 90 AEROSOL, METERED RESPIRATORY (INHALATION) at 12:04

## 2022-01-01 RX ADMIN — INSULIN DETEMIR 20 UNITS: 100 INJECTION, SOLUTION SUBCUTANEOUS at 09:03

## 2022-01-01 RX ADMIN — INSULIN ASPART 3 UNITS: 100 INJECTION, SOLUTION INTRAVENOUS; SUBCUTANEOUS at 12:02

## 2022-01-01 RX ADMIN — MULTIPLE VITAMINS W/ MINERALS TAB 1 TABLET: TAB at 10:08

## 2022-01-01 RX ADMIN — SENNOSIDES AND DOCUSATE SODIUM 1 TABLET: 50; 8.6 TABLET ORAL at 09:26

## 2022-01-01 RX ADMIN — OXYCODONE HYDROCHLORIDE AND ACETAMINOPHEN 500 MG: 500 TABLET ORAL at 08:35

## 2022-01-01 RX ADMIN — IOPAMIDOL 100 ML: 612 INJECTION, SOLUTION INTRAVENOUS at 20:41

## 2022-01-01 RX ADMIN — BUDESONIDE 0.5 MG: 0.5 SUSPENSION RESPIRATORY (INHALATION) at 18:58

## 2022-01-01 RX ADMIN — HEPARIN SODIUM 5000 UNITS: 5000 INJECTION INTRAVENOUS; SUBCUTANEOUS at 10:16

## 2022-01-01 RX ADMIN — INSULIN ASPART 6 UNITS: 100 INJECTION, SOLUTION INTRAVENOUS; SUBCUTANEOUS at 11:58

## 2022-01-01 RX ADMIN — ALBUTEROL SULFATE 2 PUFF: 90 AEROSOL, METERED RESPIRATORY (INHALATION) at 17:41

## 2022-01-01 RX ADMIN — LOSARTAN POTASSIUM 100 MG: 50 TABLET, FILM COATED ORAL at 10:08

## 2022-01-01 RX ADMIN — Medication 10 ML: at 08:46

## 2022-01-01 RX ADMIN — HEPARIN SODIUM 5000 UNITS: 5000 INJECTION INTRAVENOUS; SUBCUTANEOUS at 20:49

## 2022-01-01 RX ADMIN — SENNOSIDES AND DOCUSATE SODIUM 2 TABLET: 50; 8.6 TABLET ORAL at 08:36

## 2022-01-01 RX ADMIN — SENNOSIDES AND DOCUSATE SODIUM 2 TABLET: 50; 8.6 TABLET ORAL at 20:39

## 2022-01-01 RX ADMIN — TAMSULOSIN HYDROCHLORIDE 0.4 MG: 0.4 CAPSULE ORAL at 20:55

## 2022-01-01 RX ADMIN — Medication 10 ML: at 20:35

## 2022-01-01 RX ADMIN — CEFEPIME HYDROCHLORIDE 2 G: 2 INJECTION, SOLUTION INTRAVENOUS at 17:57

## 2022-01-01 RX ADMIN — MULTIPLE VITAMINS W/ MINERALS TAB 1 TABLET: TAB at 09:24

## 2022-01-01 RX ADMIN — FUROSEMIDE 40 MG: 10 INJECTION, SOLUTION INTRAMUSCULAR; INTRAVENOUS at 14:12

## 2022-01-01 RX ADMIN — Medication 10 ML: at 08:52

## 2022-01-01 RX ADMIN — INSULIN DETEMIR 20 UNITS: 100 INJECTION, SOLUTION SUBCUTANEOUS at 20:35

## 2022-01-01 RX ADMIN — AMLODIPINE BESYLATE 10 MG: 5 TABLET ORAL at 09:08

## 2022-01-01 RX ADMIN — Medication 10 ML: at 22:00

## 2022-01-01 RX ADMIN — GUAIFENESIN 600 MG: 600 TABLET ORAL at 17:14

## 2022-01-01 RX ADMIN — Medication 10 ML: at 08:15

## 2022-01-01 RX ADMIN — Medication 10 ML: at 21:25

## 2022-01-01 RX ADMIN — MULTIPLE VITAMINS W/ MINERALS TAB 1 TABLET: TAB at 09:08

## 2022-01-01 RX ADMIN — Medication 10 ML: at 09:47

## 2022-01-01 RX ADMIN — POLYETHYLENE GLYCOL 3350 17 G: 17 POWDER, FOR SOLUTION ORAL at 10:29

## 2022-01-01 RX ADMIN — OXYCODONE HYDROCHLORIDE AND ACETAMINOPHEN 500 MG: 500 TABLET ORAL at 08:57

## 2022-01-01 RX ADMIN — ALPRAZOLAM 0.5 MG: 0.5 TABLET ORAL at 14:12

## 2022-01-01 RX ADMIN — INSULIN ASPART 2 UNITS: 100 INJECTION, SOLUTION INTRAVENOUS; SUBCUTANEOUS at 13:38

## 2022-01-01 RX ADMIN — HEPARIN SODIUM 5000 UNITS: 5000 INJECTION INTRAVENOUS; SUBCUTANEOUS at 08:58

## 2022-01-01 RX ADMIN — INSULIN ASPART 10 UNITS: 100 INJECTION, SOLUTION INTRAVENOUS; SUBCUTANEOUS at 12:20

## 2022-01-01 RX ADMIN — CARVEDILOL 12.5 MG: 6.25 TABLET, FILM COATED ORAL at 17:45

## 2022-01-01 RX ADMIN — ALBUTEROL SULFATE 2 PUFF: 90 AEROSOL, METERED RESPIRATORY (INHALATION) at 20:25

## 2022-01-01 RX ADMIN — CEFEPIME HYDROCHLORIDE 2 G: 2 INJECTION, SOLUTION INTRAVENOUS at 17:16

## 2022-01-01 RX ADMIN — CARVEDILOL 12.5 MG: 6.25 TABLET, FILM COATED ORAL at 17:05

## 2022-01-01 RX ADMIN — Medication 5 MG: at 20:46

## 2022-01-01 RX ADMIN — BUDESONIDE AND FORMOTEROL FUMARATE DIHYDRATE 2 PUFF: 160; 4.5 AEROSOL RESPIRATORY (INHALATION) at 10:28

## 2022-01-01 RX ADMIN — LOSARTAN POTASSIUM 100 MG: 50 TABLET, FILM COATED ORAL at 08:21

## 2022-01-01 RX ADMIN — INSULIN ASPART 3 UNITS: 100 INJECTION, SOLUTION INTRAVENOUS; SUBCUTANEOUS at 17:05

## 2022-01-01 RX ADMIN — INSULIN ASPART 7 UNITS: 100 INJECTION, SOLUTION INTRAVENOUS; SUBCUTANEOUS at 17:45

## 2022-01-01 RX ADMIN — ENOXAPARIN SODIUM 40 MG: 40 INJECTION SUBCUTANEOUS at 09:24

## 2022-01-01 RX ADMIN — ALBUTEROL SULFATE 2 PUFF: 90 AEROSOL, METERED RESPIRATORY (INHALATION) at 16:10

## 2022-01-01 RX ADMIN — BUDESONIDE AND FORMOTEROL FUMARATE DIHYDRATE 2 PUFF: 160; 4.5 AEROSOL RESPIRATORY (INHALATION) at 08:36

## 2022-01-01 RX ADMIN — OXYCODONE HYDROCHLORIDE AND ACETAMINOPHEN 500 MG: 500 TABLET ORAL at 10:16

## 2022-01-01 RX ADMIN — VANCOMYCIN HYDROCHLORIDE 2000 MG: 5 INJECTION, POWDER, LYOPHILIZED, FOR SOLUTION INTRAVENOUS at 05:49

## 2022-01-01 RX ADMIN — OXYCODONE HYDROCHLORIDE AND ACETAMINOPHEN 500 MG: 500 TABLET ORAL at 08:16

## 2022-01-01 RX ADMIN — ALBUTEROL SULFATE 2 PUFF: 90 AEROSOL, METERED RESPIRATORY (INHALATION) at 17:44

## 2022-01-01 RX ADMIN — Medication 1 CAPSULE: at 08:51

## 2022-01-01 RX ADMIN — ALBUTEROL SULFATE 2 PUFF: 90 AEROSOL, METERED RESPIRATORY (INHALATION) at 10:09

## 2022-01-01 RX ADMIN — INSULIN ASPART 4 UNITS: 100 INJECTION, SOLUTION INTRAVENOUS; SUBCUTANEOUS at 18:00

## 2022-01-01 RX ADMIN — ALBUTEROL SULFATE 2 PUFF: 90 AEROSOL, METERED RESPIRATORY (INHALATION) at 17:00

## 2022-01-01 RX ADMIN — ALBUTEROL SULFATE 2 PUFF: 90 AEROSOL, METERED RESPIRATORY (INHALATION) at 21:20

## 2022-01-01 RX ADMIN — Medication 10 ML: at 20:47

## 2022-01-01 RX ADMIN — MULTIPLE VITAMINS W/ MINERALS TAB 1 TABLET: TAB at 17:14

## 2022-01-01 RX ADMIN — INSULIN ASPART 3 UNITS: 100 INJECTION, SOLUTION INTRAVENOUS; SUBCUTANEOUS at 07:00

## 2022-01-01 RX ADMIN — ALBUTEROL SULFATE 2 PUFF: 90 AEROSOL, METERED RESPIRATORY (INHALATION) at 15:57

## 2022-01-01 RX ADMIN — OXYCODONE HYDROCHLORIDE AND ACETAMINOPHEN 500 MG: 500 TABLET ORAL at 09:23

## 2022-01-01 RX ADMIN — BUDESONIDE 0.5 MG: 0.5 SUSPENSION RESPIRATORY (INHALATION) at 07:05

## 2022-01-01 RX ADMIN — IPRATROPIUM BROMIDE AND ALBUTEROL SULFATE 3 ML: .5; 3 SOLUTION RESPIRATORY (INHALATION) at 13:06

## 2022-01-01 RX ADMIN — CARVEDILOL 12.5 MG: 12.5 TABLET, FILM COATED ORAL at 17:14

## 2022-01-01 RX ADMIN — OXYCODONE HYDROCHLORIDE AND ACETAMINOPHEN 500 MG: 500 TABLET ORAL at 09:53

## 2022-01-01 RX ADMIN — BUDESONIDE AND FORMOTEROL FUMARATE DIHYDRATE 2 PUFF: 160; 4.5 AEROSOL RESPIRATORY (INHALATION) at 08:30

## 2022-01-01 RX ADMIN — ALBUTEROL SULFATE 2 PUFF: 90 AEROSOL, METERED RESPIRATORY (INHALATION) at 21:15

## 2022-01-01 RX ADMIN — CARVEDILOL 12.5 MG: 6.25 TABLET, FILM COATED ORAL at 17:19

## 2022-01-01 RX ADMIN — Medication 10 ML: at 19:52

## 2022-01-01 RX ADMIN — ALBUTEROL SULFATE 2 PUFF: 90 AEROSOL, METERED RESPIRATORY (INHALATION) at 18:01

## 2022-01-01 RX ADMIN — ALBUTEROL SULFATE 2 PUFF: 90 AEROSOL, METERED RESPIRATORY (INHALATION) at 08:22

## 2022-01-01 RX ADMIN — HEPARIN SODIUM 5000 UNITS: 5000 INJECTION INTRAVENOUS; SUBCUTANEOUS at 08:01

## 2022-01-01 RX ADMIN — BUDESONIDE AND FORMOTEROL FUMARATE DIHYDRATE 2 PUFF: 160; 4.5 AEROSOL RESPIRATORY (INHALATION) at 21:57

## 2022-01-01 RX ADMIN — LOSARTAN POTASSIUM 100 MG: 50 TABLET, FILM COATED ORAL at 22:33

## 2022-01-01 RX ADMIN — INSULIN ASPART 5 UNITS: 100 INJECTION, SOLUTION INTRAVENOUS; SUBCUTANEOUS at 11:44

## 2022-01-01 RX ADMIN — MULTIPLE VITAMINS W/ MINERALS TAB 1 TABLET: TAB at 10:47

## 2022-01-01 RX ADMIN — CARVEDILOL 12.5 MG: 6.25 TABLET, FILM COATED ORAL at 17:10

## 2022-01-01 RX ADMIN — SENNOSIDES AND DOCUSATE SODIUM 1 TABLET: 50; 8.6 TABLET ORAL at 20:20

## 2022-01-01 RX ADMIN — ENOXAPARIN SODIUM 40 MG: 40 INJECTION SUBCUTANEOUS at 08:51

## 2022-01-01 RX ADMIN — INSULIN ASPART 3 UNITS: 100 INJECTION, SOLUTION INTRAVENOUS; SUBCUTANEOUS at 12:26

## 2022-01-01 RX ADMIN — INSULIN ASPART 7 UNITS: 100 INJECTION, SOLUTION INTRAVENOUS; SUBCUTANEOUS at 16:52

## 2022-01-01 RX ADMIN — CARVEDILOL 6.25 MG: 6.25 TABLET, FILM COATED ORAL at 09:47

## 2022-01-01 RX ADMIN — Medication 10 ML: at 09:23

## 2022-01-01 RX ADMIN — AMLODIPINE BESYLATE 10 MG: 5 TABLET ORAL at 09:26

## 2022-01-01 RX ADMIN — Medication 3 ML: at 21:30

## 2022-01-01 RX ADMIN — Medication 10 ML: at 21:00

## 2022-01-01 RX ADMIN — BUDESONIDE 0.5 MG: 0.5 SUSPENSION RESPIRATORY (INHALATION) at 06:55

## 2022-01-01 RX ADMIN — DEXAMETHASONE SODIUM PHOSPHATE 10 MG: 4 INJECTION, SOLUTION INTRA-ARTICULAR; INTRALESIONAL; INTRAMUSCULAR; INTRAVENOUS; SOFT TISSUE at 08:22

## 2022-01-01 RX ADMIN — SENNOSIDES AND DOCUSATE SODIUM 2 TABLET: 50; 8.6 TABLET ORAL at 21:26

## 2022-05-25 PROBLEM — J12.82 PNEUMONIA DUE TO COVID-19 VIRUS: Status: ACTIVE | Noted: 2022-01-01

## 2022-05-25 PROBLEM — U07.1 PNEUMONIA DUE TO COVID-19 VIRUS: Status: ACTIVE | Noted: 2022-01-01

## 2022-05-26 PROBLEM — I10 ESSENTIAL HYPERTENSION: Status: ACTIVE | Noted: 2022-01-01

## 2022-05-26 PROBLEM — N17.9 AKI (ACUTE KIDNEY INJURY): Status: ACTIVE | Noted: 2022-01-01

## 2022-05-26 PROBLEM — J96.01 ACUTE RESPIRATORY FAILURE WITH HYPOXIA: Status: ACTIVE | Noted: 2022-01-01

## 2022-06-01 NOTE — PLAN OF CARE
Goal Outcome Evaluation:           Progress: improving  Outcome Evaluation: VSS.  Oxygenation maintained on 15L O2 via humidified, high flow nasal cannula with 15L O2 via nonrebreather mask.  No complaints during shift.  Continued cardiac monitoring as ordered.  Intake and output monitored and documented.  Accu-checks as ordered.  Isolation maintained per protocol.  No acute events noted during shift.  Will continue to monitor patient.

## 2022-06-01 NOTE — THERAPY EVALUATION
Patient Name: Gene Mobley  : 3/14/1933    MRN: [id number]                              Today's Date: 2022       Admit Date: 2022    Visit Dx:     ICD-10-CM ICD-9-CM   1. Pneumonia due to COVID-19 virus  U07.1 480.8    J12.82 079.89   2. Hypoxia  R09.02 799.02   3. GOMEZ (acute kidney injury) (HCC)  N17.9 584.9     Patient Active Problem List   Diagnosis   • Pneumonia due to COVID-19 virus   • GOMEZ (acute kidney injury) (HCC)   • Acute respiratory failure with hypoxia (HCC)   • Essential hypertension     Past Medical History:   Diagnosis Date   • Diabetes mellitus (HCC)    • Hypertension    • Impaired functional mobility, balance, gait, and endurance      Past Surgical History:   Procedure Laterality Date   • HERNIA REPAIR        General Information     Row Name 22 1250          OT Time and Intention    Document Type evaluation  -SD     Mode of Treatment occupational therapy  -SD     Row Name 22 1250          General Information    Patient Profile Reviewed yes  -SD     Prior Level of Function independent:;all household mobility  -SD     Existing Precautions/Restrictions fall;oxygen therapy device and L/min  -SD     Barriers to Rehab medically complex  -SD     Row Name 22 1250          Living Environment    People in Home spouse  -SD     Row Name 22 1250          Home Main Entrance    Number of Stairs, Main Entrance none  ramp entry  -SD     Row Name 22 1250          Cognition    Orientation Status (Cognition) oriented x 4  -SD     Row Name 22 1250          Safety Issues, Functional Mobility    Safety Issues Affecting Function (Mobility) safety precautions follow-through/compliance;safety precaution awareness  -SD     Impairments Affecting Function (Mobility) balance;endurance/activity tolerance;shortness of breath;strength  -SD           User Key  (r) = Recorded By, (t) = Taken By, (c) = Cosigned By    Initials Name Provider Type    Jenifer Castle OT Occupational  Therapist                 Mobility/ADL's     Row Name 06/01/22 1251          Bed Mobility    Bed Mobility rolling left;rolling right  -SD     Rolling Left [place] (Bed Mobility) minimum assist (75% patient effort)  -SD     Rolling Right [place] (Bed Mobility) minimum assist (75% patient effort)  -SD     Assistive Device (Bed Mobility) bed rails;head of bed elevated  -SD     Comment, (Bed Mobility) rolling for perineal hygiene and brief change; desatted to 88% when supine  -SD     Row Name 06/01/22 1251          Transfers    Comment, (Transfers) NT d/t fatigue  -SD     Row Name 06/01/22 1251          Functional Mobility    Functional Mobility- Ind. Level not tested  -SD     Row Name 06/01/22 1251          Activities of Daily Living    BADL Assessment/Intervention bathing;upper body dressing;lower body dressing;grooming;feeding;toileting  -SD     Row Name 06/01/22 1251          Bathing Assessment/Intervention    [place] Level (Bathing) upper body;minimum assist (75% patient effort);distal lower extremities/feet;perineal area;maximum assist (25% patient effort)  -SD     Position (Bathing) sitting up in bed;supine  -SD     Row Name 06/01/22 1251          Upper Body Dressing Assessment/Training    [place] Level (Upper Body Dressing) minimum assist (75% patient effort)  -SD     Position (Upper Body Dressing) sitting up in bed  -SD     Row Name 06/01/22 1251          Lower Body Dressing Assessment/Training    [place] Level (Lower Body Dressing) don;socks;pants/bottoms;maximum assist (25% patient effort)  -SD     Position (Lower Body Dressing) supine  -SD     Row Name 06/01/22 1251          Grooming Assessment/Training    [place] Level (Grooming) hair care, combing/brushing;oral care regimen;wash face, hands;contact guard assist  -SD     Position (Grooming) sitting up in bed  -SD     Row Name 06/01/22 1251          Self-Feeding Assessment/Training    [place] Level (Feeding) supervision   -SD     Position (Self-Feeding) sitting up in bed  -SD     Row Name 06/01/22 1251          Toileting Assessment/Training    [place] Level (Toileting) maximum assist (25% patient effort)  -SD     Assistive Devices (Toileting) urinal;bedpan  -SD     Comment, (Toileting) min A for urinal, max A for bedpan  -SD           User Key  (r) = Recorded By, (t) = Taken By, (c) = Cosigned By    Initials Name Provider Type    Jenifer Castle OT Occupational Therapist               Obj/Interventions     Row Name 06/01/22 1255          Range of Motion Comprehensive    General Range of Motion bilateral upper extremity ROM WFL  -SD     Row Name 06/01/22 1255          Strength Comprehensive (MMT)    General Manual Muscle Testing (MMT) Assessment upper extremity strength deficits identified  -SD     Comment, General Manual Muscle Testing (MMT) Assessment UB 3+/5  -SD           User Key  (r) = Recorded By, (t) = Taken By, (c) = Cosigned By    Initials Name Provider Type    Jenifer Castle OT Occupational Therapist               Goals/Plan     [facility] Name 06/01/22 1304          Transfer Goal 1 (OT)    Activity/Assistive Device (Transfer Goal 1, OT) sit-to-stand/stand-to-sit;walker, rolling  -SD     [place] Level/Cues Needed (Transfer Goal 1, OT) contact guard required  -SD     Time Frame (Transfer Goal 1, OT) long term goal (LTG)  -SD     Progress/Outcome (Transfer Goal 1, OT) goal ongoing  -SD     [facility] Name 06/01/22 1304          Dressing Goal 1 (OT)    Activity/Device (Dressing Goal 1, OT) lower body dressing  -SD     [place]/Cues Needed (Dressing Goal 1, OT) minimum assist (75% or more patient effort)  -SD     Time Frame (Dressing Goal 1, OT) 2 weeks  -SD     Progress/Outcome (Dressing Goal 1, OT) goal ongoing  -SD     [facility] Name 06/01/22 1304          Toileting Goal 1 (OT)    Activity/Device (Toileting Goal 1, OT) toileting skills, all;commode, bedside without drop arms  -SD     [place] Level/Cues Needed  (Toileting Goal 1, OT) minimum assist (75% or more patient effort)  -SD     Time Frame (Toileting Goal 1, OT) 2 weeks  -SD     Progress/Outcome (Toileting Goal 1, OT) goal ongoing  -SD     [facility] Name 06/01/22 1304          Strength Goal 1 (OT)    Strength Goal 1 (OT) Patient to perform UB ther ex as tolerated, maintaining O2 sats with activity.  -SD     Time Frame (Strength Goal 1, OT) long term goal (LTG)  -SD     Progress/Outcome (Strength Goal 1, OT) goal ongoing  -SD     [facility] Name 06/01/22 1304          Therapy Assessment/Plan (OT)    Planned Therapy Interventions (OT) activity tolerance training;adaptive equipment training;BADL retraining;patient/caregiver education/training;ROM/therapeutic exercise;strengthening exercise;transfer/mobility retraining  -SD           User Key  (r) = Recorded By, (t) = Taken By, (c) = Cosigned By    Initials Name Provider Type    Jenifer Castle OT Occupational Therapist               Clinical Impression     Row Name 06/01/22 1255          Pain Assessment    Pretreatment Pain Rating 0/10 - no pain  -SD     Posttreatment Pain Rating 0/10 - no pain  -SD     Row Name 06/01/22 1255          Plan of Care Review    Plan of Care Reviewed With patient  -SD     Progress no change  -SD     Outcome Evaluation OT eval completed. Patient presents deficits in strength, endurance, balance, mobility and ADL performance. Patient completed rolling left to right with min A for perineal hygiene and brief change, requiring max A. Patient desatted to 88% when laying flat. Patient placed in fowlers and completed UBB and UBD with min A, requires max A for LB self care. Patient completed BUE AROM 5 reps, 1 set. Patient reported feeling very fatigued with this amount of activity. O2 ranged 96, 88, 97 before, during, after activity. Patient is expected to benefit from continued OT services prior to DC to address generalized weakness and deconditioning. Patient may require STR.  -SD     [facility] Name 06/01/22  1255          Therapy Assessment/Plan (OT)    Patient/Family Therapy Goal Statement (OT) Increase strength and mobility  -SD     Rehab Potential (OT) good, to achieve stated therapy goals  -SD     Criteria for Skilled Therapeutic Interventions Met (OT) skilled treatment is necessary  -SD     Therapy Frequency (OT) 3 times/wk  5 times if indicated  -SD     Row Name 06/01/22 1255          Therapy Plan Review/Discharge Plan (OT)    Anticipated Discharge Disposition (OT) inpatient rehabilitation facility  -SD     Row Name 06/01/22 1255          Vital Signs    Pre SpO2 (%) 96  -SD     O2 Delivery Pre Treatment hi-flow  15L hi flow and NRB  -SD     Intra SpO2 (%) 88  -SD     O2 Delivery Intra Treatment hi-flow  -SD     Post SpO2 (%) 97  -SD     O2 Delivery Post Treatment hi-flow  -SD     Row Name 06/01/22 1255          Positioning and Restraints    Pre-Treatment Position in bed  -SD     Post Treatment Position bed  -SD     In Bed fowlers;call light within reach;encouraged to call for assist  -SD           User Key  (r) = Recorded By, (t) = Taken By, (c) = Cosigned By    Initials Name Provider Type    Jenifer Castle OT Occupational Therapist               Outcome Measures     Row Name 06/01/22 1305          How much help from another is currently needed...    Putting on and taking off regular lower body clothing? 2  -SD     Bathing (including washing, rinsing, and drying) 2  -SD     Toileting (which includes using toilet bed pan or urinal) 2  -SD     Putting on and taking off regular upper body clothing 3  -SD     Taking care of personal grooming (such as brushing teeth) 3  -SD     Eating meals 3  -SD     AM-PAC 6 Clicks Score (OT) 15  -SD     Row Name 06/01/22 1305          Functional Assessment    Outcome Measure Options AM-PAC 6 Clicks Daily Activity (OT)  -SD           User Key  (r) = Recorded By, (t) = Taken By, (c) = Cosigned By    Initials Name Provider Type    Jenifer Castle OT Occupational Therapist                 Occupational Therapy Education                 Title: PT OT SLP Therapies (In Progress)     Topic: Occupational Therapy (Done)     Point: ADL training (Done)     Description:   Instruct learner(s) on proper safety adaptation and remediation techniques during self care or transfers.   Instruct in proper use of assistive devices.              Learning Progress Summary           Patient Acceptance, E,TB, VU by SD at 6/1/2022 1306    Comment: Benefit of OT; OT POC                   Point: Home exercise program (Done)     Description:   Instruct learner(s) on appropriate technique for monitoring, assisting and/or progressing therapeutic exercises/activities.              Learning Progress Summary           Patient Acceptance, E,TB, VU by SD at 6/1/2022 1306    Comment: Benefit of OT; OT POC                   Point: Precautions (Done)     Description:   Instruct learner(s) on prescribed precautions during self-care and functional transfers.              Learning Progress Summary           Patient Acceptance, E,TB, VU by SD at 6/1/2022 1306    Comment: Benefit of OT; OT POC                   Point: Body mechanics (Done)     Description:   Instruct learner(s) on proper positioning and spine alignment during self-care, functional mobility activities and/or exercises.              Learning Progress Summary           Patient Acceptance, E,TB, VU by SD at 6/1/2022 1306    Comment: Benefit of OT; OT POC                               User Key     Initials Effective Dates Name Provider Type Discipline    SD 06/16/21 -  Jenifer Natarajan OT Occupational Therapist OT              OT Recommendation and Plan  Planned Therapy Interventions (OT): activity tolerance training, adaptive equipment training, BADL retraining, patient/caregiver education/training, ROM/therapeutic exercise, strengthening exercise, transfer/mobility retraining  Therapy Frequency (OT): 3 times/wk (5 times if indicated)  Plan of Care Review  Plan of  Care Reviewed With: patient  Progress: no change  Outcome Evaluation: OT eval completed. Patient presents deficits in strength, endurance, balance, mobility and ADL performance. Patient completed rolling left to right with min A for perineal hygiene and brief change, requiring max A. Patient desatted to 88% when laying flat. Patient placed in fowlers and completed UBB and UBD with min A, requires max A for LB self care. Patient completed BUE AROM 5 reps, 1 set. Patient reported feeling very fatigued with this amount of activity. O2 ranged 96, 88, 97 before, during, after activity. Patient is expected to benefit from continued OT services prior to DC to address generalized weakness and deconditioning. Patient may require STR.     Time Calculation:    Time Calculation- OT     Row Name 06/01/22 1308             Time Calculation- OT    OT Start Time 1115  -SD      OT Received On 06/01/22  -SD      OT Goal Re-Cert Due Date 06/13/22  -SD              Untimed Charges    OT Eval/Re-eval Minutes 45  -SD              Total Minutes    Untimed Charges Total Minutes 45  -SD       Total Minutes 45  -SD            User Key  (r) = Recorded By, (t) = Taken By, (c) = Cosigned By    Initials Name Provider Type    Jenifer Castle OT Occupational Therapist              Therapy Charges for Today     Code Description Service Date Service Provider Modifiers Qty    [id number]  OT EVAL LOW COMPLEXITY 3 6/1/2022 Jenifer Natarajan OT GO 1               Jenifer Natarajan OT  6/1/2022

## 2022-06-01 NOTE — PROGRESS NOTES
[facility]    Name:  Gene Mobley   Age:  89 y.o.  Sex:  male  :  3/14/1933  MRN:  [id number]   Visit Number:  [id number]  Admission Date:  2022  Date Of Service:  22  Primary Care Physician:  Provider, No Known     LOS: 7 days :    Chief Complaint:      Shortness of breath.    Subjective:    Mr. Mobley was seen and examined today.  Patient laying in bed comfortably with mild tachypnea and cough.  Patient denies any chest pain. He is still on 15 L of high flow nasal cannula oxygen and requires nonrebreather mask.  Patient reports that his respiratory status has been the same today.  Patient reports coughing on when he takes deep breaths.  Patient denies any other acute complaints at this time.  Vitals are stable and afebrile.    Hospital Course:    Mr. Mobley is an 89-year-old male with history of hypertension, diabetes mellitus type 2 was admitted from the emergency room with cough, shortness of breath for 2 weeks.  Patient apparently was diagnosed with COVID-19 approximately 10 days prior to admission by his primary care provider and was treated with antibiotics therapy and steroids.  In the emergency room, his initial temperature was 99.1, pulse 69, blood pressure 171/69 and pulse oxygen saturation of 92% on room air.  His pulse oxygen saturation subsequently decreased into the low 80s and he was placed on 5 L of nasal cannula oxygen.  Blood work done in the emergency room revealed a creatinine of 1.49 (baseline) and hemoglobin of 12.  Patient received IV fluids and was treated symptomatically in the emergency room.  He was not continued on antibiotics therapy and was not placed on steroids.  His home medications for blood pressure were restarted.  Patient's FiO2 requirements did bump up and he had to be placed on 15 L nasal cannula oxygen the next day.  He subsequently slowly improved with a decrease in his FiO2 requirements.  He was noted to have  elevated blood sugars and his Levemir dose was adjusted accordingly.  He was started on physical and occupational therapy. The patient is unfortunately not vaccinated against COVID-19 previously.    Unfortunately, patient's oxygen requirements continued to worsen through the hospital stay and he was started on IV dexamethasone.  His procalcitonin remain negative and he was not initiated on any antibiotics.  Due to steroid therapy, his blood sugars did worsen and his Levemir dose was uptitrated accordingly.    Review of Systems:     All systems were reviewed and negative except as mentioned in subjective, assessment and plan.    Vital Signs:    Temp:  [97.7 °F (36.5 °C)-98.5 °F (36.9 °C)] 98.3 °F (36.8 °C)  Heart Rate:  [62-78] 78  Resp:  [20-24] 22  BP: (148-164)/(72-91) 159/91    Intake and output:    I/O last 3 completed shifts:  In: 840 [P.O.:840]  Out: 2125 [Urine:2125]  I/O this shift:  In: 240 [P.O.:240]  Out: 200 [Urine:200]    Physical Examination:    General Appearance:  Alert and cooperative.  Mild tachypnea on exertion and during conversation.   Head:  Atraumatic and normocephalic.   Eyes: Conjunctivae and sclerae normal, no icterus. No pallor.   Throat: No oral lesions, no thrush, oral mucosa moist.   Neck: Supple, trachea midline, no thyromegaly.   Lungs:   Breath sounds heard bilaterally equally.  No wheezing.  Bilateral scattered crackles heard.  Decreased air movement bilaterally.   Heart:  Normal S1 and S2, no murmur, no gallop, no rub. No JVD.   Abdomen:   Normal bowel sounds, no masses, no organomegaly. Soft, nontender, nondistended, no rebound tenderness.   Extremities: Supple, 1+ pitting ankle edema, no cyanosis, no clubbing.   Skin: No bleeding or rash.   Neurologic: Alert and oriented x 3. No facial asymmetry. Moves all four limbs but does have generalized weakness. No tremors.      Laboratory results:    Results from last 7 days   Lab Units 06/01/22  0543 05/30/22  0602 05/28/22  0639  05/27/22  0635 05/26/22  0621 05/25/22  1850   SODIUM mmol/L 141 139 138   < > 136 134*   POTASSIUM mmol/L 5.3* 4.5 4.6   < > 4.6 4.4   CHLORIDE mmol/L 106 107 103   < > 104 101   CO2 mmol/L 22.2 20.9* 19.6*   < > 21.7* 21.5*   BUN mg/dL 43* 35* 32*   < > 21 24*   CREATININE mg/dL 1.19 1.23 1.32*   < > 1.41* 1.49*   CALCIUM mg/dL 8.1* 7.9* 7.8*   < > 7.8* 8.6   BILIRUBIN mg/dL  --  0.4  --   --  0.3 0.5   ALK PHOS U/L  --  69  --   --  59 69   ALT (SGPT) U/L  --  34  --   --  33 35   AST (SGOT) U/L  --  46*  --   --  41* 43*   GLUCOSE mg/dL 237* 119* 161*   < > 105* 166*    < > = values in this interval not displayed.     Results from last 7 days   Lab Units 06/01/22  0543 05/30/22  0602 05/28/22  0639   WBC 10*3/mm3 13.46* 8.34 11.21*   HEMOGLOBIN g/dL 12.0* 11.3* 10.3*   HEMATOCRIT % 35.4* 33.4* 30.1*   PLATELETS 10*3/mm3 390 346 259         Results from last 7 days   Lab Units 05/25/22  1850   TROPONIN T ng/mL <0.010     Results from last 7 days   Lab Units 05/25/22  1911 05/25/22  1900   BLOODCX  No growth at 5 days No growth at 5 days     Results from last 7 days   Lab Units 05/25/22  1917   PH, ARTERIAL pH units 7.472*   PO2 ART mm Hg 56.0*   PCO2, ARTERIAL mm Hg 31.1*   HCO3 ART mmol/L 22.7     I have reviewed the patient's laboratory results.    Radiology results:    No radiology results from the last 24 hrs    Medication Review:     I have reviewed the patient's active and prn medications.     Problem List:      Acute respiratory failure with hypoxia (HCC)    Pneumonia due to COVID-19 virus    GOMEZ (acute kidney injury) (HCC)    Essential hypertension    Assessment:    1. Acute hypoxic respiratory failure, POA.  2. Bronchitis secondary to COVID-19 infection, POA.  3. Chronic kidney disease stage II.  4. Cholelithiasis noted on CT scan.  5. Essential hypertension.  6. Diabetes mellitus type 2.    Plan:    Respiratory failure/COVID-19.  - Continue high flow oxygen at 15 L. Continue to titrate down as able  to.  - Encouraged to sit up in the chair and use the incentive spirometry.  -  Continue dexamethasone.  -  Continue multivitamins and vitamin C.  -  Patient's procalcitonin is within normal limits and we will hold off on antibiotics therapy.  - CTA chest on 5/25: Viral pneumonia. No pulmonary embolism.  - PT/OT consulted.  - 2d Echo LVEF 65-70%. Grade 1 diastolic dysfunction.  -Chest x-ray repeated this morning and shows worsening bilateral multifocal opacities.  -We will give the patient a dose of Lasix 40mg IV x1  -Dr. Ace have seen the patient today, appreciate his recommendations and assistance.  -A.m. labs and repeat Pro-Camilo.    Chronic kidney disease/hypertension.  - Renal function is at baseline.  - Continue home medications including carvedilol and amlodipine.  - Restart losartan and monitor kidney function.  - Due to elevated systolic blood pressures, carvedilol has been increased to 12.5 mg twice daily.    Diabetes mellitus type 2.  - Blood sugars are stable.  - We will increase Levemir to 20 units twice daily.  - Continue subcutaneous insulin protocol for coverage.  - Hemoglobin A1c 8.4 on 5/26/2022.    Patient's condition is critical and his prognosis is guarded especially with COVID-19 infection and worsening hypoxia.  Further orders as indicated per clinical course.    DVT Prophylaxis: Heparin Subcu Q12  Code Status: DNR/DNI  Diet: Cardiac diabetic  Discharge Plan: Pending- anticipate several more days in the hospital.    Brayden Fischer MD  06/01/22  10:54 EDT    Dictated utilizing Dragon dictation.

## 2022-06-01 NOTE — PLAN OF CARE
Goal Outcome Evaluation:  Plan of Care Reviewed With: patient        Progress: no change  Outcome Evaluation: OT eval completed. Patient presents deficits in strength, endurance, balance, mobility and ADL performance. Patient completed rolling left to right with min A for perineal hygiene and brief change, requiring max A. Patient desatted to 88% when laying flat. Patient placed in fowlers and completed UBB and UBD with min A, requires max A for LB self care. Patient completed BUE AROM 5 reps, 1 set. Patient reported feeling very fatigued with this amount of activity. O2 ranged 96, 88, 97 before, during, after activity. Patient is expected to benefit from continued OT services prior to DC to address generalized weakness and deconditioning. Patient may require STR.

## 2022-06-01 NOTE — CONSULTS
Date of admission:  5/25/2022    Date of consultation:   June 1, 2022    Requested by:   Hospitalist Service.     PCP: Provider, No Known    Reason:  Acute respiratory failure.  ? CoVid 19 infection.    History of Present Illness:  89 y.o. male with past medical history significant for hypertension and diabetes who started complaining of shortness of breath, productive cough. He was treated with antibiotics and steroids by PCP prior to hospitalization after testing positive for Covid 19 virus. He reports a productive cough of white phlegm. He denies any fever or chills. The patient says that his symptoms started after attending a party with several out of town guests.     He previously smoked.     The patient's symptoms continued to worsen and he was admitted to the hospital and pulmonary Consultation was requested for further recommendations.     Review of System: All other review of systems negative except indicated in HPI.  Positive for mild hearing impairment and occasional dizziness.    Past Medical History: Pertinent history reviewed, as appropriate. Negative, except noted below or in HPI.  Past Medical History:   Diagnosis Date   • Diabetes mellitus (HCC)    • Hypertension    • Impaired functional mobility, balance, gait, and endurance          Past Surgical History: Pertinent history reviewed, as appropriate. Negative, except noted below or in HPI.  Past Surgical History:   Procedure Laterality Date   • HERNIA REPAIR           Family History: Pertinent history reviewed, as appropriate. Negative, except noted below or in HPI.  History reviewed. No pertinent family history.      Social History: Pertinent history reviewed, as appropriate. Negative, except noted below or in HPI.  Social History     Socioeconomic History   • Marital status:    Tobacco Use   • Smoking status: Former Smoker   Vaping Use   • Vaping Use: Never used   Substance and Sexual Activity   • Alcohol use: Never   • Drug use: Never  "  • Sexual activity: Defer           Physical Exam:  /73 (BP Location: Right arm, Patient Position: Lying)   Pulse 67   Temp 97.4 °F (36.3 °C) (Axillary)   Resp 22   Ht 175.3 cm (69.02\")   Wt 94.6 kg (208 lb 8.9 oz)   SpO2 96%   BMI 30.78 kg/m²     Constitutional:            Vital signs reviewed                     General: Mild respiratory distress noted.    Eyes:            Extraocular movement was intact.            Pupils appeared equal            Conjunctiva: Pink    ENT:             Hearing was intact              No nasal erythema noted.              Oropharynx was somewhat dry. No lesions noted.     Neck:             Supple.  No obvious JVD noted.              Thyroid gland did not seem to be enlarged    Cardiovascular:              S1 + S2.  Appears regular at this time.    Lungs/Respiratory:            Respiratory effort was minimally labored            Decreased Air Entry Bilaterally with scattered crackles heard.     Abdomen/GI:            Distended but soft.  Bowel sounds were positive.  No obvious organomegaly.    Musculoskeletal/Extremities:            No significant edema noted.            No cyanosis noted            No clubbing noted            Gait could not be assessed at this time.    Neurologic:             Awake, alert and oriented x 3.             Able to follow simple commands.    Psychiatric:             Affect appeared somewhat sluggish             Awake, alert and oriented x 3.    Skin:             No obvious rash noted.             Warm and dry      Labs: Reviewed. Pertinent labs were noted.   Results from last 7 days   Lab Units 06/01/22  0543 05/30/22  0602 05/28/22  0639 05/26/22  0621 05/25/22  1850   WBC 10*3/mm3 13.46* 8.34 11.21* 9.65 8.63   HEMOGLOBIN g/dL 12.0* 11.3* 10.3* 11.3* 12.2*   HEMATOCRIT % 35.4* 33.4* 30.1* 33.7* 36.7*   PLATELETS 10*3/mm3 390 346 259 227 249   NEUTROPHIL % % 78.4*  --   --  66.2 54.2   NEUTROS ABS 10*3/mm3 10.54*  --   --  6.39 4.68 "   EOSINOPHIL % % 0.0*  --   --  0.0* 0.0*   EOS ABS 10*3/mm3 0.00  --   --  0.00 0.00   LYMPHOCYTE % % 8.1*  --   --  19.8 27.9   LYMPHS ABS 10*3/mm3 1.09  --   --  1.91 2.41             Lab Results   Component Value Date    PROCALCITO 0.15 05/28/2022    PROCALCITO 0.14 05/25/2022       Lab Results   Component Value Date    CRP 13.60 (H) 05/30/2022    CRP 17.36 (H) 05/25/2022       No results found for: SEDRATE    Lab Results   Component Value Date    PROBNP 194.2 05/25/2022       Results from last 7 days   Lab Units 06/01/22  0543 05/30/22  0602 05/28/22  0639 05/27/22  0635 05/26/22  0621 05/25/22  1850   SODIUM mmol/L 141 139 138   < > 136 134*   POTASSIUM mmol/L 5.3* 4.5 4.6   < > 4.6 4.4   CHLORIDE mmol/L 106 107 103   < > 104 101   CO2 mmol/L 22.2 20.9* 19.6*   < > 21.7* 21.5*   BUN mg/dL 43* 35* 32*   < > 21 24*   CREATININE mg/dL 1.19 1.23 1.32*   < > 1.41* 1.49*   CALCIUM mg/dL 8.1* 7.9* 7.8*   < > 7.8* 8.6   ANION GAP mmol/L 12.8 11.1 15.4*   < > 10.3 11.5   BILIRUBIN mg/dL  --  0.4  --   --  0.3 0.5   ALK PHOS U/L  --  69  --   --  59 69   ALT (SGPT) U/L  --  34  --   --  33 35   AST (SGOT) U/L  --  46*  --   --  41* 43*   GLUCOSE mg/dL 237* 119* 161*   < > 105* 166*   TOTAL PROTEIN g/dL  --  6.1  --   --  6.0 7.0   ALBUMIN g/dL  --  2.70*  --   --  3.00* 3.00*    < > = values in this interval not displayed.             No results found for: TSH    No results found for: FREET4    No results found for: INR      Micro: As of June 1, 2022   No results found for: RESPCX  Lab Results   Component Value Date    BLOODCX No growth at 5 days 05/25/2022    BLOODCX No growth at 5 days 05/25/2022     No results found for: URINECX  No results found for: MRSACX  No results found for: MRSAPCR  No results found for: URCX  No components found for: LOWRESPCF  No results found for: THROATCX  No results found for: CULTURES  No components found for: STREPBCX  No results found for: STREPPNEUAG  No results found for:  LEGIONELLA  No results found for: MYCOPLASCX  No results found for: GCCX  No results found for: WOUNDCX  No results found for: BODYFLDCX    No results found for: FLU    No results found for: ADENOVIRUS  No results found for: SE452S  No results found for: CVHKU1  No results found for: CVNL63  No results found for: CVOC43  No results found for: HUMETPNEVS  No results found for: HURVEV  No results found for: FLUBPCR  No results found for: PARAINFLUE  No results found for: PARAFLUV2  No results found for: PARAFLUV3  No results found for: PARAFLUV4  No results found for: BPERTPCR  No results found for: [id number]  No results found for: CPNEUPCR  No results found for: MPNEUMO  No results found for: FLUAPCR  No results found for: FLUAH3  No results found for: FLUAH1  No results found for: RSV  No results found for: BPARAPCR    COVID 19:  Lab Results   Component Value Date    COVID19 Detected (C) 05/25/2022           No results found for: THCURSCR  No results found for: PCPUR  No results found for: COCAINEUR  No results found for: METAMPSCNUR  No results found for: LABOPIASCN  No results found for: AMPHETSCREEN  No results found for: LABBENZSCN  No results found for: TRICYCLICSCN  No results found for: LABMETHSCN  No results found for: BARBITSCNUR  No results found for: OXYCODONESCN  No results found for: PROPOXSCN  No results found for: BUPRENORSCNU  No results found for: ETHANOLMGDL  No results found for: ETOHPCT    ABG:  Reviewed  Results from last 7 days   Lab Units 05/25/22 1917   PH, ARTERIAL pH units 7.472*   PCO2, ARTERIAL mm Hg 31.1*   PO2 ART mm Hg 56.0*   O2 SATURATION ART % 91.2*   CARBOXYHEMOGLOBIN % 1.2         Imaging Study: Latest imaging studies was reviewed personally.   Imaging Results (Last 72 Hours)     Procedure Component Value Units Date/Time    XR Chest 1 View [[id number]] Resulted: 06/01/22 0519     Updated: 06/01/22 0519          ECHO:  Results for orders placed during the hospital encounter of  05/25/22    Adult Transthoracic Echo Complete W/ Cont if Necessary Per Protocol    Interpretation Summary  1.  Normal left ventricular size and systolic function, LVEF 65-70%.  2.  Mild concentric LVH.  3.  Grade 1 diastolic dysfunction.  4.  Normal right ventricular size and systolic function.  5.  Mildly increased left atrial volume index.  6.  Mild calcification of aortic valve without significant stenosis.               Assessment:  1.  Acute respiratory failure.   2.  CoVid 19 infection.   Lab Results   Component Value Date    COVID19 Detected (C) 05/25/2022   3.  Hyperglycemia      Discussion/Recommendations:   Based on the patient's presentation it does appear that he has CoVid 19.    The patient is unfortunately not vaccinated against COVID-19 which is probably the reason of worsening respiratory/clinical status.    Nonetheless his initial symptoms appear to have started on 15 May 2022.    Some of his symptoms are likely consistent with pneumonia/pneumonitis, which could be viral in nature.    The CoVid 19 test was positive, at the time of admission.    Patient was advised to use rescue inhaler for when necessary purposes    We will start the patient on Symbicort twice a day.    Although the patient's CRP was elevated, given the fact that he has been admitted for more than 5 days, it is unlikely that he will benefit from Actemra.    We will continue oxygen supplementation.  I informed the patient that as long as his O2 saturation remains above 88-90% or so, we will use nasal cannula +/- nonrebreather mask.  I told him that if he starts needing more oxygen or becomes tachypneic or has refractory hypoxemia, then options will be BiPAP versus intubation.    The patient does not want to have resuscitation performed but stayed silent upon questioning regarding the possibility of BiPAP    he may benefit from attempting to stay prone, as much as possible, as that will help with alveolar recruitment and will minimize  atelectasis.  The patient was asked to lay on both sides, alternately, if he is not able to lay prone.    he will also be asked to use pro-air inhaler on a as needed basis.     There has been information with regards to steroids having mortality benefit especially in patients requiring high amounts of oxygen or who are critically ill.  The patient will be continued on it with adjustment of the dose, as clinically indicated.     We will use IV fluids if dehydration is a concern.  It is however recommended to keep this patient's fluid status euvolemic or even negative balance.    I will repeat portable chest x-ray as indicated.     CRP, ESR and procalcitonin level will be repeated as indicated.    I will also repeat CBC as indicated.     This document was electronically signed by Werner Ace MD on 06/01/22 at 12:36 EDT      Dictated utilizing Dragon dictation.

## 2022-06-02 NOTE — PLAN OF CARE
Goal Outcome Evaluation:  Plan of Care Reviewed With: patient        Progress: improving  Outcome Evaluation: Pt recieved supine in bed and willing to participate with treatment.  Pt performed long sit x 3 and supine bridge in hooklying  x 2 to assist with bedding change. Pt reported prior to treatment 8/10 Shelli and 10/10 post treatment.  Pt required the use of NRB to maintain O2 saturations with activity at or above 88%.  See flowsheet for details

## 2022-06-02 NOTE — PLAN OF CARE
Goal Outcome Evaluation:           Progress: improving  Outcome Evaluation: Patient alert and oriented x4.  Oxygenation maintained on 15L O2 via humidified, high flow nasal cannula with 15L O2 via nonrebreather mask.  No complaints during shift.  Continued cardiac monitoring as ordered.  Noted intermittent bradycardia while sleeping.  Intake and output monitored and documented.  Accu-checks as ordered.  Isolation maintained per protocol.  No acute events noted during shift.  Will continue to monitor patient.

## 2022-06-02 NOTE — THERAPY TREATMENT NOTE
Patient Name: Gene Mobley  : 3/14/1933    MRN: [id number]                              Today's Date: 2022       Admit Date: 2022    Visit Dx:     ICD-10-CM ICD-9-CM   1. Pneumonia due to COVID-19 virus  U07.1 480.8    J12.82 079.89   2. Hypoxia  R09.02 799.02   3. GOMEZ (acute kidney injury) (HCC)  N17.9 584.9     Patient Active Problem List   Diagnosis   • Pneumonia due to COVID-19 virus   • GOMEZ (acute kidney injury) (HCC)   • Acute respiratory failure with hypoxia (HCC)   • Essential hypertension     Past Medical History:   Diagnosis Date   • Diabetes mellitus (HCC)    • Hypertension    • Impaired functional mobility, balance, gait, and endurance      Past Surgical History:   Procedure Laterality Date   • HERNIA REPAIR        General Information     Row Name 22 1525          OT Time and Intention    Document Type therapy note (daily note)  -SD     Mode of Treatment co-treatment;physical therapy;occupational therapy  -SD     Row Name 22 1525          General Information    Patient Profile Reviewed yes  -SD           User Key  (r) = Recorded By, (t) = Taken By, (c) = Cosigned By    Initials Name Provider Type    SD Jenifer Natarajan OT Occupational Therapist                 Mobility/ADL's     Row Name 22 1525          Bed Mobility    Bed Mobility rolling left;rolling right;scooting/bridging;other (see comments)  long sitting in bed x3  -SD     Rolling Left [place] (Bed Mobility) contact guard;verbal cues  -SD     Rolling Right [place] (Bed Mobility) contact guard;verbal cues  -SD     Scooting/Bridging [place] (Bed Mobility) minimum assist (75% patient effort)  -SD     Assistive Device (Bed Mobility) bed rails;draw sheet;head of bed elevated  -SD     Comment, (Bed Mobility) long sit in bed x 3 with min A x 2 in order to change bedding, gown and wipe down back  -SD     Row Name 22 1525          Bathing Assessment/Intervention    [place] Level (Bathing)  chest/trunk;minimum assist (75% patient effort)  -SD     Position (Bathing) long sitting  -SD     Row Name 06/02/22 1525          Upper Body Dressing Assessment/Training    [place] Level (Upper Body Dressing) don;minimum assist (75% patient effort)  -SD     Position (Upper Body Dressing) sitting up in bed  -SD     Row Name 06/02/22 1525          Grooming Assessment/Training    [place] Level (Grooming) hair care, combing/brushing;wash face, hands;minimum assist (75% patient effort)  -SD     Position (Grooming) sitting up in bed  -SD     Row Name 06/02/22 1525          Toileting Assessment/Training    [place] Level (Toileting) adjust/manage clothing;change pad/brief;perform perineal hygiene;maximum assist (25% patient effort)  -SD     Comment, (Toileting) urinary incontinence  -SD           User Key  (r) = Recorded By, (t) = Taken By, (c) = Cosigned By    Initials Name Provider Type    Jenifer Castle, OT Occupational Therapist               Obj/Interventions    No documentation.                Goals/Plan    No documentation.                Clinical Impression     Row Name 06/02/22 1528          Pain Assessment    Pretreatment Pain Rating 0/10 - no pain  -SD     Posttreatment Pain Rating 0/10 - no pain  -SD     Row Name 06/02/22 1528          Plan of Care Review    Plan of Care Reviewed With patient  -SD     Progress improving  -SD     Outcome Evaluation OT tx completed. Patient supine in bed, agreed to participate. Patient reported fatigue 8/10 initially, satting 93 on 15L HF NC. Patient educated on breathing techniques prior to activity. Patient completed rolling left to right, bridging and long sitting in bed in order to change bedding, gown and brief d/t urinary incontinence. Patient completed activity with NRB in addition to NC and desatted to 88, reporting fatigue 10/10. patient placed in fowlers and once O2 stable removed NRB and patient had returned to 93%. Continue OT POC  -SD     Row Name  06/02/22 1528          Vital Signs    Pre SpO2 (%) 93  -SD     O2 Delivery Pre Treatment hi-flow  -SD     Intra SpO2 (%) 88  -SD     O2 Delivery Intra Treatment non-rebreather  -SD     Post SpO2 (%) 93  -SD     O2 Delivery Post Treatment hi-flow  -SD     Row Name 06/02/22 1528          Positioning and Restraints    Pre-Treatment Position in bed  -SD     Post Treatment Position bed  -SD     In Bed fowlers;call light within reach;encouraged to call for assist;exit alarm on  -SD           User Key  (r) = Recorded By, (t) = Taken By, (c) = Cosigned By    Initials Name Provider Type    Jenifer Castle OT Occupational Therapist               Outcome Measures     Row Name 06/02/22 1532          How much help from another is currently needed...    Putting on and taking off regular lower body clothing? 2  -SD     Bathing (including washing, rinsing, and drying) 2  -SD     Toileting (which includes using toilet bed pan or urinal) 2  -SD     Putting on and taking off regular upper body clothing 3  -SD     Taking care of personal grooming (such as brushing teeth) 3  -SD     Eating meals 3  -SD     AM-PAC 6 Clicks Score (OT) 15  -SD     Row Name 06/02/22 1532          Functional Assessment    Outcome Measure Options AM-PAC 6 Clicks Daily Activity (OT)  -SD           User Key  (r) = Recorded By, (t) = Taken By, (c) = Cosigned By    Initials Name Provider Type    Jenifer Castle OT Occupational Therapist                Occupational Therapy Education                 Title: PT OT SLP Therapies (In Progress)     Topic: Occupational Therapy (Done)     Point: ADL training (Done)     Description:   Instruct learner(s) on proper safety adaptation and remediation techniques during self care or transfers.   Instruct in proper use of assistive devices.              Learning Progress Summary           Patient Acceptance, E,TB, VU by SD at 6/2/2022 1532    Comment: Breathing techniques during functional tasks    Acceptance, E,TB, VU  by SD at 6/1/2022 1306    Comment: Benefit of OT; OT POC                   Point: Home exercise program (Done)     Description:   Instruct learner(s) on appropriate technique for monitoring, assisting and/or progressing therapeutic exercises/activities.              Learning Progress Summary           Patient Acceptance, E,TB, VU by SD at 6/1/2022 1306    Comment: Benefit of OT; OT POC                   Point: Precautions (Done)     Description:   Instruct learner(s) on prescribed precautions during self-care and functional transfers.              Learning Progress Summary           Patient Acceptance, E,TB, VU by SD at 6/1/2022 1306    Comment: Benefit of OT; OT POC                   Point: Body mechanics (Done)     Description:   Instruct learner(s) on proper positioning and spine alignment during self-care, functional mobility activities and/or exercises.              Learning Progress Summary           Patient Acceptance, E,TB, VU by SD at 6/1/2022 1306    Comment: Benefit of OT; OT POC                               User Key     Initials Effective Dates Name Provider Type Discipline    SD 06/16/21 -  Jenifer Natarajan OT Occupational Therapist OT              OT Recommendation and Plan  Planned Therapy Interventions (OT): activity tolerance training, adaptive equipment training, BADL retraining, patient/caregiver education/training, ROM/therapeutic exercise, strengthening exercise, transfer/mobility retraining  Therapy Frequency (OT): 3 times/wk (5 times if indicated)  Plan of Care Review  Plan of Care Reviewed With: patient  Progress: improving  Outcome Evaluation: OT tx completed. Patient supine in bed, agreed to participate. Patient reported fatigue 8/10 initially, satting 93 on 15L HF NC. Patient educated on breathing techniques prior to activity. Patient completed rolling left to right, bridging and long sitting in bed in order to change bedding, gown and brief d/t urinary incontinence. Patient completed  activity with NRB in addition to NC and desatted to 88, reporting fatigue 10/10. patient placed in fowlers and once O2 stable removed NRB and patient had returned to 93%. Continue OT POC     Time Calculation:    Time Calculation- OT     Row Name 06/02/22 1532             Time Calculation- OT    OT Start Time 1348  -SD      OT Stop Time 1416  -SD      OT Time Calculation (min) 28 min  -SD      OT Received On 06/02/22  -SD      OT Goal Re-Cert Due Date 06/13/22  -SD              Timed Charges    26583 - OT Therapeutic Activity Minutes 8  -SD      91836 - OT Self Care/Mgmt Minutes 15  -SD              Total Minutes    Timed Charges Total Minutes 23  -SD       Total Minutes 23  -SD            User Key  (r) = Recorded By, (t) = Taken By, (c) = Cosigned By    Initials Name Provider Type    Jenifer Castle OT Occupational Therapist              Therapy Charges for Today     Code Description Service Date Service Provider Modifiers Qty    86707556651 HC OT EVAL LOW COMPLEXITY 3 6/1/2022 Jenifer Natarajan OT GO 1    68516087531 HC OT THERAPEUTIC ACT EA 15 MIN 6/2/2022 Jenifer Natarajan OT GO 1    58430022725 HC OT SELF CARE/MGMT/TRAIN EA 15 MIN 6/2/2022 Jenifer Natarajan OT GO 1               Jenifer Natarajan OT  6/2/2022

## 2022-06-02 NOTE — CASE MANAGEMENT/SOCIAL WORK
Continued Stay Note   [facility]     Patient Name: Gene Mobley  MRN: [id number]  Today's Date: 6/2/2022    Admit Date: 5/25/2022     Discharge Plan     Row Name 06/02/22 0941       Plan    Plan Pt is on 15 Liters of oxygen .Not approprite to discuss discharge plans at this time               Discharge Codes    No documentation.               Expected Discharge Date and Time     Expected Discharge Date Expected Discharge Time    Aly 3, 2022             Veronique Horn RN

## 2022-06-02 NOTE — PLAN OF CARE
Goal Outcome Evaluation:      PT PROGRESSING, PT SITTING UP IN BED THIS AM, COLOR GOOD, APPETITE RETURNING A LITTLE, OXYGEN STAYED IN LOW TO MID 90'S, PULMONARY SAW PT TODAY,

## 2022-06-02 NOTE — THERAPY TREATMENT NOTE
Patient Name: Gene Mobley  : 3/14/1933    MRN: [id number]                              Today's Date: 2022       Admit Date: 2022    Visit Dx:     ICD-10-CM ICD-9-CM   1. Pneumonia due to COVID-19 virus  U07.1 480.8    J12.82 079.89   2. Hypoxia  R09.02 799.02   3. GOMEZ (acute kidney injury) (HCC)  N17.9 584.9     Patient Active Problem List   Diagnosis   • Pneumonia due to COVID-19 virus   • GOMEZ (acute kidney injury) (HCC)   • Acute respiratory failure with hypoxia (HCC)   • Essential hypertension     Past Medical History:   Diagnosis Date   • Diabetes mellitus (HCC)    • Hypertension    • Impaired functional mobility, balance, gait, and endurance      Past Surgical History:   Procedure Laterality Date   • HERNIA REPAIR        General Information     Row Name 22 1630          Physical Therapy Time and Intention    Document Type therapy note (daily note)  -RM     Mode of Treatment co-treatment;physical therapy;occupational therapy  -RM     Row Name 22 1630          General Information    Patient Profile Reviewed yes  -RM     Existing Precautions/Restrictions fall;oxygen therapy device and L/min  -RM     Row Name 22 1630          Cognition    Orientation Status (Cognition) oriented x 4  -RM     Row Name 22 1630          Safety Issues, Functional Mobility    Safety Issues Affecting Function (Mobility) safety precautions follow-through/compliance;safety precaution awareness  -RM     Impairments Affecting Function (Mobility) balance;endurance/activity tolerance;shortness of breath;strength  -RM           User Key  (r) = Recorded By, (t) = Taken By, (c) = Cosigned By    Initials Name Provider Type    RM Mekhi Caceres, PTA Physical Therapist Assistant               Mobility     Row Name 22 1630          Bed Mobility    Comment, (Bed Mobility) Long sit x 3 with min a x 2 and supine bridge in hooklying x 2 to assist with bedding change  -RM           User Key  (r) = Recorded By,  (t) = Taken By, (c) = Cosigned By    Initials Name Provider Type    Mekhi Garza, HAYLIE Physical Therapist Assistant               Obj/Interventions    No documentation.                Goals/Plan    No documentation.                Clinical Impression     Row Name 06/02/22 1645          Pain    Pretreatment Pain Rating 0/10 - no pain  -RM     Posttreatment Pain Rating 0/10 - no pain  -RM     Row Name 06/02/22 1645          Plan of Care Review    Plan of Care Reviewed With patient  -RM     Progress improving  -RM     Outcome Evaluation Pt recieved supine in bed and willing to participate with treatment.  Pt performed long sit x 3 and supine bridge in hooklying  x 2 to assist with bedding change. Pt reported prior to treatment 8/10 Shelli and 10/10 post treatment.  Pt required the use of NRB to maintain O2 saturations with activity at or above 88%.  See flowsheet for details  -RM     Row Name 06/02/22 1645          Vital Signs    Pre SpO2 (%) 91  -RM     O2 Delivery Pre Treatment nasal cannula  15 L  -RM     Intra SpO2 (%) 88  -RM     O2 Delivery Intra Treatment nasal cannula  15L HFNC and NRBM  -RM     Post SpO2 (%) 93  -RM     O2 Delivery Post Treatment nasal cannula  15L  -RM     Pre Patient Position Supine  -RM     Intra Patient Position Sitting  -RM     Post Patient Position Sitting  -RM     Row Name 06/02/22 1645          Positioning and Restraints    Pre-Treatment Position in bed  -RM     Post Treatment Position bed  -RM     In Bed fowlers;call light within reach;encouraged to call for assist;exit alarm on;notified nsg  -RM           User Key  (r) = Recorded By, (t) = Taken By, (c) = Cosigned By    Initials Name Provider Type    Mekhi Garza, HAYLIE Physical Therapist Assistant               Outcome Measures     Row Name 06/02/22 1655          How much help from another person do you currently need...    Turning from your back to your side while in flat bed without using bedrails? 3  -RM     Moving  from lying on back to sitting on the side of a flat bed without bedrails? 3  -RM     Moving to and from a bed to a chair (including a wheelchair)? 2  -RM     Standing up from a chair using your arms (e.g., wheelchair, bedside chair)? 2  -RM     Climbing 3-5 steps with a railing? 2  -RM     To walk in hospital room? 2  -RM     AM-PAC 6 Clicks Score (PT) 14  -RM     Highest level of mobility 4 --> Transferred to chair/commode  -     Row Name 06/02/22 1655 06/02/22 1532       Functional Assessment    Outcome Measure Options AM-PAC 6 Clicks Basic Mobility (PT)  -RM AM-PAC 6 Clicks Daily Activity (OT)  -SD          User Key  (r) = Recorded By, (t) = Taken By, (c) = Cosigned By    Initials Name Provider Type     Mekhi Caceres, PTA Physical Therapist Assistant    Jenifer Castle, OT Occupational Therapist                             Physical Therapy Education                 Title: PT OT SLP Therapies (In Progress)     Topic: Physical Therapy (In Progress)     Point: Mobility training (Done)     Learning Progress Summary           Patient Acceptance, E,TB,D, VU,NR by  at 6/2/2022 1655    Comment: tolerance of mobility    Acceptance, E,TB,D, VU,NR by  at 5/31/2022 1333    Comment: Pursed lip breathing with activity    Acceptance, E,TB, VU by  at 5/28/2022 1850    Comment: Role of PT and POC                   Point: Home exercise program (Not Started)     Learner Progress:  Not documented in this visit.          Point: Body mechanics (Not Started)     Learner Progress:  Not documented in this visit.          Point: Precautions (Done)     Learning Progress Summary           Patient Acceptance, E,TB, VU by  at 5/30/2022 1436    Comment: importance of not over excerting and maintain O2 levels >90%                               User Key     Initials Effective Dates Name Provider Type Discipline     03/23/22 -  Trinidad Boudreaux, PT Physical Therapist PT    CC 06/16/21 -  Charity Daily PTA Physical Therapist  Assistant PT     06/16/21 -  Mekhi Caceres PTA Physical Therapist Assistant PT              PT Recommendation and Plan     Plan of Care Reviewed With: patient  Progress: improving  Outcome Evaluation: Pt recieved supine in bed and willing to participate with treatment.  Pt performed long sit x 3 and supine bridge in hooklying  x 2 to assist with bedding change. Pt reported prior to treatment 8/10 Shelli and 10/10 post treatment.  Pt required the use of NRB to maintain O2 saturations with activity at or above 88%.  See flowsheet for details     Time Calculation:    PT Charges     Row Name 06/02/22 1737             Time Calculation    Start Time 1344  -RM      Stop Time 1416  -RM      Time Calculation (min) 32 min  -RM      PT Received On 06/02/22  -RM      PT Goal Re-Cert Due Date 06/07/22  -RM              Time Calculation- PT    Total Timed Code Minutes- PT 32 minute(s)  -RM              Timed Charges    91232 - PT Therapeutic Exercise Minutes 17  -RM      99916 - PT Therapeutic Activity Minutes 15  -RM              Total Minutes    Timed Charges Total Minutes 32  -RM       Total Minutes 32  -RM            User Key  (r) = Recorded By, (t) = Taken By, (c) = Cosigned By    Initials Name Provider Type     Mekhi Caceres, HAYLIE Physical Therapist Assistant              Therapy Charges for Today     Code Description Service Date Service Provider Modifiers Qty    25865347405 HC PT THER PROC EA 15 MIN 6/2/2022 Mekhi Caceres, PTA GP 1    90501154615 HC PT THERAPEUTIC ACT EA 15 MIN 6/2/2022 Mekhi Caceres, PTA GP 1          PT G-Codes  Outcome Measure Options: AM-PAC 6 Clicks Basic Mobility (PT)  AM-PAC 6 Clicks Score (PT): 14  AM-PAC 6 Clicks Score (OT): 15    Mekhi Caceres PTA  6/2/2022

## 2022-06-02 NOTE — PROGRESS NOTES
[facility]    Name:  Gene Mobley   Age:  89 y.o.  Sex:  male  :  3/14/1933  MRN:  [id number]   Visit Number:  [id number]  Admission Date:  2022  Date Of Service:  22  Primary Care Physician:  Provider, No Known     LOS: 8 days :    Chief Complaint:      Shortness of breath.    Subjective:    Mr. Mobley was seen and examined today. Patient laying in bed comfortably with mild tachypnea.  Respiratory status is about the same however patient is not requiring facial mask currently and is only on 15 L through the nasal cannula.  Patient reporting doing about the same on his breathing.  Patient was encouraged on prone positioning, participation with physical/occupational therapy and getting up to the chair.  Patient denies any other acute complaints otherwise.  Vitals are stable.    Hospital Course:    Mr. Mobley is an 89-year-old male with history of hypertension, diabetes mellitus type 2 was admitted from the emergency room with cough, shortness of breath for 2 weeks.  Patient apparently was diagnosed with COVID-19 approximately 10 days prior to admission by his primary care provider and was treated with antibiotics therapy and steroids.  In the emergency room, his initial temperature was 99.1, pulse 69, blood pressure 171/69 and pulse oxygen saturation of 92% on room air.  His pulse oxygen saturation subsequently decreased into the low 80s and he was placed on 5 L of nasal cannula oxygen.  Blood work done in the emergency room revealed a creatinine of 1.49 (baseline) and hemoglobin of 12.  Patient received IV fluids and was treated symptomatically in the emergency room.  He was not continued on antibiotics therapy and was not placed on steroids.  His home medications for blood pressure were restarted.  Patient's FiO2 requirements did bump up and he had to be placed on 15 L nasal cannula oxygen the next day.  He subsequently slowly improved with a decrease in his  FiO2 requirements.  He was noted to have elevated blood sugars and his Levemir dose was adjusted accordingly.  He was started on physical and occupational therapy. The patient is unfortunately not vaccinated against COVID-19 previously.    Unfortunately, patient's oxygen requirements continued to worsen through the hospital stay and he was started on IV dexamethasone.  His procalcitonin remain negative and he was not initiated on any antibiotics.  Due to steroid therapy, his blood sugars did worsen and his Levemir dose was uptitrated accordingly.    Review of Systems:     All systems were reviewed and negative except as mentioned in subjective, assessment and plan.    Vital Signs:    Temp:  [97.6 °F (36.4 °C)-98.6 °F (37 °C)] 98.2 °F (36.8 °C)  Heart Rate:  [58-71] 66  Resp:  [16-24] 16  BP: (130-161)/(70-93) 130/70    Intake and output:    I/O last 3 completed shifts:  In: 720 [P.O.:720]  Out: 2850 [Urine:2850]  I/O this shift:  In: 240 [P.O.:240]  Out: 500 [Urine:500]    Physical Examination:    General Appearance:  Alert and cooperative.  Mild tachypnea on exertion and during conversation.   Head:  Atraumatic and normocephalic.   Eyes: Conjunctivae and sclerae normal, no icterus. No pallor.   Throat: No oral lesions, no thrush, oral mucosa moist.   Neck: Supple, trachea midline, no thyromegaly.   Lungs:   Breath sounds heard bilaterally equally.  No wheezing.  Bilateral scattered crackles heard.  Decreased air movement bilaterally.   Heart:  Normal S1 and S2, no murmur, no gallop, no rub. No JVD.   Abdomen:   Normal bowel sounds, no masses, no organomegaly. Soft, nontender, nondistended, no rebound tenderness.   Extremities: Supple, 1+ pitting ankle edema, no cyanosis, no clubbing.   Skin: No bleeding or rash.   Neurologic: Alert and oriented x 3. No facial asymmetry. Moves all four limbs but does have generalized weakness. No tremors.      Laboratory results:    Results from last 7 days   Lab Units  06/02/22  0705 06/01/22  0543 05/30/22  0602   SODIUM mmol/L 138 141 139   POTASSIUM mmol/L 4.8 5.3* 4.5   CHLORIDE mmol/L 103 106 107   CO2 mmol/L 22.4 22.2 20.9*   BUN mg/dL 43* 43* 35*   CREATININE mg/dL 1.18 1.19 1.23   CALCIUM mg/dL 8.1* 8.1* 7.9*   BILIRUBIN mg/dL  --   --  0.4   ALK PHOS U/L  --   --  69   ALT (SGPT) U/L  --   --  34   AST (SGOT) U/L  --   --  46*   GLUCOSE mg/dL 278* 237* 119*     Results from last 7 days   Lab Units 06/02/22  0705 06/01/22 0543 05/30/22  0602   WBC 10*3/mm3 10.47 13.46* 8.34   HEMOGLOBIN g/dL 11.4* 12.0* 11.3*   HEMATOCRIT % 33.2* 35.4* 33.4*   PLATELETS 10*3/mm3 379 390 346                     I have reviewed the patient's laboratory results.    Radiology results:    Adult Transthoracic Echo Complete W/ Cont if Necessary Per Protocol    Result Date: 6/1/2022  1.  Normal left ventricular size and systolic function, LVEF 65-70%. 2.  Mild concentric LVH. 3.  Grade 1 diastolic dysfunction. 4.  Normal right ventricular size and systolic function. 5.  Mildly increased left atrial volume index. 6.  Mild calcification of aortic valve without significant stenosis.      Medication Review:     I have reviewed the patient's active and prn medications.     Problem List:      Acute respiratory failure with hypoxia (HCC)    Pneumonia due to COVID-19 virus    GOMEZ (acute kidney injury) (HCC)    Essential hypertension    Assessment:    1. Acute hypoxic respiratory failure, POA.  2. Bronchitis secondary to COVID-19 infection, POA.  3. Chronic kidney disease stage II.  4. Cholelithiasis noted on CT scan.  5. Essential hypertension.  6. Diabetes mellitus type 2.    Plan:    Respiratory failure/COVID-19.  - Continue oxygen at 15 L nasal cannula. Continue to titrate down as able to.  -  Continue dexamethasone.  -  Continue multivitamins and vitamin C.  -  Patient's procalcitonin within normal limits and we will hold off on antibiotics therapy.  - CTA chest on 5/25: Viral pneumonia. No pulmonary  embolism.  - PT/OT consulted.  - 2d Echo LVEF 65-70%. Grade 1 diastolic dysfunction.  -Chest x-ray repeated this morning and shows worsening bilateral multifocal opacities.  -Will repeat Lasix 40mg IV x1 today.  -Dr. Ace have seen the patient, appreciate his recommendations and assistance.  -encouraged on incentive spirometry, prone positioning, participation with physical/occupational therapy and getting up to the chair.    Chronic kidney disease/hypertension.  - Renal function is at baseline.  - Continue home medications including carvedilol and amlodipine.  - Restarted losartan and monitor kidney function.  - Due to elevated systolic blood pressures, carvedilol has been increased to 12.5 mg twice daily.    Diabetes mellitus type 2.  - Blood sugars are stable.  - We will increase Levemir to 20 units twice daily.  - Continue subcutaneous insulin protocol for coverage.  - Hemoglobin A1c 8.4 on 5/26/2022.    Patient's condition is critical and his prognosis is guarded especially with COVID-19 infection and worsening hypoxia.  Further orders as indicated per clinical course.    DVT Prophylaxis: Heparin Subcu Q12  Code Status: DNR/DNI  Diet: Cardiac diabetic  Discharge Plan: Pending- anticipate several more days in the hospital.    Brayden Fischer MD  06/02/22  12:10 EDT    Dictated utilizing Dragon dictation.

## 2022-06-02 NOTE — PLAN OF CARE
Goal Outcome Evaluation:  Plan of Care Reviewed With: patient        Progress: improving  Outcome Evaluation: OT tx completed. Patient supine in bed, agreed to participate. Patient reported fatigue 8/10 initially, satting 93 on 15L HF NC. Patient educated on breathing techniques prior to activity. Patient completed rolling left to right, bridging and long sitting in bed in order to change bedding, gown and brief d/t urinary incontinence. Patient completed activity with NRB in addition to NC and desatted to 88, reporting fatigue 10/10. patient placed in fowlers and once O2 stable removed NRB and patient had returned to 93%. Continue OT POC

## 2022-06-02 NOTE — PLAN OF CARE
Goal Outcome Evaluation:improved  with oxygen 15 liters high flow, nonrebreather mask removed

## 2022-06-02 NOTE — PROGRESS NOTES
"Adult Nutrition  Assessment/PES    Patient Name:  Gene Mobley  YOB: 1933  MRN: [id number]  Admit Date:  5/25/2022    Assessment Date:  6/2/2022    Comments:      Recommend:    1. Continue current diet as medically appropriate and tolerated.   2. Continue to encourage PO intake as appropriate. Avg of 44% x 8 meal.   3. Boost Glucose Control ordered TID. RD ordered Arginaid TID.  4. Continue MVI with minerals daily.   5. Continue to monitor and replace electrolytes PRN.    RD to follow pt and available PRN.      Reason for Assessment     Row Name 06/02/22 1408          Reason for Assessment    Reason For Assessment per organizational policy;identified at risk by screening criteria;other (see comments)  LOS > 7 days     Diagnosis diabetes diagnosis/complications;cardiac disease;pulmonary disease;infection/sepsis;renal disease;other (see comments)  COVID, HTN, GOMEZ/CKD, DM                  Anthropometrics     Row Name 06/02/22 1419          Anthropometrics    Height 175.3 cm (69.02\")     Weight 94.6 kg (208 lb 8.9 oz)     Age for Calculations 89     Height for Calculation 1.753 m (5' 9.02\")     Weight for Calculation 94.6 kg (208 lb 8.9 oz)  actual bw                Labs/Tests/Procedures/Meds     Row Name 06/02/22 1409          Labs/Procedures/Meds    Lab Results Reviewed reviewed, pertinent     Lab Results Comments high: glu, BUN Low: alb            Medications    Pertinent Medications Reviewed reviewed, pertinent     Pertinent Medications Comments vitamin C, decadron, novolog, levemir, MVI with minerals                Physical Findings     Row Name 06/02/22 1419          Physical Findings    Overall Physical Appearance obesity, on O2 therapy, 9 lb weight loss within the past week (9%), missing teeth                Estimated/Assessed Needs - Anthropometrics     Row Name 06/02/22 1419          Anthropometrics    Height 175.3 cm (69.02\")     Weight 94.6 kg (208 lb 8.9 oz)     Age for Calculations 89     " "Height for Calculation 1.753 m (5' 9.02\")     Weight for Calculation 94.6 kg (208 lb 8.9 oz)  actual bw            Estimated/Assessed Needs    Additional Documentation Fluid Requirements (Group);Burleson-St. Jeor Equation (Group);Estimated Calorie Needs (Group);KCAL/KG (Group);Protein Requirements (Group)            Estimated Calorie Needs    Estimated Calorie Requirement (kcal/day) 2365  25 kcal/kg     Estimated Calorie Need Method kcal/kg            KCAL/KG    KCAL/KG 25 Kcal/Kg (kcal)     25 Kcal/Kg (kcal) 2365            Burleson-St. Jeor Equation    RMR (Burleson-St. Jeor Equation) 1601.6947     Burleson-St. Jeor Activity Factors 1.4 - 1.5     Activity Factors (Burleson-St. Jeor) 2628.0141 - 2402.06152            Protein Requirements    Weight Used For Protein Calculations 94.6 kg (208 lb 8.9 oz)  actual bw     Est Protein Requirement Amount (gms/kg) 1.0 gm protein  95     Estimated Protein Requirements (gms/day) 94.6            Fluid Requirements    Fluid Requirements (mL/day) 2365     Estimated Fluid Requirement Method other (see comments)  1 mL/kcal     RDA Method (mL) 2365                Nutrition Prescription Ordered     Row Name 06/02/22 1421          Nutrition Prescription PO    Current PO Diet Regular     Supplement Boost Glucose Control (Glucerna Shake)     Supplement Frequency 3 times a day     Common Modifiers Cardiac;Consistent Carbohydrate                Evaluation of Received Nutrient/Fluid Intake     Row Name 06/02/22 1419          PO Evaluation    Number of Days PO Intake Evaluated 3 days     Number of Meals 8     % PO Intake 44                     Problem/Interventions:   Problem 1     Row Name 06/02/22 1421          Nutrition Diagnoses Problem 1    Problem 1 Inadequate Nutrient Intake     Etiology (related to) Medical Diagnosis     Pulmonary/Critical Care Other (comment);Acute respiratory failure  COVID     Signs/Symptoms (evidenced by) PO Intake;Unintended Weight Change     Percent (%) intake " recorded 44 %     Over number of meals 8     Unintended Weight Change Loss     Number of Pounds Lost 9     Weight loss time period one week                      Intervention Goal     Row Name 06/02/22 1422          Intervention Goal    General Maintain nutrition;Disease management/therapy;Improved nutrition related lab(s);Reduce/improve symptoms;Meet nutritional needs for age/condition     PO Meet estimated needs;Establish PO;Tolerate PO;Increase intake     Weight Maintain weight                Nutrition Intervention     Row Name 06/02/22 1422          Nutrition Intervention    RD/Tech Action Follow Tx progress;Care plan reviewd;Encourage intake;Recommend/ordered     Recommended/Ordered Supplement                Nutrition Prescription     Row Name 06/02/22 1422          Nutrition Prescription PO    PO Prescription Begin/change supplement;Other (comment)  Continue current diet as medically appropriate and tolerated     Supplement Boost Glucose Control  Arginaid TID     Supplement Frequency 3 times a day     Common Modifiers Cardiac;Consistent Carbohydrate     New PO Prescription Ordered? No, recommended            Other Orders    Obtain Weight Daily     Obtain Weight Ordered? No, recommended     Supplement Vitamin mineral supplement  renal MVI     Supplement Ordered? No, recommended     Labs Hgb A1c     Labs Ordered? No, recommended     Other Continue to monitor and replace electrolytes PRN                Education/Evaluation     Row Name 06/02/22 1423          Education    Education Will Instruct as appropriate            Monitor/Evaluation    Monitor Per protocol;I&O;PO intake;Supplement intake;Pertinent labs;Weight;Symptoms                 Electronically signed by:  Claudia Gonzalez RD  06/02/22 14:23 EDT

## 2022-06-03 NOTE — THERAPY TREATMENT NOTE
Patient Name: Gene Mobley  : 3/14/1933    MRN: [id number]                              Today's Date: 6/3/2022       Admit Date: 2022    Visit Dx:     ICD-10-CM ICD-9-CM   1. Pneumonia due to COVID-19 virus  U07.1 480.8    J12.82 079.89   2. Hypoxia  R09.02 799.02   3. GOMEZ (acute kidney injury) (HCC)  N17.9 584.9     Patient Active Problem List   Diagnosis   • Pneumonia due to COVID-19 virus   • GOMEZ (acute kidney injury) (HCC)   • Acute respiratory failure with hypoxia (HCC)   • Essential hypertension     Past Medical History:   Diagnosis Date   • Diabetes mellitus (HCC)    • Hypertension    • Impaired functional mobility, balance, gait, and endurance      Past Surgical History:   Procedure Laterality Date   • HERNIA REPAIR        General Information     Row Name 22 1557          OT Time and Intention    Document Type therapy note (daily note)  -SD     Mode of Treatment occupational therapy  -SD     Row Name 22 1558          General Information    Patient Profile Reviewed yes  -SD           User Key  (r) = Recorded By, (t) = Taken By, (c) = Cosigned By    Initials Name Provider Type    SD Jenifer Natarajan OT Occupational Therapist                 Mobility/ADL's     Row Name 22 5895          Bed Mobility    Scooting/Bridging [place] (Bed Mobility) standby assist  -SD     Supine-Sit [place] (Bed Mobility) standby assist  -SD     Sit-Supine [place] (Bed Mobility) standby assist  -SD     Assistive Device (Bed Mobility) bed rails;draw sheet;head of bed elevated  -SD     Comment, (Bed Mobility) EOB 12 mins with SBA. NRB added prior to moving to EOB out of caution to prevent desatting. Removed once sitting as sats were good  -SD     Row Name 22 1553          Bathing Assessment/Intervention    [place] Level (Bathing) upper body;upper extremities;chest/trunk;minimum assist (75% patient effort)  -SD     Position (Bathing) edge of bed sitting  -SD     Row Name 22 0641           Upper Body Dressing Assessment/Training    [place] Level (Upper Body Dressing) don;pajama/robe;minimum assist (75% patient effort)  -SD     Position (Upper Body Dressing) edge of bed sitting  -SD     Row Name 06/03/22 1558          Grooming Assessment/Training    [place] Level (Grooming) hair care, combing/brushing;oral care regimen;wash face, hands;standby assist  -SD     Position (Grooming) edge of bed sitting  -SD           User Key  (r) = Recorded By, (t) = Taken By, (c) = Cosigned By    Initials Name Provider Type    Jenifer Castle OT Occupational Therapist               Obj/Interventions    No documentation.                Goals/Plan    No documentation.                Clinical Impression     Row Name 06/03/22 1559          Pain Assessment    Pretreatment Pain Rating 0/10 - no pain  -SD     Posttreatment Pain Rating 0/10 - no pain  -SD     Row Name 06/03/22 1559          Plan of Care Review    Plan of Care Reviewed With patient  -SD     Progress improving  -SD     Outcome Evaluation OT tx completed. Patient supine in bed satting at 95% on 13L HF. Patient willing to participate. NRB added out of caution to prevent desatting with moving to EOB, moved to EOB with SBA and once sats stabilized removed NRB and increased HF to 15L. Patient sat EOB x 12 mins and performed UBB/UBD and simple grooming with SBA-min A.. Patient reported Sehlli scale of 10 and returned to supine and placed in fowlers. O2 dropped to 89% on 15L when repositioning in bed. Patient returned to 94% and HF was decreased back to 13L. Continue OT POC  -SD     Row Name 06/03/22 1557          Vital Signs    Pre SpO2 (%) 95  -SD     O2 Delivery Pre Treatment hi-flow  13L  -SD     Intra SpO2 (%) 89  -SD     O2 Delivery Intra Treatment hi-flow  15L  -SD     Post SpO2 (%) 94  -SD     O2 Delivery Post Treatment hi-flow  13L  -SD     Row Name 06/03/22 1550          Positioning and Restraints    Pre-Treatment Position in bed  -SD      Post Treatment Position bed  -SD     In Bed fowlers;call light within reach;encouraged to call for assist  -SD           User Key  (r) = Recorded By, (t) = Taken By, (c) = Cosigned By    Initials Name Provider Type    Jenifer Castle OT Occupational Therapist               Outcome Measures     Row Name 06/03/22 1603          How much help from another is currently needed...    Putting on and taking off regular lower body clothing? 2  -SD     Bathing (including washing, rinsing, and drying) 2  -SD     Toileting (which includes using toilet bed pan or urinal) 2  -SD     Putting on and taking off regular upper body clothing 3  -SD     Taking care of personal grooming (such as brushing teeth) 3  -SD     Eating meals 3  -SD     AM-PAC 6 Clicks Score (OT) 15  -SD     Row Name 06/03/22 1508          How much help from another person do you currently need...    Turning from your back to your side while in flat bed without using bedrails? 4  -RM     Moving from lying on back to sitting on the side of a flat bed without bedrails? 4  -RM     Moving to and from a bed to a chair (including a wheelchair)? 2  -RM     Standing up from a chair using your arms (e.g., wheelchair, bedside chair)? 2  -RM     Climbing 3-5 steps with a railing? 1  -RM     To walk in hospital room? 2  -RM     AM-PAC 6 Clicks Score (PT) 15  -RM     Highest level of mobility 4 --> Transferred to chair/commode  -RM     Row Name 06/03/22 1603 06/03/22 1508       Functional Assessment    Outcome Measure Options AM-PAC 6 Clicks Daily Activity (OT)  -SD AM-PAC 6 Clicks Basic Mobility (PT)  -RM          User Key  (r) = Recorded By, (t) = Taken By, (c) = Cosigned By    Initials Name Provider Type    Mekhi Garza, PTA Physical Therapist Assistant    Jenifer Castle OT Occupational Therapist                Occupational Therapy Education                 Title: PT OT SLP Therapies (In Progress)     Topic: Occupational Therapy (Done)     Point: ADL  training (Done)     Description:   Instruct learner(s) on proper safety adaptation and remediation techniques during self care or transfers.   Instruct in proper use of assistive devices.              Learning Progress Summary           Patient Acceptance, E,TB, VU by SD at 6/3/2022 1604    Comment: EC during ADLs    Acceptance, E,TB, VU by SD at 6/2/2022 1532    Comment: Breathing techniques during functional tasks    Acceptance, E,TB, VU by SD at 6/1/2022 1306    Comment: Benefit of OT; OT POC                   Point: Home exercise program (Done)     Description:   Instruct learner(s) on appropriate technique for monitoring, assisting and/or progressing therapeutic exercises/activities.              Learning Progress Summary           Patient Acceptance, E,TB, VU by SD at 6/1/2022 1306    Comment: Benefit of OT; OT POC                   Point: Precautions (Done)     Description:   Instruct learner(s) on prescribed precautions during self-care and functional transfers.              Learning Progress Summary           Patient Acceptance, E,TB, VU by SD at 6/1/2022 1306    Comment: Benefit of OT; OT POC                   Point: Body mechanics (Done)     Description:   Instruct learner(s) on proper positioning and spine alignment during self-care, functional mobility activities and/or exercises.              Learning Progress Summary           Patient Acceptance, E,TB, VU by SD at 6/1/2022 1306    Comment: Benefit of OT; OT POC                               User Key     Initials Effective Dates Name Provider Type Discipline    SD 06/16/21 -  Jenifer Natarajan OT Occupational Therapist OT              OT Recommendation and Plan  Planned Therapy Interventions (OT): activity tolerance training, adaptive equipment training, BADL retraining, patient/caregiver education/training, ROM/therapeutic exercise, strengthening exercise, transfer/mobility retraining  Therapy Frequency (OT): 3 times/wk (5 times if indicated)  Plan of  Care Review  Plan of Care Reviewed With: patient  Progress: improving  Outcome Evaluation: OT tx completed. Patient supine in bed satting at 95% on 13L HF. Patient willing to participate. NRB added out of caution to prevent desatting with moving to EOB, moved to EOB with SBA and once sats stabilized removed NRB and increased HF to 15L. Patient sat EOB x 12 mins and performed UBB/UBD and simple grooming with SBA-min A.. Patient reported Shelli scale of 10 and returned to supine and placed in fowlers. O2 dropped to 89% on 15L when repositioning in bed. Patient returned to 94% and HF was decreased back to 13L. Continue OT POC     Time Calculation:    Time Calculation- OT     Row Name 06/03/22 1604             Time Calculation- OT    OT Start Time 1406  -SD      OT Stop Time 1438  -SD      OT Time Calculation (min) 32 min  -SD      OT Received On 06/03/22  -SD      OT Goal Re-Cert Due Date 06/13/22  -SD              Timed Charges    55318 - OT Therapeutic Activity Minutes 15  -SD      65755 - OT Self Care/Mgmt Minutes 17  -SD              Total Minutes    Timed Charges Total Minutes 32  -SD       Total Minutes 32  -SD            User Key  (r) = Recorded By, (t) = Taken By, (c) = Cosigned By    Initials Name Provider Type    SD Jenifer Natarajan OT Occupational Therapist              Therapy Charges for Today     Code Description Service Date Service Provider Modifiers Qty    67551608646 HC OT THERAPEUTIC ACT EA 15 MIN 6/2/2022 Jenifer Natarajan OT GO 1    71745910930 HC OT SELF CARE/MGMT/TRAIN EA 15 MIN 6/2/2022 Jenifer Natarajan OT GO 1    36636223849 HC OT THERAPEUTIC ACT EA 15 MIN 6/3/2022 Jenifer Natarajan OT GO 1    06163328042 HC OT SELF CARE/MGMT/TRAIN EA 15 MIN 6/3/2022 Jenifer Natarajan OT GO 1               Jenifer Natarajan OT  6/3/2022

## 2022-06-03 NOTE — PLAN OF CARE
Goal Outcome Evaluation:  Plan of Care Reviewed With: patient        Progress: improving  Outcome Evaluation: Pt recieved supine in bed and willing to participate with treatment.  Pt was able to transfer to and from EOB with SBA and able to scoot UIC with SBA. Pt tolerated sitting EOB self supported for approx 12 minutes. NRBM was placed on pt but was removed shortly after pt transferring to EOB. Pt was able to maintain O2 saturation at or above 89%. Pt was able to recover to 94% in fowlers position in bed and was titrated back down to 13 LPM per HFNC and maintain 93-94% O2 saturation. See flowsheet for details

## 2022-06-03 NOTE — PLAN OF CARE
Goal Outcome Evaluation:      No acute events this shift. Patient maintaining on 13LHFNC. Worked well with therapy sitting on side of bed. No needs at this time.      Progress: improving

## 2022-06-03 NOTE — PLAN OF CARE
Goal Outcome Evaluation:  Plan of Care Reviewed With: patient        Progress: improving  Outcome Evaluation: no acute events during shift. VSS. 15L HFNC. NSR BBB. will continue to monitor.

## 2022-06-03 NOTE — PLAN OF CARE
Goal Outcome Evaluation:  Plan of Care Reviewed With: patient        Progress: improving  Outcome Evaluation: OT tx completed. Patient supine in bed satting at 95% on 13L HF. Patient willing to participate. NRB added out of caution to prevent desatting with moving to EOB, moved to EOB with SBA and once sats stabilized removed NRB and increased HF to 15L. Patient sat EOB x 12 mins and performed UBB/UBD and simple grooming with SBA-min A.. Patient reported Shelli scale of 10 and returned to supine and placed in fowlers. O2 dropped to 89% on 15L when repositioning in bed. Patient returned to 94% and HF was decreased back to 13L. Continue OT POC

## 2022-06-03 NOTE — THERAPY TREATMENT NOTE
Patient Name: Gene Mobley  : 3/14/1933    MRN: [id number]                              Today's Date: 6/3/2022       Admit Date: 2022    Visit Dx:     ICD-10-CM ICD-9-CM   1. Pneumonia due to COVID-19 virus  U07.1 480.8    J12.82 079.89   2. Hypoxia  R09.02 799.02   3. GOMEZ (acute kidney injury) (HCC)  N17.9 584.9     Patient Active Problem List   Diagnosis   • Pneumonia due to COVID-19 virus   • GOMEZ (acute kidney injury) (HCC)   • Acute respiratory failure with hypoxia (HCC)   • Essential hypertension     Past Medical History:   Diagnosis Date   • Diabetes mellitus (HCC)    • Hypertension    • Impaired functional mobility, balance, gait, and endurance      Past Surgical History:   Procedure Laterality Date   • HERNIA REPAIR        General Information     Row Name 22 145          Physical Therapy Time and Intention    Document Type therapy note (daily note)  -RM     Mode of Treatment co-treatment;physical therapy;occupational therapy  -RM     Row Name 22 145          General Information    Patient Profile Reviewed yes  -RM     Existing Precautions/Restrictions fall;oxygen therapy device and L/min  13-15 L HFNC  -RM     Row Name 22 145          Cognition    Orientation Status (Cognition) oriented x 4  -RM     Row Name 22 145          Safety Issues, Functional Mobility    Impairments Affecting Function (Mobility) balance;endurance/activity tolerance;shortness of breath;strength  -RM           User Key  (r) = Recorded By, (t) = Taken By, (c) = Cosigned By    Initials Name Provider Type    RM Mekhi Caceres, PTA Physical Therapist Assistant               Mobility     Row Name 22 1457          Bed Mobility    Bed Mobility sit-supine  -RM     Scooting/Bridging [place] (Bed Mobility) standby assist  -RM     Supine-Sit [place] (Bed Mobility) standby assist  -RM     Sit-Supine [place] (Bed Mobility) standby assist  -RM     Comment, (Bed Mobility) Sitting EOB approx  12 min self supported.  -RM           User Key  (r) = Recorded By, (t) = Taken By, (c) = Cosigned By    Initials Name Provider Type    Mekhi Garza, PTA Physical Therapist Assistant               Obj/Interventions    No documentation.                Goals/Plan    No documentation.                Clinical Impression     Row Name 06/03/22 1502          Pain    Pretreatment Pain Rating 0/10 - no pain  -RM     Posttreatment Pain Rating 0/10 - no pain  -RM     Row Name 06/03/22 1502          Plan of Care Review    Plan of Care Reviewed With patient  -RM     Progress improving  -RM     Outcome Evaluation Pt recieved supine in bed and willing to participate with treatment.  Pt was able to transfer to and from EOB with SBA and able to scoot UIC with SBA. Pt tolerated sitting EOB self supported for approx 12 minutes. NRBM was placed on pt but was removed shortly after pt transferring to EOB. Pt was able to maintain O2 saturation at or above 89%. Pt was able to recover to 94% in fowlers position in bed and was titrated back down to 13 LPM per HFNC and maintain 93-94% O2 saturation. See flowsheet for details  -RM     Row Name 06/03/22 1502          Vital Signs    Pre SpO2 (%) 95  -RM     O2 Delivery Pre Treatment nasal cannula  13  -RM     Intra SpO2 (%) 89  -RM     O2 Delivery Intra Treatment nasal cannula  15  -RM     Post SpO2 (%) 94  -RM     O2 Delivery Post Treatment nasal cannula  13  -RM     Pre Patient Position Supine  -RM     Intra Patient Position Sitting  -RM     Post Patient Position Sitting  -RM     Row Name 06/03/22 1502          Positioning and Restraints    Pre-Treatment Position in bed  -RM     Post Treatment Position bed  -RM     In Bed fowlers;call light within reach;encouraged to call for assist;notified nsg  -RM           User Key  (r) = Recorded By, (t) = Taken By, (c) = Cosigned By    Initials Name Provider Type    Mekhi Garza, HAYLIE Physical Therapist Assistant               Outcome  Measures     Row Name 06/03/22 1508          How much help from another person do you currently need...    Turning from your back to your side while in flat bed without using bedrails? 4  -RM     Moving from lying on back to sitting on the side of a flat bed without bedrails? 4  -RM     Moving to and from a bed to a chair (including a wheelchair)? 2  -RM     Standing up from a chair using your arms (e.g., wheelchair, bedside chair)? 2  -RM     Climbing 3-5 steps with a railing? 1  -RM     To walk in hospital room? 2  -RM     AM-PAC 6 Clicks Score (PT) 15  -RM     Highest level of mobility 4 --> Transferred to chair/commode  -RM     Row Name 06/03/22 1508          Functional Assessment    Outcome Measure Options AM-PAC 6 Clicks Basic Mobility (PT)  -RM           User Key  (r) = Recorded By, (t) = Taken By, (c) = Cosigned By    Initials Name Provider Type     Mekhi Caceres, PTA Physical Therapist Assistant                             Physical Therapy Education                 Title: PT OT SLP Therapies (In Progress)     Topic: Physical Therapy (In Progress)     Point: Mobility training (Done)     Learning Progress Summary           Patient Acceptance, E,TB,D, VU,NR by  at 6/3/2022 1508    Comment: Pursed lip breathing with activity    Acceptance, E,TB,D, VU,NR by  at 6/2/2022 1655    Comment: tolerance of mobility    Acceptance, E,TB,D, VU,NR by  at 5/31/2022 1333    Comment: Pursed lip breathing with activity    Acceptance, E,TB, VU by  at 5/28/2022 1850    Comment: Role of PT and POC                   Point: Home exercise program (Not Started)     Learner Progress:  Not documented in this visit.          Point: Body mechanics (Not Started)     Learner Progress:  Not documented in this visit.          Point: Precautions (Done)     Learning Progress Summary           Patient Acceptance, E,TB, VU by CC at 5/30/2022 1436    Comment: importance of not over excerting and maintain O2 levels >90%                                User Key     Initials Effective Dates Name Provider Type Discipline    JR 03/23/22 -  [place] Boudreaux, PT Physical Therapist PT    CC 06/16/21 -  Charity Daily, HAYLIE Physical Therapist Assistant PT     06/16/21 -  Mekhi Caceres, HAYLIE Physical Therapist Assistant PT              PT Recommendation and Plan     Plan of Care Reviewed With: patient  Progress: improving  Outcome Evaluation: Pt recieved supine in bed and willing to participate with treatment.  Pt was able to transfer to and from EOB with SBA and able to scoot UIC with SBA. Pt tolerated sitting EOB self supported for approx 12 minutes. NRBM was placed on pt but was removed shortly after pt transferring to EOB. Pt was able to maintain O2 saturation at or above 89%. Pt was able to recover to 94% in fowlers position in bed and was titrated back down to 13 LPM per HFNC and maintain 93-94% O2 saturation. See flowsheet for details     Time Calculation:    PT Charges     Row Name 06/03/22 1509             Time Calculation    Start Time 1405  -RM      Stop Time 1438  -RM      Time Calculation (min) 33 min  -RM      PT Received On 06/03/22  -RM      PT Goal Re-Cert Due Date 06/07/22  -RM              Time Calculation- PT    Total Timed Code Minutes- PT 33 minute(s)  -RM              Timed Charges    91633 - PT Therapeutic Activity Minutes 15  -RM              Total Minutes    Timed Charges Total Minutes 15  -RM       Total Minutes 15  -RM            User Key  (r) = Recorded By, (t) = Taken By, (c) = Cosigned By    Initials Name Provider Type     Mekhi Caceres, PTA Physical Therapist Assistant              Therapy Charges for Today     Code Description Service Date Service Provider Modifiers Qty    15896256080 HC PT THER PROC EA 15 MIN 6/2/2022 Mekhi Caceres, PTA GP 1    23557794540 HC PT THERAPEUTIC ACT EA 15 MIN 6/2/2022 Mekhi Caceres, PTA GP 1    00361389263 HC PT THERAPEUTIC ACT EA 15 MIN 6/3/2022 Mekhi Caceres, PTA GP 1           PT G-Codes  Outcome Measure Options: AM-PAC 6 Clicks Basic Mobility (PT)  AM-PAC 6 Clicks Score (PT): 15  AM-PAC 6 Clicks Score (OT): 15    Mekhi Caceres, PTA  6/3/2022

## 2022-06-03 NOTE — PROGRESS NOTES
[facility]    Name:  Gene Mobley   Age:  89 y.o.  Sex:  male  :  3/14/1933  MRN:  [id number]   Visit Number:  [id number]  Admission Date:  2022  Date Of Service:  22  Primary Care Physician:  Provider, No Known     LOS: 9 days :    Chief Complaint:      Shortness of breath.    Subjective:    Mr. Mobley was seen and examined today. Patient laying in bed comfortably with mild tachypnea.  Patient reports that he still cannot take deep breath and he feels shortness of breath has been worse today compared to yesterday.  Patient however clinically appears to be doing little better and is currently on the nasal cannula and was actually saturating 99% when I walked into the room.  I titrated his oxygen down to 12 L and patient continued to saturate at 94 to 95%.  I communicated with nursing on oxygen titration down if possible.  Patient participating with therapy as able to.  Other vitals are stable and is afebrile.      Hospital Course:    Mr. Mobley is an 89-year-old male with history of hypertension, diabetes mellitus type 2 was admitted from the emergency room with cough, shortness of breath for 2 weeks.  Patient apparently was diagnosed with COVID-19 approximately 10 days prior to admission by his primary care provider and was treated with antibiotics therapy and steroids.  In the emergency room, his initial temperature was 99.1, pulse 69, blood pressure 171/69 and pulse oxygen saturation of 92% on room air.  His pulse oxygen saturation subsequently decreased into the low 80s and he was placed on 5 L of nasal cannula oxygen.  Blood work done in the emergency room revealed a creatinine of 1.49 (baseline) and hemoglobin of 12.  Patient received IV fluids and was treated symptomatically in the emergency room.  He was not continued on antibiotics therapy and was not placed on steroids.  His home medications for blood pressure were restarted.  Patient's FiO2  requirements did bump up and he had to be placed on 15 L nasal cannula oxygen the next day.  He subsequently slowly improved with a decrease in his FiO2 requirements.  He was noted to have elevated blood sugars and his Levemir dose was adjusted accordingly.  He was started on physical and occupational therapy. The patient is unfortunately not vaccinated against COVID-19 previously.    Unfortunately, patient's oxygen requirements continued to worsen through the hospital stay and he was started on IV dexamethasone.  His procalcitonin remain negative and he was not initiated on any antibiotics.  Due to steroid therapy, his blood sugars did worsen and his Levemir dose was uptitrated accordingly.    Review of Systems:     All systems were reviewed and negative except as mentioned in subjective, assessment and plan.    Vital Signs:    Temp:  [97.2 °F (36.2 °C)-98.5 °F (36.9 °C)] 98.5 °F (36.9 °C)  Heart Rate:  [54-75] 75  Resp:  [18-22] 18  BP: (146-158)/(73-84) 148/84    Intake and output:    I/O last 3 completed shifts:  In: 960 [P.O.:960]  Out: 4250 [Urine:4250]  I/O this shift:  In: 480 [P.O.:480]  Out: 750 [Urine:750]    Physical Examination:    General Appearance:  Alert and cooperative.  Mild tachypnea on exertion and during conversation.   Head:  Atraumatic and normocephalic.   Eyes: Conjunctivae and sclerae normal, no icterus. No pallor.   Throat: No oral lesions, no thrush, oral mucosa moist.   Neck: Supple, trachea midline, no thyromegaly.   Lungs:   Breath sounds heard bilaterally equally.  No wheezing.  Bilateral scattered crackles heard.  Decreased air movement bilaterally.   Heart:  Normal S1 and S2, no murmur, no gallop, no rub. No JVD.   Abdomen:   Normal bowel sounds, no masses, no organomegaly. Soft, nontender, nondistended, no rebound tenderness.   Extremities: Supple, 1+ pitting ankle edema, no cyanosis, no clubbing.   Skin: No bleeding or rash.   Neurologic: Alert and oriented x 3. No facial  asymmetry. Moves all four limbs but does have generalized weakness. No tremors.      Laboratory results:    Results from last 7 days   Lab Units 06/02/22  0705 06/01/22  0543 05/30/22  0602   SODIUM mmol/L 138 141 139   POTASSIUM mmol/L 4.8 5.3* 4.5   CHLORIDE mmol/L 103 106 107   CO2 mmol/L 22.4 22.2 20.9*   BUN mg/dL 43* 43* 35*   CREATININE mg/dL 1.18 1.19 1.23   CALCIUM mg/dL 8.1* 8.1* 7.9*   BILIRUBIN mg/dL  --   --  0.4   ALK PHOS U/L  --   --  69   ALT (SGPT) U/L  --   --  34   AST (SGOT) U/L  --   --  46*   GLUCOSE mg/dL 278* 237* 119*     Results from last 7 days   Lab Units 06/02/22  0705 06/01/22  0543 05/30/22  0602   WBC 10*3/mm3 10.47 13.46* 8.34   HEMOGLOBIN g/dL 11.4* 12.0* 11.3*   HEMATOCRIT % 33.2* 35.4* 33.4*   PLATELETS 10*3/mm3 379 390 346                     I have reviewed the patient's laboratory results.    Radiology results:    No radiology results from the last 24 hrs    Medication Review:     I have reviewed the patient's active and prn medications.     Problem List:      Acute respiratory failure with hypoxia (HCC)    Pneumonia due to COVID-19 virus    GOMEZ (acute kidney injury) (HCC)    Essential hypertension    Assessment:    1. Acute hypoxic respiratory failure, POA.  2. Bronchitis secondary to COVID-19 infection, POA.  3. Chronic kidney disease stage II.  4. Cholelithiasis noted on CT scan.  5. Essential hypertension.  6. Diabetes mellitus type 2.    Plan:    Respiratory failure/COVID-19.  - On oxygen at around 12  L nasal cannula currently with saturation in the mid 90s percent. Continue to titrate down as able to.  -  Continue dexamethasone.  -  Continue multivitamins and vitamin C.  -  Patient's procalcitonin within normal limits and we will hold off on antibiotics therapy.  - CTA chest on 5/25: Viral pneumonia. No pulmonary embolism.  - PT/OT consulted.  - 2d Echo LVEF 65-70%. Grade 1 diastolic dysfunction.  -Chest x-ray repeated this morning and shows worsening bilateral  multifocal opacities.  -Will repeat Lasix 40mg IV x1 today.  -Dr. Ace have seen the patient, appreciate his recommendations and assistance.  -encouraged on incentive spirometry, prone positioning, participation with physical/occupational therapy and getting up to the chair.  -We will repeat chest x-ray per clinical course as indicated.    Chronic kidney disease/hypertension.  - Renal function is at baseline.  - Continue home medications including carvedilol and amlodipine.  - Restarted losartan and monitor kidney function.  - Due to elevated systolic blood pressures, carvedilol has been increased to 12.5 mg twice daily.    Diabetes mellitus type 2.  - Blood sugars are stable.  - increased Levemir to 20 units twice daily.  - Continue subcutaneous insulin protocol for coverage.  - Hemoglobin A1c 8.4 on 5/26/2022.    Patient's condition is critical and his prognosis is guarded especially with COVID-19 infection and worsening hypoxia.  Further orders as indicated per clinical course.    DVT Prophylaxis: Heparin Subcu Q12  Code Status: DNR/DNI  Diet: Cardiac diabetic  Discharge Plan: Pending- anticipate several more days in the hospital.    Brayden Fischer MD  06/03/22  15:25 EDT    Dictated utilizing Dragon dictation.

## 2022-06-03 NOTE — THERAPY TREATMENT NOTE
Patient Name: Gene Mobley  : 3/14/1933    MRN: [id number]                              Today's Date: 6/3/2022       Admit Date: 2022    Visit Dx:     ICD-10-CM ICD-9-CM   1. Pneumonia due to COVID-19 virus  U07.1 480.8    J12.82 079.89   2. Hypoxia  R09.02 799.02   3. GOMEZ (acute kidney injury) (HCC)  N17.9 584.9     Patient Active Problem List   Diagnosis   • Pneumonia due to COVID-19 virus   • GOMEZ (acute kidney injury) (HCC)   • Acute respiratory failure with hypoxia (HCC)   • Essential hypertension     Past Medical History:   Diagnosis Date   • Diabetes mellitus (HCC)    • Hypertension    • Impaired functional mobility, balance, gait, and endurance      Past Surgical History:   Procedure Laterality Date   • HERNIA REPAIR        General Information     Row Name 22 145          Physical Therapy Time and Intention    Document Type therapy note (daily note)  -RM     Mode of Treatment co-treatment;physical therapy;occupational therapy  -RM     Row Name 22 145          General Information    Patient Profile Reviewed yes  -RM     Existing Precautions/Restrictions fall;oxygen therapy device and L/min  13-15 L HFNC  -RM     Row Name 22 145          Cognition    Orientation Status (Cognition) oriented x 4  -RM     Row Name 22 145          Safety Issues, Functional Mobility    Impairments Affecting Function (Mobility) balance;endurance/activity tolerance;shortness of breath;strength  -RM           User Key  (r) = Recorded By, (t) = Taken By, (c) = Cosigned By    Initials Name Provider Type    RM Mekhi Caceres, PTA Physical Therapist Assistant               Mobility     Row Name 22 1457          Bed Mobility    Bed Mobility sit-supine  -RM     Scooting/Bridging [place] (Bed Mobility) standby assist  -RM     Supine-Sit [place] (Bed Mobility) standby assist  -RM     Sit-Supine [place] (Bed Mobility) standby assist  -RM     Comment, (Bed Mobility) Sitting EOB approx  12 min self supported.  -RM           User Key  (r) = Recorded By, (t) = Taken By, (c) = Cosigned By    Initials Name Provider Type    Mekhi Garza, PTA Physical Therapist Assistant               Obj/Interventions    No documentation.                Goals/Plan    No documentation.                Clinical Impression     Row Name 06/03/22 1502          Pain    Pretreatment Pain Rating 0/10 - no pain  -RM     Posttreatment Pain Rating 0/10 - no pain  -RM     Row Name 06/03/22 1502          Plan of Care Review    Plan of Care Reviewed With patient  -RM     Progress improving  -RM     Outcome Evaluation Pt recieved supine in bed and willing to participate with treatment.  Pt was able to transfer to and from EOB with SBA and able to scoot UIC with SBA. Pt tolerated sitting EOB self supported for approx 12 minutes. NRBM was placed on pt but was removed shortly after pt transferring to EOB. Pt was able to maintain O2 saturation at or above 89%. Pt was able to recover to 94% in fowlers position in bed and was titrated back down to 13 LPM per HFNC and maintain 93-94% O2 saturation. See flowsheet for details  -RM     Row Name 06/03/22 1502          Vital Signs    Pre SpO2 (%) 95  -RM     O2 Delivery Pre Treatment nasal cannula  13  -RM     Intra SpO2 (%) 89  -RM     O2 Delivery Intra Treatment nasal cannula  15  -RM     Post SpO2 (%) 94  -RM     O2 Delivery Post Treatment nasal cannula  13  -RM     Pre Patient Position Supine  -RM     Intra Patient Position Sitting  -RM     Post Patient Position Sitting  -RM     Row Name 06/03/22 1502          Positioning and Restraints    Pre-Treatment Position in bed  -RM     Post Treatment Position bed  -RM     In Bed fowlers;call light within reach;encouraged to call for assist;notified nsg  -RM           User Key  (r) = Recorded By, (t) = Taken By, (c) = Cosigned By    Initials Name Provider Type    Mekhi Garza, HAYLIE Physical Therapist Assistant               Outcome  Measures     Row Name 06/03/22 1508          How much help from another person do you currently need...    Turning from your back to your side while in flat bed without using bedrails? 4  -RM     Moving from lying on back to sitting on the side of a flat bed without bedrails? 4  -RM     Moving to and from a bed to a chair (including a wheelchair)? 2  -RM     Standing up from a chair using your arms (e.g., wheelchair, bedside chair)? 2  -RM     Climbing 3-5 steps with a railing? 1  -RM     To walk in hospital room? 2  -RM     AM-PAC 6 Clicks Score (PT) 15  -RM     Highest level of mobility 4 --> Transferred to chair/commode  -RM     Row Name 06/03/22 1508          Functional Assessment    Outcome Measure Options AM-PAC 6 Clicks Basic Mobility (PT)  -RM           User Key  (r) = Recorded By, (t) = Taken By, (c) = Cosigned By    Initials Name Provider Type     Mekhi Caceres, PTA Physical Therapist Assistant                             Physical Therapy Education                 Title: PT OT SLP Therapies (In Progress)     Topic: Physical Therapy (In Progress)     Point: Mobility training (Done)     Learning Progress Summary           Patient Acceptance, E,TB,D, VU,NR by  at 6/3/2022 1508    Comment: Pursed lip breathing with activity    Acceptance, E,TB,D, VU,NR by  at 6/2/2022 1655    Comment: tolerance of mobility    Acceptance, E,TB,D, VU,NR by  at 5/31/2022 1333    Comment: Pursed lip breathing with activity    Acceptance, E,TB, VU by  at 5/28/2022 1850    Comment: Role of PT and POC                   Point: Home exercise program (Not Started)     Learner Progress:  Not documented in this visit.          Point: Body mechanics (Not Started)     Learner Progress:  Not documented in this visit.          Point: Precautions (Done)     Learning Progress Summary           Patient Acceptance, E,TB, VU by CC at 5/30/2022 1436    Comment: importance of not over excerting and maintain O2 levels >90%                                User Key     Initials Effective Dates Name Provider Type Discipline    JR 03/23/22 -  Trinidad Boudreaux, PT Physical Therapist PT    CC 06/16/21 -  Charity Daily, HAYLIE Physical Therapist Assistant PT     06/16/21 -  Mekhi Caceres, HAYLIE Physical Therapist Assistant PT              PT Recommendation and Plan     Plan of Care Reviewed With: patient  Progress: improving  Outcome Evaluation: Pt recieved supine in bed and willing to participate with treatment.  Pt was able to transfer to and from EOB with SBA and able to scoot UIC with SBA. Pt tolerated sitting EOB self supported for approx 12 minutes. NRBM was placed on pt but was removed shortly after pt transferring to EOB. Pt was able to maintain O2 saturation at or above 89%. Pt was able to recover to 94% in fowlers position in bed and was titrated back down to 13 LPM per HFNC and maintain 93-94% O2 saturation. See flowsheet for details     Time Calculation:    PT Charges     Row Name 06/03/22 1509             Time Calculation    Start Time 1405  -RM      Stop Time 1438  -RM      Time Calculation (min) 33 min  -RM      PT Received On 06/03/22  -RM      PT Goal Re-Cert Due Date 06/07/22  -RM              Time Calculation- PT    Total Timed Code Minutes- PT 33 minute(s)  -RM              Timed Charges    57901 - PT Therapeutic Activity Minutes 15  -RM              Total Minutes    Timed Charges Total Minutes 15  -RM       Total Minutes 15  -RM            User Key  (r) = Recorded By, (t) = Taken By, (c) = Cosigned By    Initials Name Provider Type     Mekhi Caceres, PTA Physical Therapist Assistant              Therapy Charges for Today     Code Description Service Date Service Provider Modifiers Qty    21100426526 HC PT THER PROC EA 15 MIN 6/2/2022 Mekhi Caceres, PTA GP 1    30930996380 HC PT THERAPEUTIC ACT EA 15 MIN 6/2/2022 Mekhi Caceres, PTA GP 1    03734031390 HC PT THERAPEUTIC ACT EA 15 MIN 6/3/2022 Mekhi Caceres, PTA GP 1           PT G-Codes  Outcome Measure Options: AM-PAC 6 Clicks Basic Mobility (PT)  AM-PAC 6 Clicks Score (PT): 15  AM-PAC 6 Clicks Score (OT): 15    Mekhi Caceres, PTA  6/3/2022

## 2022-06-04 NOTE — PLAN OF CARE
Goal Outcome Evaluation:  Plan of Care Reviewed With: patient        Progress: no change  Outcome Evaluation: pleasant patient with no complaint of pain at this time-medications given per Dr. Fischer's orders-monitor blood sugar with coverag per protocol-monitor labs and continue to monitor patient-patient now using high flow nasal cannula on 13L-non-rebreather at bedside PRN

## 2022-06-04 NOTE — PROGRESS NOTES
[facility]    Name:  Gene Mobley   Age:  89 y.o.  Sex:  male  :  3/14/1933  MRN:  [id number]   Visit Number:  [id number]  Admission Date:  2022  Date Of Service:  22  Primary Care Physician:  Provider, No Known     LOS: 10 days :    Chief Complaint:      Shortness of breath.    Subjective:    Mr. Mobley was seen and examined today. Patient laying in bed comfortably with mild tachypnea.  Patient reports feeling generally weak but denies any worsening shortness of breath today.  I was able to titrate oxygen down to 12 L through nasal cannula and patient remained in the mid 90s percent.  We will continue to titrate down as possible.  Patient denies any other acute complaints at this time.  Discussed the plan again with the patient.  Other vitals are stable and is afebrile.    Hospital Course:    Mr. Mobley is an 89-year-old male with history of hypertension, diabetes mellitus type 2 was admitted from the emergency room with cough, shortness of breath for 2 weeks.  Patient apparently was diagnosed with COVID-19 approximately 10 days prior to admission by his primary care provider and was treated with antibiotics therapy and steroids.  In the emergency room, his initial temperature was 99.1, pulse 69, blood pressure 171/69 and pulse oxygen saturation of 92% on room air.  His pulse oxygen saturation subsequently decreased into the low 80s and he was placed on 5 L of nasal cannula oxygen.  Blood work done in the emergency room revealed a creatinine of 1.49 (baseline) and hemoglobin of 12.  Patient received IV fluids and was treated symptomatically in the emergency room.  He was not continued on antibiotics therapy and was not placed on steroids.  His home medications for blood pressure were restarted.  Patient's FiO2 requirements did bump up and he had to be placed on 15 L nasal cannula oxygen the next day.  He subsequently slowly improved with a decrease in his  FiO2 requirements.  He was noted to have elevated blood sugars and his Levemir dose was adjusted accordingly.  He was started on physical and occupational therapy. The patient is unfortunately not vaccinated against COVID-19 previously.    Unfortunately, patient's oxygen requirements continued to worsen through the hospital stay and he was started on IV dexamethasone.  His procalcitonin remain negative and he was not initiated on any antibiotics.  Due to steroid therapy, his blood sugars did worsen and his Levemir dose was uptitrated accordingly.    Review of Systems:     All systems were reviewed and negative except as mentioned in subjective, assessment and plan.    Vital Signs:    Temp:  [95.3 °F (35.2 °C)-98.5 °F (36.9 °C)] 97.2 °F (36.2 °C)  Heart Rate:  [62-83] 83  Resp:  [18] 18  BP: (146-157)/(64-84) 155/64    Intake and output:    I/O last 3 completed shifts:  In: 840 [P.O.:840]  Out: 3875 [Urine:3875]  I/O this shift:  In: -   Out: 150 [Urine:150]    Physical Examination:    General Appearance:  Alert and cooperative.  Mild tachypnea on exertion and during conversation.   Head:  Atraumatic and normocephalic.   Eyes: Conjunctivae and sclerae normal, no icterus. No pallor.   Throat: No oral lesions, no thrush, oral mucosa moist.   Neck: Supple, trachea midline, no thyromegaly.   Lungs:   Breath sounds heard bilaterally equally.  No wheezing.  Bilateral scattered crackles heard.  Decreased air movement bilaterally.   Heart:  Normal S1 and S2, no murmur, no gallop, no rub. No JVD.   Abdomen:   Normal bowel sounds, no masses, no organomegaly. Soft, nontender, nondistended, no rebound tenderness.   Extremities: Supple, no edema, no cyanosis, no clubbing.   Skin: No bleeding or rash.   Neurologic: Alert and oriented x 3. No facial asymmetry. Moves all four limbs but does have generalized weakness. No tremors.      Laboratory results:    Results from last 7 days   Lab Units 06/02/22  0705 06/01/22  0514  05/30/22  0602   SODIUM mmol/L 138 141 139   POTASSIUM mmol/L 4.8 5.3* 4.5   CHLORIDE mmol/L 103 106 107   CO2 mmol/L 22.4 22.2 20.9*   BUN mg/dL 43* 43* 35*   CREATININE mg/dL 1.18 1.19 1.23   CALCIUM mg/dL 8.1* 8.1* 7.9*   BILIRUBIN mg/dL  --   --  0.4   ALK PHOS U/L  --   --  69   ALT (SGPT) U/L  --   --  34   AST (SGOT) U/L  --   --  46*   GLUCOSE mg/dL 278* 237* 119*     Results from last 7 days   Lab Units 06/02/22  0705 06/01/22  0543 05/30/22  0602   WBC 10*3/mm3 10.47 13.46* 8.34   HEMOGLOBIN g/dL 11.4* 12.0* 11.3*   HEMATOCRIT % 33.2* 35.4* 33.4*   PLATELETS 10*3/mm3 379 390 346                     I have reviewed the patient's laboratory results.    Radiology results:    No radiology results from the last 24 hrs    Medication Review:     I have reviewed the patient's active and prn medications.     Problem List:      Acute respiratory failure with hypoxia (HCC)    Pneumonia due to COVID-19 virus    GOMEZ (acute kidney injury) (HCC)    Essential hypertension    Assessment:    1. Acute hypoxic respiratory failure, POA.  2. Bronchitis secondary to COVID-19 infection, POA.  3. Chronic kidney disease stage II.  4. Cholelithiasis noted on CT scan.  5. Essential hypertension.  6. Diabetes mellitus type 2.    Plan:    Respiratory failure/COVID-19.  - Able to titrate oxygen down to 12 L today. Continue to titrate down as able to.  -  Continue dexamethasone.  -  Continue multivitamins and vitamin C.  -  Patient's procalcitonin within normal limits and we will hold off on antibiotics therapy.  - CTA chest on 5/25: Viral pneumonia. No pulmonary embolism.  - PT/OT consulted.  - 2d Echo LVEF 65-70%. Grade 1 diastolic dysfunction.  -Chest x-ray repeated this morning and shows worsening bilateral multifocal opacities.  -Received Lasix yesterday.   -Dr. Ace have seen the patient, appreciate his recommendations and assistance.  -encouraged on incentive spirometry, prone positioning, participation with physical/occupational  therapy and getting up to the chair.  -Recheck labs in a.m.     Chronic kidney disease/hypertension.  - Renal function is at baseline.  - Continue home medications including carvedilol and amlodipine.  - Restarted losartan and monitor kidney function.  - Due to elevated systolic blood pressures, carvedilol has been increased to 12.5 mg twice daily.    Diabetes mellitus type 2.  - Blood sugars are stable.  - increased Levemir to 20 units twice daily.  - Continue subcutaneous insulin protocol for coverage.  - Hemoglobin A1c 8.4 on 5/26/2022.    Patient's condition is critical and his prognosis is guarded especially with COVID-19 infection and worsening hypoxia.  Further orders as indicated per clinical course.    DVT Prophylaxis: Heparin Subcu Q12  Code Status: DNR/DNI  Diet: Cardiac diabetic  Discharge Plan: Pending- anticipate several more days in the hospital.    Brayden Fischer MD  06/04/22  10:23 EDT    Dictated utilizing Dragon dictation.

## 2022-06-04 NOTE — PLAN OF CARE
Goal Outcome Evaluation:           Progress: no change  Outcome Evaluation: Patient on 11L HFNC. Tolerating well but unable to titrate down. Very flat in affect. Sugars remain high but remains on dexamethasone IV.

## 2022-06-05 NOTE — PLAN OF CARE
Goal Outcome Evaluation:  Plan of Care Reviewed With: patient        Progress: improving  Outcome Evaluation: pleasant patient with no complaint of pain at this time-medications given per Dr. Fischer's orders-monitor blood sugar with coverage per protocol-monitor labs and continue to monitor patient-patient encouraged to use incentive spirometer and to work with physicial therapy

## 2022-06-05 NOTE — PLAN OF CARE
Goal Outcome Evaluation:  Plan of Care Reviewed With: patient        Progress: declining   VS noted, patient currently on 15L HF NC and nonrebreather mask oxygen stats in the 90's however when attempting to take nonrebreater mask off to try and titrate down oxygen stats drop in low 80s.

## 2022-06-05 NOTE — PROGRESS NOTES
[facility]    Name:  Gene Mobley   Age:  89 y.o.  Sex:  male  :  3/14/1933  MRN:  [id number]   Visit Number:  [id number]  Admission Date:  2022  Date Of Service:  22  Primary Care Physician:  Provider, No Known     LOS: 11 days :    Chief Complaint:      Shortness of breath.    Subjective:    Mr. Mobley was seen and examined today. Patient laying in bed comfortably with mild tachypnea.  Patient report feeling the same on his respiratory status and still appears generally weak.  Patient still reporting dry cough without any sputum production.  Patient unfortunately had to be titrated up back to the 15 L in addition to the nonrebreather mask.  Patient denies any other acute complaints at this time.  I discussed plan with nursing at room door. Other vitals are stable and is afebrile.    Hospital Course:    Mr. Mobley is an 89-year-old male with history of hypertension, diabetes mellitus type 2 was admitted from the emergency room with cough, shortness of breath for 2 weeks.  Patient apparently was diagnosed with COVID-19 approximately 10 days prior to admission by his primary care provider and was treated with antibiotics therapy and steroids.  In the emergency room, his initial temperature was 99.1, pulse 69, blood pressure 171/69 and pulse oxygen saturation of 92% on room air.  His pulse oxygen saturation subsequently decreased into the low 80s and he was placed on 5 L of nasal cannula oxygen.  Blood work done in the emergency room revealed a creatinine of 1.49 (baseline) and hemoglobin of 12.  Patient received IV fluids and was treated symptomatically in the emergency room.  He was not continued on antibiotics therapy and was not placed on steroids.  His home medications for blood pressure were restarted.  Patient's FiO2 requirements did bump up and he had to be placed on 15 L nasal cannula oxygen the next day.  He subsequently slowly improved with a  decrease in his FiO2 requirements.  He was noted to have elevated blood sugars and his Levemir dose was adjusted accordingly.  He was started on physical and occupational therapy. The patient is unfortunately not vaccinated against COVID-19 previously.    Unfortunately, patient's oxygen requirements continued to worsen through the hospital stay and he was started on IV dexamethasone.  His procalcitonin remain negative and he was not initiated on any antibiotics.  Due to steroid therapy, his blood sugars did worsen and his Levemir dose was uptitrated accordingly.    Review of Systems:     All systems were reviewed and negative except as mentioned in subjective, assessment and plan.    Vital Signs:    Temp:  [96.8 °F (36 °C)-98.6 °F (37 °C)] 96.8 °F (36 °C)  Heart Rate:  [60-74] 70  Resp:  [18-22] 22  BP: (157-165)/(70-85) 165/75    Intake and output:    I/O last 3 completed shifts:  In: 720 [P.O.:720]  Out: 1850 [Urine:1850]  I/O this shift:  In: -   Out: 150 [Urine:150]    Physical Examination:    General Appearance:  Alert and cooperative.  Mild tachypnea on exertion and during conversation.   Head:  Atraumatic and normocephalic.   Eyes: Conjunctivae and sclerae normal, no icterus. No pallor.   Throat: No oral lesions, no thrush, oral mucosa moist.   Neck: Supple, trachea midline, no thyromegaly.   Lungs:   Breath sounds heard bilaterally equally.  No wheezing.  Bilateral scattered crackles heard.  Decreased air movement bilaterally.   Heart:  Normal S1 and S2, no murmur, no gallop, no rub. No JVD.   Abdomen:   Normal bowel sounds, no masses, no organomegaly. Soft, nontender, nondistended, no rebound tenderness.   Extremities: Supple, no edema, no cyanosis, no clubbing.   Skin: No bleeding or rash.   Neurologic: Alert and oriented x 3. No facial asymmetry. Moves all four limbs but does have generalized weakness. No tremors.      Laboratory results:    Results from last 7 days   Lab Units 06/05/22  0656  06/02/22  0705 06/01/22  0543 05/30/22  0602   SODIUM mmol/L 136 138 141 139   POTASSIUM mmol/L 4.6 4.8 5.3* 4.5   CHLORIDE mmol/L 104 103 106 107   CO2 mmol/L 24.1 22.4 22.2 20.9*   BUN mg/dL 44* 43* 43* 35*   CREATININE mg/dL 1.22 1.18 1.19 1.23   CALCIUM mg/dL 8.6 8.1* 8.1* 7.9*   BILIRUBIN mg/dL  --   --   --  0.4   ALK PHOS U/L  --   --   --  69   ALT (SGPT) U/L  --   --   --  34   AST (SGOT) U/L  --   --   --  46*   GLUCOSE mg/dL 214* 278* 237* 119*     Results from last 7 days   Lab Units 06/02/22 0705 06/01/22  0543 05/30/22  0602   WBC 10*3/mm3 10.47 13.46* 8.34   HEMOGLOBIN g/dL 11.4* 12.0* 11.3*   HEMATOCRIT % 33.2* 35.4* 33.4*   PLATELETS 10*3/mm3 379 390 346                     I have reviewed the patient's laboratory results.    Radiology results:    No radiology results from the last 24 hrs    Medication Review:     I have reviewed the patient's active and prn medications.     Problem List:      Acute respiratory failure with hypoxia (HCC)    Pneumonia due to COVID-19 virus    GOMEZ (acute kidney injury) (HCC)    Essential hypertension    Assessment:    1. Acute hypoxic respiratory failure, POA.  2. Bronchitis secondary to COVID-19 infection, POA.  3. Chronic kidney disease stage II.  4. Cholelithiasis noted on CT scan.  5. Essential hypertension.  6. Diabetes mellitus type 2.    Plan:    Respiratory failure/COVID-19.  - Currently on oxygen 15 L today. Continue to titrate down as able to.  -  Continue dexamethasone.  -  Continue multivitamins and vitamin C.  -  Patient's procalcitonin within normal limits and we will hold off on antibiotics therapy.  - CTA chest on 5/25: Viral pneumonia. No pulmonary embolism.  - PT/OT consulted.  - 2d Echo LVEF 65-70%. Grade 1 diastolic dysfunction.  -Chest x-ray repeated this morning and shows worsening bilateral multifocal opacities.  -We will repeat Lasix again today.  -Dr. Ace have seen the patient, appreciate his recommendations and assistance.  -encouraged on  incentive spirometry, prone positioning, participation with physical/occupational therapy and getting up to the chair.    Chronic kidney disease/hypertension.  - Renal function is at baseline.  - Continue home medications including carvedilol and amlodipine.  - Restarted losartan and monitor kidney function.  - Due to elevated systolic blood pressures, carvedilol has been increased to 12.5 mg twice daily.  -On hydralazine as needed    Diabetes mellitus type 2.  - Blood sugars are stable.  - increased Levemir to 20 units twice daily.  - Continue subcutaneous insulin protocol for coverage.  - Hemoglobin A1c 8.4 on 5/26/2022.    Patient's condition is critical and his prognosis is guarded especially with COVID-19 infection and worsening hypoxia.  Further orders as indicated per clinical course.    DVT Prophylaxis: Heparin Subcu Q12  Code Status: DNR/DNI  Diet: Cardiac diabetic  Discharge Plan: Pending- anticipate several more days in the hospital.    Brayden Fischer MD  06/05/22  09:22 EDT    Dictated utilizing Dragon dictation.

## 2022-06-06 NOTE — THERAPY TREATMENT NOTE
Per chart review and per nsg pt with increased O2 requirements with HFNC increased to 15 L+15L NRB with O2 SATS dropping in low 80's when  nsg attempting to tirate down. PT to f/u with pt at later date

## 2022-06-06 NOTE — PLAN OF CARE
Goal Outcome Evaluation:  Plan of Care Reviewed With: patient        Progress: improving   Vs noted, patient is on 8L HF NC oxygen stats are currently 96%. However when eating oxygen drops to 86-90% When working with PT oxygen stats dropped PT had to pump patient up to 15L HF NC and place nonrebreather while sitting patient up to side of the bed.

## 2022-06-06 NOTE — PLAN OF CARE
Goal Outcome Evaluation:  Plan of Care Reviewed With: patient        Progress: no change  Outcome Evaluation: OT tx completed. Patient supine in bed satting 93% on 8L HF NC. Patient completed rolling left to right for LB/perineal bathing and depend change. Patient desatted to 89% and was increased from 8L to 10L and eventually 15L. Patient moved to EOB with CGA, completed sit to stand with min A using RW. C/O increased fatigue at which time patient returned to supine and noteable increase of accessory muscle recruitment.  NRB was added for recovery, once sats stabilized patient titrated back down to 8L HF NC. Notified RN patient may benefit from anxiety medicine to aid in proper breathing techniques during activity. Continue OT POC

## 2022-06-06 NOTE — CASE MANAGEMENT/SOCIAL WORK
[facility]  [facility]     Patient Name: Gene Mobley  MRN: [id number]  Today's Date: 6/6/2022    Admit Date: 5/25/2022     Discharge Needs Assessment    No documentation.                Discharge Plan     Row Name 06/06/22 1341       Plan    Plan Comments called paitent spouse  and discussed Rehab  options   states would prefer  [place]   or [place]              Continued Care and Services - Admitted Since 5/25/2022     Destination     Service Provider Request Status Selected Services Address Phone Fax Patient Preferred    [facility]  [facility] - [facility] N[facility] [address] [facility] [facility] [address] [phone] --    [facility]  Pending - Request Sent N/A [address] [place] [address] [phone] --    [facility]  Pending - No Request Sent N/A [address] [address] [phone] --                 Demographic Summary    No documentation.                Functional Status    No documentation.                Psychosocial    No documentation.                Abuse/Neglect    No documentation.                Legal    No documentation.                Substance Abuse    No documentation.                Patient Forms    No documentation.                   Veronique Chan RN

## 2022-06-06 NOTE — THERAPY TREATMENT NOTE
Patient Name: Gene Mobley  : 3/14/1933    MRN: [id number]                              Today's Date: 2022       Admit Date: 2022    Visit Dx:     ICD-10-CM ICD-9-CM   1. Pneumonia due to COVID-19 virus  U07.1 480.8    J12.82 079.89   2. Hypoxia  R09.02 799.02   3. GOMEZ (acute kidney injury) (HCC)  N17.9 584.9     Patient Active Problem List   Diagnosis   • Pneumonia due to COVID-19 virus   • GOMEZ (acute kidney injury) (HCC)   • Acute respiratory failure with hypoxia (HCC)   • Essential hypertension     Past Medical History:   Diagnosis Date   • Diabetes mellitus (HCC)    • Hypertension    • Impaired functional mobility, balance, gait, and endurance      Past Surgical History:   Procedure Laterality Date   • HERNIA REPAIR        General Information     Row Name 22 1509          OT Time and Intention    Document Type therapy note (daily note)  -SD     Mode of Treatment occupational therapy  -SD     Row Name 22 1509          General Information    Patient Profile Reviewed yes  -SD           User Key  (r) = Recorded By, (t) = Taken By, (c) = Cosigned By    Initials Name Provider Type    SD Jenifer Natarajan OT Occupational Therapist                 Mobility/ADL's     Row Name 22 1509          Bed Mobility    Bed Mobility rolling left;rolling right;supine-sit;sit-supine  -SD     Rolling Left [place] (Bed Mobility) standby assist;verbal cues  -SD     Rolling Right [place] (Bed Mobility) standby assist;verbal cues  -SD     Supine-Sit [place] (Bed Mobility) contact guard  -SD     Sit-Supine [place] (Bed Mobility) contact guard  -SD     Assistive Device (Bed Mobility) bed rails;head of bed elevated  -SD     Comment, (Bed Mobility) completed brief change, perineal hygiene in bed  -SD     Row Name 22 1509          Transfers    Transfers sit-stand transfer  -SD     Sit-Stand [place] (Transfers) minimum assist (75% patient effort)  -SD     Row Name 22 1503           Sit-Stand Transfer    Assistive Device (Sit-Stand Transfers) walker, front-wheeled  -SD     Comment, (Sit-Stand Transfer) sit to stand x 1  -SD     Row Name 06/06/22 1509          Bathing Assessment/Intervention    [place] Level (Bathing) distal lower extremities/feet;perineal area;maximum assist (25% patient effort);upper body;upper extremities;chest/trunk;minimum assist (75% patient effort)  -SD     Position (Bathing) edge of bed sitting;supine  -SD     Row Name 06/06/22 1509          Upper Body Dressing Assessment/Training    [place] Level (Upper Body Dressing) don;pajama/robe;minimum assist (75% patient effort)  -SD     Position (Upper Body Dressing) edge of bed sitting  -SD     Row Name 06/06/22 1509          Grooming Assessment/Training    [place] Level (Grooming) hair care, combing/brushing;wash face, hands  -SD     Position (Grooming) edge of bed sitting  -SD           User Key  (r) = Recorded By, (t) = Taken By, (c) = Cosigned By    Initials Name Provider Type    Jenifer Castle OT Occupational Therapist               Obj/Interventions    No documentation.                Goals/Plan    No documentation.                Clinical Impression     Row Name 06/06/22 1513          Pain Assessment    Pretreatment Pain Rating 0/10 - no pain  -SD     Posttreatment Pain Rating 0/10 - no pain  -SD     Row Name 06/06/22 1513          Plan of Care Review    Plan of Care Reviewed With patient  -SD     Progress no change  -SD     Outcome Evaluation OT tx completed. Patient supine in bed satting 93% on 8L HF NC. Patient completed rolling left to right for LB/perineal bathing and depend change. Patient desatted to 89% and was increased from 8L to 10L and eventually 15L. Patient moved to EOB with CGA, completed sit to stand with min A using RW. C/O increased fatigue at which time patient returned to supine and noteable increase of accessory muscle recruitment.  NRB was added for recovery, once sats  stabilized patient titrated back down to 8L HF NC. Notified RN patient may benefit from anxiety medicine to aid in proper breathing techniques during activity. Continue OT POC  -SD     Row Name 06/06/22 1513          Vital Signs    Pre SpO2 (%) 93  -SD     O2 Delivery Pre Treatment hi-flow  8L  -SD     Intra SpO2 (%) 89  -SD     O2 Delivery Intra Treatment non-rebreather  15L  -SD     Post SpO2 (%) 93  -SD     O2 Delivery Post Treatment hi-flow  8L  -SD     Row Name 06/06/22 1513          Positioning and Restraints    Pre-Treatment Position in bed  -SD     Post Treatment Position bed  -SD     In Bed supine;call light within reach;encouraged to call for assist;notified nsg  -SD           User Key  (r) = Recorded By, (t) = Taken By, (c) = Cosigned By    Initials Name Provider Type    Jenifer Castle OT Occupational Therapist               Outcome Measures     Row Name 06/06/22 1519          How much help from another is currently needed...    Putting on and taking off regular lower body clothing? 2  -SD     Bathing (including washing, rinsing, and drying) 2  -SD     Toileting (which includes using toilet bed pan or urinal) 2  -SD     Putting on and taking off regular upper body clothing 3  -SD     Taking care of personal grooming (such as brushing teeth) 3  -SD     Eating meals 3  -SD     AM-PAC 6 Clicks Score (OT) 15  -SD     Row Name 06/06/22 1519          Functional Assessment    Outcome Measure Options AM-PAC 6 Clicks Daily Activity (OT)  -SD           User Key  (r) = Recorded By, (t) = Taken By, (c) = Cosigned By    Initials Name Provider Type    Jenifer Castle OT Occupational Therapist                Occupational Therapy Education                 Title: PT OT SLP Therapies (In Progress)     Topic: Occupational Therapy (Done)     Point: ADL training (Done)     Description:   Instruct learner(s) on proper safety adaptation and remediation techniques during self care or transfers.   Instruct in proper  use of assistive devices.              Learning Progress Summary           Patient Acceptance, E,TB, VU by SD at 6/6/2022 1519    Comment: Safety and sequencing during functional tf      Show all documentation for this point (3)                 Point: Home exercise program (Done)     Description:   Instruct learner(s) on appropriate technique for monitoring, assisting and/or progressing therapeutic exercises/activities.              Learning Progress Summary           Patient Acceptance, E,TB, VU by SD at 6/1/2022 1306    Comment: Benefit of OT; OT POC                   Point: Precautions (Done)     Description:   Instruct learner(s) on prescribed precautions during self-care and functional transfers.              Learning Progress Summary           Patient Acceptance, E,TB, VU by SD at 6/1/2022 1306    Comment: Benefit of OT; OT POC                   Point: Body mechanics (Done)     Description:   Instruct learner(s) on proper positioning and spine alignment during self-care, functional mobility activities and/or exercises.              Learning Progress Summary           Patient Acceptance, E,TB, VU by SD at 6/1/2022 1306    Comment: Benefit of OT; OT POC                               User Key     Initials Effective Dates Name Provider Type Discipline    SD 06/16/21 -  Jenifer Natarajan OT Occupational Therapist OT              OT Recommendation and Plan  Planned Therapy Interventions (OT): activity tolerance training, adaptive equipment training, BADL retraining, patient/caregiver education/training, ROM/therapeutic exercise, strengthening exercise, transfer/mobility retraining  Therapy Frequency (OT): 3 times/wk (5 times if indicated)  Plan of Care Review  Plan of Care Reviewed With: patient  Progress: no change  Outcome Evaluation: OT tx completed. Patient supine in bed satting 93% on 8L HF NC. Patient completed rolling left to right for LB/perineal bathing and depend change. Patient desatted to 89% and was  increased from 8L to 10L and eventually 15L. Patient moved to EOB with CGA, completed sit to stand with min A using RW. C/O increased fatigue at which time patient returned to supine and noteable increase of accessory muscle recruitment.  NRB was added for recovery, once sats stabilized patient titrated back down to 8L HF NC. Notified RN patient may benefit from anxiety medicine to aid in proper breathing techniques during activity. Continue OT POC     Time Calculation:    Time Calculation- OT     Row Name 06/06/22 1519             Time Calculation- OT    OT Start Time 1321  -SD      OT Stop Time 1403  -SD      OT Time Calculation (min) 42 min  -SD      OT Received On 06/06/22  -SD      OT Goal Re-Cert Due Date 06/13/22  -SD              Timed Charges    72092 - OT Self Care/Mgmt Minutes 23  -SD              Total Minutes    Timed Charges Total Minutes 23  -SD       Total Minutes 23  -SD            User Key  (r) = Recorded By, (t) = Taken By, (c) = Cosigned By    Initials Name Provider Type    SD Jenifer Natarajan OT Occupational Therapist              Therapy Charges for Today     Code Description Service Date Service Provider Modifiers Qty    [id number]  OT SELF CARE/MGMT/TRAIN EA 15 MIN 6/6/2022 Jenifer Natarajan OT GO 2               Jenifer Natarajan OT  6/6/2022

## 2022-06-06 NOTE — PLAN OF CARE
Goal Outcome Evaluation:  Plan of Care Reviewed With: patient        Progress: improving  Outcome Evaluation: Pt recieved supine in bed and willing to participate with treatment. Pt with 8 lpm pnc at 93% with mobility in bed pt was noted to have increased difficulty with breathing and was titrated up to 10L subsequently having to be increased to 15L to miantain O2 at or above 90%.  Pt performed sts from EOB with rw with min a and was able to maintain for approx 30 seconds. Pt was returned to EOB with rest period of approx 5 minutes required. Pt performed lateral scoot x 2 min a to return pt to appropriate position in bed.  Pt was able to return to supine with cga.  Pt required NRBM at 15 L as well as HFNC at 15 L for recover. Pt rebounded to at one point 97% and NFNC was slowly titrated back down to 10L and then to 8L. NRBM was removed and pt was able to maintain O2 saturation of 92-93% in semireclined position.  See flowsheet for details

## 2022-06-06 NOTE — PROGRESS NOTES
[facility]    Name:  Gene Mobley   Age:  89 y.o.  Sex:  male  :  3/14/1933  MRN:  [id number]   Visit Number:  [id number]  Admission Date:  2022  Date Of Service:  22  Primary Care Physician:  Provider, No Known     LOS: 12 days :    Chief Complaint:      Shortness of breath.    Subjective:    Mr. Mobley was seen and examined today. Patient laying in bed comfortably with mild tachypnea. Respiratory status with minor improvement today and was able to titrate down to 10 L NC. Patient reports feeling about the same today on his dyspnea and still feels tachypneic and dyspneic on minor exertion.  Patient is afebrile.  Other vitals are stable.  Patient otherwise denies any acute complaints at this time.    Hospital Course:    Mr. Mobley is an 89-year-old male with history of hypertension, diabetes mellitus type 2 was admitted from the emergency room with cough, shortness of breath for 2 weeks.  Patient apparently was diagnosed with COVID-19 approximately 10 days prior to admission by his primary care provider and was treated with antibiotics therapy and steroids.  He was not continued on antibiotics therapy and was not placed on steroids.  His home medications for blood pressure were restarted.  Patient's FiO2 requirements did bump up and he had to be placed on 15 L nasal cannula oxygen the next day. He was started on physical and occupational therapy. The patient is unfortunately not vaccinated against COVID-19 previously.    Unfortunately, patient's oxygen requirements continued to worsen through the hospital stay and he was started on IV dexamethasone.  His procalcitonin remain negative and he was not initiated on any antibiotics.  Due to steroid therapy, his blood sugars did worsen and his Levemir dose was uptitrated accordingly.  Dr. Ace was consulted and following. Patient seems to be improving slowly with addition of Lasix. Currently titrated down to 10 L  through nasal cannula.     Review of Systems:     All systems were reviewed and negative except as mentioned in subjective, assessment and plan.    Vital Signs:    Temp:  [96.8 °F (36 °C)-98.6 °F (37 °C)] 97.2 °F (36.2 °C)  Heart Rate:  [56-72] 58  Resp:  [22] 22  BP: (139-165)/(75-95) 156/80    Intake and output:    I/O last 3 completed shifts:  In: 1200 [P.O.:1200]  Out: 2650 [Urine:2650]  No intake/output data recorded.    Physical Examination:    General Appearance:  Alert and cooperative.  Mild tachypnea on exertion and during conversation.   Head:  Atraumatic and normocephalic.   Eyes: Conjunctivae and sclerae normal, no icterus. No pallor.   Throat: No oral lesions, no thrush, oral mucosa moist.   Neck: Supple, trachea midline, no thyromegaly.   Lungs:   Breath sounds heard bilaterally equally.  No wheezing.  Bilateral scattered crackles heard.  Decreased air movement bilaterally.   Heart:  Normal S1 and S2, no murmur, no gallop, no rub. No JVD.   Abdomen:   Normal bowel sounds, no masses, no organomegaly. Soft, nontender, nondistended, no rebound tenderness.   Extremities: Supple, no edema, no cyanosis, no clubbing.   Skin: No bleeding or rash.   Neurologic: Alert and oriented x 3. No facial asymmetry. Moves all four limbs but does have generalized weakness. No tremors.      Laboratory results:    Results from last 7 days   Lab Units 06/05/22  0634 06/02/22  0705 06/01/22  0543   SODIUM mmol/L 136 138 141   POTASSIUM mmol/L 4.6 4.8 5.3*   CHLORIDE mmol/L 104 103 106   CO2 mmol/L 24.1 22.4 22.2   BUN mg/dL 44* 43* 43*   CREATININE mg/dL 1.22 1.18 1.19   CALCIUM mg/dL 8.6 8.1* 8.1*   GLUCOSE mg/dL 214* 278* 237*     Results from last 7 days   Lab Units 06/02/22  0705 06/01/22  0543   WBC 10*3/mm3 10.47 13.46*   HEMOGLOBIN g/dL 11.4* 12.0*   HEMATOCRIT % 33.2* 35.4*   PLATELETS 10*3/mm3 379 390                     I have reviewed the patient's laboratory results.    Radiology results:    No radiology results  from the last 24 hrs    Medication Review:     I have reviewed the patient's active and prn medications.     Problem List:      Acute respiratory failure with hypoxia (HCC)    Pneumonia due to COVID-19 virus    GOMEZ (acute kidney injury) (HCC)    Essential hypertension    Assessment:    1. Acute hypoxic respiratory failure, POA.  2. Bronchitis secondary to COVID-19 infection, POA.  3. Chronic kidney disease stage II.  4. Cholelithiasis noted on CT scan.  5. Essential hypertension.  6. Diabetes mellitus type 2.    Plan:    Respiratory failure/COVID-19.  - Currently on oxygen 10 L today. Continue to titrate down as able to.  -  Continue dexamethasone (day 9)  -  Continue multivitamins and vitamin C.  -  Patient's procalcitonin within normal limits and we will hold off on antibiotics therapy.  - CTA chest on 5/25: Viral pneumonia. No pulmonary embolism.  - PT/OT consulted.  - 2d Echo LVEF 65-70%. Grade 1 diastolic dysfunction.  -Chest x-ray repeated this morning and shows worsening bilateral multifocal opacities.  -Respiratory status seems to be improving slowly with intermittent Lasix dosing, we will repeat Lasix again today.  -Dr. Ace have seen the patient, appreciate his recommendations and assistance.  -encouraged on incentive spirometry, prone positioning, participation with physical/occupational therapy and getting up to the chair.  -Xanax as needed anxiety    Chronic kidney disease/hypertension.  - Renal function is at baseline.  - Continue home medications including carvedilol and amlodipine.  - Restarted losartan and monitor kidney function.  - Due to elevated systolic blood pressures, carvedilol has been increased to 12.5 mg twice daily.  -On hydralazine as needed.    Diabetes mellitus type 2.  - Blood sugars are stable.  - increased Levemir to 20 units twice daily.  - Continue subcutaneous insulin protocol for coverage.  - Hemoglobin A1c 8.4 on 5/26/2022.    Patient's condition is critical and his prognosis  is guarded especially with COVID-19 infection and worsening hypoxia.  Further orders as indicated per clinical course.    DVT Prophylaxis: Heparin Subcu Q12  Code Status: DNR/DNI  Diet: Cardiac diabetic  Discharge Plan: Pending- anticipate several more days in the hospital.  Patient would benefit from rehab on discharge.    Brayden Fischer MD  06/06/22  08:30 EDT    Dictated utilizing Dragon dictation.

## 2022-06-06 NOTE — THERAPY TREATMENT NOTE
Patient Name: Gene Mobley  : 3/14/1933    MRN: [id number]                              Today's Date: 2022       Admit Date: 2022    Visit Dx:     ICD-10-CM ICD-9-CM   1. Pneumonia due to COVID-19 virus  U07.1 480.8    J12.82 079.89   2. Hypoxia  R09.02 799.02   3. GOMEZ (acute kidney injury) (HCC)  N17.9 584.9     Patient Active Problem List   Diagnosis   • Pneumonia due to COVID-19 virus   • GOMEZ (acute kidney injury) (HCC)   • Acute respiratory failure with hypoxia (HCC)   • Essential hypertension     Past Medical History:   Diagnosis Date   • Diabetes mellitus (HCC)    • Hypertension    • Impaired functional mobility, balance, gait, and endurance      Past Surgical History:   Procedure Laterality Date   • HERNIA REPAIR        General Information     Row Name 22 1526          Physical Therapy Time and Intention    Document Type therapy note (daily note)  -RM     Mode of Treatment physical therapy  -RM     Row Name 22 1526          General Information    Patient Profile Reviewed yes  -RM     Existing Precautions/Restrictions fall;oxygen therapy device and L/min  -RM     Row Name 22 1526          Cognition    Orientation Status (Cognition) oriented x 4  -RM     Row Name 22 1526          Safety Issues, Functional Mobility    Safety Issues Affecting Function (Mobility) safety precautions follow-through/compliance;safety precaution awareness;sequencing abilities  -RM     Impairments Affecting Function (Mobility) balance;endurance/activity tolerance;shortness of breath;strength  -RM           User Key  (r) = Recorded By, (t) = Taken By, (c) = Cosigned By    Initials Name Provider Type    RM Mekhi Caceres, PTA Physical Therapist Assistant               Mobility     Row Name 22 1527          Bed Mobility    Rolling Left [place] (Bed Mobility) standby assist;verbal cues  -RM     Rolling Right [place] (Bed Mobility) standby assist;verbal cues  -RM     Scooting/Bridging  [place] (Bed Mobility) standby assist  -RM     Supine-Sit [place] (Bed Mobility) standby assist;contact guard;verbal cues  -RM     Sit-Supine [place] (Bed Mobility) standby assist  -RM     Assistive Device (Bed Mobility) bed rails;head of bed elevated  -RM     Comment, (Bed Mobility) maintained long sit for approx 8 minutes  -RM     Row Name 06/06/22 1527          Sit-Stand Transfer    Sit-Stand [place] (Transfers) minimum assist (75% patient effort)  -RM     Assistive Device (Sit-Stand Transfers) walker, front-wheeled  -RM     Comment, (Sit-Stand Transfer) sit to stand x 1 with rw and maintained for approx 5 minutes.  -RM           User Key  (r) = Recorded By, (t) = Taken By, (c) = Cosigned By    Initials Name Provider Type    RM Mekhi Caceres, PTA Physical Therapist Assistant               Obj/Interventions    No documentation.                Goals/Plan    No documentation.                Clinical Impression     Row Name 06/06/22 1529          Pain    Pretreatment Pain Rating 0/10 - no pain  -RM     Posttreatment Pain Rating 0/10 - no pain  -RM     Row Name 06/06/22 1529          Plan of Care Review    Plan of Care Reviewed With patient  -RM     Progress improving  -RM     Outcome Evaluation Pt recieved supine in bed and willing to participate with treatment. Pt with 8 lpm pnc at 93% with mobility in bed pt was noted to have increased difficulty with breathing and was titrated up to 10L subsequently having to be increased to 15L to miantain O2 at or above 90%.  Pt performed sts from EOB with rw with min a and was able to maintain for approx 30 seconds. Pt was returned to EOB with rest period of approx 5 minutes required. Pt performed lateral scoot x 2 min a to return pt to appropriate position in bed.  Pt was able to return to supine with cga.  Pt required NRBM at 15 L as well as HFNC at 15 L for recover. Pt rebounded to at one point 97% and NFNC was slowly titrated back down to 10L and  then to 8L. NRBM was removed and pt was able to maintain O2 saturation of 92-93% in semireclined position.  See flowsheet for details  -RM     Row Name 06/06/22 1529          Vital Signs    Pre SpO2 (%) 93  -RM     O2 Delivery Pre Treatment hi-flow  8L  -RM     Intra SpO2 (%) 89  -RM     O2 Delivery Intra Treatment hi-flow  HFNC and NRBM at 15L  -RM     Post SpO2 (%) 93  -RM     O2 Delivery Post Treatment hi-flow  8L  -RM     Pre Patient Position Supine  -RM     Intra Patient Position Standing  -RM     Post Patient Position Supine  -RM     Row Name 06/06/22 1529          Positioning and Restraints    Pre-Treatment Position in bed  -RM     Post Treatment Position bed  -RM     In Bed fowlers;call light within reach;encouraged to call for assist;notified nsg  -RM           User Key  (r) = Recorded By, (t) = Taken By, (c) = Cosigned By    Initials Name Provider Type    Mekhi Garza, HAYLIE Physical Therapist Assistant               Outcome Measures     Row Name 06/06/22 1539          How much help from another person do you currently need...    Turning from your back to your side while in flat bed without using bedrails? 4  -RM     Moving from lying on back to sitting on the side of a flat bed without bedrails? 4  -RM     Moving to and from a bed to a chair (including a wheelchair)? 2  -RM     Standing up from a chair using your arms (e.g., wheelchair, bedside chair)? 3  -RM     Climbing 3-5 steps with a railing? 1  -RM     To walk in hospital room? 2  -RM     AM-PAC 6 Clicks Score (PT) 16  -RM     Highest level of mobility 5 --> Static standing  -RM     Row Name 06/06/22 1539 06/06/22 1519       Functional Assessment    Outcome Measure Options AM-PAC 6 Clicks Basic Mobility (PT)  -RM AM-PAC 6 Clicks Daily Activity (OT)  -SD          User Key  (r) = Recorded By, (t) = Taken By, (c) = Cosigned By    Initials Name Provider Type    Mekhi Garza, PTA Physical Therapist Assistant    Jenifer Castle, OT  Occupational Therapist                             Physical Therapy Education                 Title: PT OT SLP Therapies (In Progress)     Topic: Physical Therapy (In Progress)     Point: Mobility training (Done)     Learning Progress Summary           Patient Acceptance, E,TB,D, VU,NR by  at 6/6/2022 1540    Comment: Pursed lip breathing with activity      Show all documentation for this point (4)                 Point: Home exercise program (Not Started)     Learner Progress:  Not documented in this visit.          Point: Body mechanics (Not Started)     Learner Progress:  Not documented in this visit.          Point: Precautions (Done)     Learning Progress Summary           Patient Acceptance, E,TB, VU by  at 5/30/2022 1436    Comment: importance of not over excerting and maintain O2 levels >90%                               User Key     Initials Effective Dates Name Provider Type Discipline    CC 06/16/21 -  Charity Daily PTA Physical Therapist Assistant PT     06/16/21 -  Mekhi Caceres PTA Physical Therapist Assistant PT              PT Recommendation and Plan     Plan of Care Reviewed With: patient  Progress: improving  Outcome Evaluation: Pt recieved supine in bed and willing to participate with treatment. Pt with 8 lpm pnc at 93% with mobility in bed pt was noted to have increased difficulty with breathing and was titrated up to 10L subsequently having to be increased to 15L to miantain O2 at or above 90%.  Pt performed sts from EOB with rw with min a and was able to maintain for approx 30 seconds. Pt was returned to EOB with rest period of approx 5 minutes required. Pt performed lateral scoot x 2 min a to return pt to appropriate position in bed.  Pt was able to return to supine with cga.  Pt required NRBM at 15 L as well as HFNC at 15 L for recover. Pt rebounded to at one point 97% and NFNC was slowly titrated back down to 10L and then to 8L. NRBM was removed and pt was able to maintain O2  saturation of 92-93% in semireclined position.  See flowsheet for details     Time Calculation:    PT Charges     Row Name 06/06/22 1541             Time Calculation    Start Time 1320  -RM      Stop Time 1404  -RM      Time Calculation (min) 44 min  -RM      PT Received On 06/06/22  -RM      PT Goal Re-Cert Due Date 06/07/22  -RM              Time Calculation- PT    Total Timed Code Minutes- PT 44 minute(s)  -RM              Timed Charges    12493 - PT Therapeutic Exercise Minutes 14  -RM      41710 - PT Therapeutic Activity Minutes 30  -RM              Total Minutes    Timed Charges Total Minutes 44  -RM       Total Minutes 44  -RM            User Key  (r) = Recorded By, (t) = Taken By, (c) = Cosigned By    Initials Name Provider Type    Mekhi Garza, PTA Physical Therapist Assistant              Therapy Charges for Today     Code Description Service Date Service Provider Modifiers Qty    50311206066 HC PT THER PROC EA 15 MIN 6/6/2022 Mekhi Caceres, PTA GP 1    35381400673 HC PT THERAPEUTIC ACT EA 15 MIN 6/6/2022 Mekhi Caceres, PTA GP 2          PT G-Codes  Outcome Measure Options: AM-PAC 6 Clicks Basic Mobility (PT)  AM-PAC 6 Clicks Score (PT): 16  AM-PAC 6 Clicks Score (OT): 15    Mekhi Caceres PTA  6/6/2022

## 2022-06-06 NOTE — DISCHARGE PLACEMENT REQUEST
"Gene Maguire (89 y.o. Male)             Date of Birth   1933    Social Security Number       Address   [address] [place] [address]    Home Phone   [phone]    MRN   [id number]       Mosque   None    Marital Status                               Admission Date   22    Admission Type   Emergency    Admitting Provider   Abdoulaye Alcantara MD    Attending Provider   Brayden Fischer MD    Department, Room/Bed   [facility] MED SURG  3, 326/1       Discharge Date       Discharge Disposition       Discharge Destination                               Attending Provider: Brayden Fischer MD    Allergies: No Known Allergies    Isolation: Enh Drop/Con   Infection: COVID (confirmed) (22)   Code Status: No CPR   Advance Care Planning Activity    Ht: 175.3 cm (69.02\")   Wt: 94.6 kg (208 lb 8.9 oz)    Admission Cmt: None   Principal Problem: Acute respiratory failure with hypoxia (HCC) [J96.01]                 Active Insurance as of 2022     Primary Coverage     Payor Plan Insurance Group Employer/Plan Group    HUMANA MEDICARE REPLACEMENT HUMANA MEDICARE REPLACEMENT [id number]     Payor Plan Address Payor Plan Phone Number Payor Plan Fax Number Effective Dates    [address] [address] [phone]  2018 - None Entered    [facility] [id number]       Subscriber Name Subscriber Birth Date Member ID       GENE MAGUIRE 3/14/1933 [id number]                 Emergency Contacts      (Rel.) Home Phone Work Phone Mobile Phone    DARLIN MAGUIRE (Spouse) [phone] -- --    BRANDY [phone] -- --               History & Physical      Matthew Cunningham MD at 22 [facility]            [facility]   HISTORY AND PHYSICAL      Name:  Gene Maguire   Age:  89 y.o.  Sex:  male  :  3/14/1933  MRN:  [id number]   Visit Number:  [id number]  Admission Date:  2022  Date Of Service:  22  Primary Care Physician:  Provider, No Known    Chief " "Complaint:     Shortness of breath x 2 weeks    History Of Presenting Illness:      Mr. Mobley is an 89 year old  male with a past medical history of hypertension, type 2 diabetes mellitus and a \"problem with the pancreas\" who presented to the ED with a chief complaint of gradually worsening shortness of breath x 2 weeks. He has also had a dry cough and nausea along with intermittent fevers (highest recorded fever was 100.4 degrees F). He was diagnosed with COVID-19 approximately 10 days ago by his primary care provider and since then has been taking an antibiotic twice daily, the name of which he does not remember. He has not been taking steroids. He saw his primary care provider earlier today who diagnosed him with a pneumonia and prompted him to go to the ED. He has not been vaccinated against COVID-19. He has had very poor appetite and oral intake. Creatinine level is 1.49 (no baseline). BUN is 24. ABG is 7.47/31/56 on RA. , ferritin 1497, crp 17, d-dimer 0.64, procal 0.14. CTA chest  PE protocol revealed a viral pneumonia and ruled out a pulmonary embolism. He denies having had chest pain, diarrhea or dysuria.     Initial vitals from the ED:  T 99.1 degrees F - P 69 - RR 19 - /69 - SpO2 92% RA    Studies from the ED:  ABG 7.47/31/56 on RA  Troponin T <0.01, ProBNP 194  , ferritin 1497, crp 17, d-dimer 0.64, procal 0.14  Na 134, K 4.4, Cr 1.49 (no baseline)  Wbc 8.6, hgb 12.2, platelets 249  EKG: NSR HR 72 RBBB  CTA chest  PE protocol - Viral pneumonia. No pulmonary embolism.  Gallstones.    ED Medications: NS bolus x 500mL, Rocephin 1g, Azithromycin IV 500mg    Review Of Systems:    All systems were reviewed and negative except as mentioned in history of presenting illness, assessment and plan.    Past Medical History: Patient  has a past medical history of Diabetes mellitus (HCC) and Hypertension.    Past Surgical History: Patient  has a past surgical history that includes Hernia " "repair.    Social History: Patient  reports that he has quit smoking. He does not have any smokeless tobacco history on file. He reports that he does not drink alcohol and does not use drugs. He lives with his wife and at baseline ambulates with a walker.    Family History: Patient's family history is not on file.    Allergies:      Patient has no known allergies.    Home Medications:    Prior to Admission Medications     None          Vital Signs:  Temp:  [99.1 °F (37.3 °C)] 99.1 °F (37.3 °C)  Heart Rate:  [69-79] 76  Resp:  [18-20] 18  BP: (136-171)/(69-84) 167/72        05/25/22  1737   Weight: 102 kg (225 lb)     Body mass index is 33.23 kg/m².    Physical Exam:     Most recent vital Signs: /72   Pulse 76   Temp 99.1 °F (37.3 °C) (Oral)   Resp 18   Ht 175.3 cm (69\")   Wt 102 kg (225 lb)   SpO2 94%   BMI 33.23 kg/m²     General Appearance:  Alert and cooperative. NAD.   Head:  Atraumatic and normocephalic.   Eyes: Conjunctivae and sclerae normal, no icterus. No pallor.   Throat: No oral lesions, oral mucosa moist.   Neck: Supple, trachea midline.   Lungs:   Breath sounds heard bilaterally equally.  No wheezing or crackles. Tachypnea. +use of accessory muscles.   Heart:  Normal S1 and S2, no murmur, no gallop, no rub.    Abdomen:   Soft, nontender, nondistended, no rebound tenderness.   Extremities: Supple, no edema, no cyanosis.   Skin: No bleeding or rash.   Neurologic: No facial asymmetry. Moves all four limbs. No tremors.      Laboratory data:    I have reviewed the labs done in the emergency room.    Results from last 7 days   Lab Units 05/25/22  1850   SODIUM mmol/L 134*   POTASSIUM mmol/L 4.4   CHLORIDE mmol/L 101   CO2 mmol/L 21.5*   BUN mg/dL 24*   CREATININE mg/dL 1.49*   CALCIUM mg/dL 8.6   BILIRUBIN mg/dL 0.5   ALK PHOS U/L 69   ALT (SGPT) U/L 35   AST (SGOT) U/L 43*   GLUCOSE mg/dL 166*     Results from last 7 days   Lab Units 05/25/22  1850   WBC 10*3/mm3 8.63   HEMOGLOBIN g/dL 12.2* "   HEMATOCRIT % 36.7*   PLATELETS 10*3/mm3 249         Results from last 7 days   Lab Units 05/25/22  1850   TROPONIN T ng/mL <0.010     Results from last 7 days   Lab Units 05/25/22  1850   PROBNP pg/mL 194.2             Results from last 7 days   Lab Units 05/25/22  1917   PH, ARTERIAL pH units 7.472*   PO2 ART mm Hg 56.0*   PCO2, ARTERIAL mm Hg 31.1*   HCO3 ART mmol/L 22.7           Invalid input(s): USDES,  BLOODU, NITRITITE, BACT, EP    Pain Management Panel    There is no flowsheet data to display.         Radiology:    CT Angiogram Chest Pulmonary Embolism    Result Date: 5/25/2022  FINAL REPORT TECHNIQUE: Thin section axial images were obtained through the chest and during the arterial phase of IV contrast administration. Coronal 3-D MIP reconstructed images were also provided. CLINICAL HISTORY: recent covid soa FINDINGS: The pulmonary arteries are well-opacified and there is no filling defect to indicate pulmonary embolism. The thoracic aorta is normal. There are multifocal bilateral patchy areas of groundglass/airspace opacity consistent with pneumonia, including viral pneumonia. There is no pleural disease, adenopathy or significant osseous abnormality.  In the upper abdomen, there are multiple dependent gallstones in an otherwise normal gallbladder.     Viral pneumonia. No pulmonary embolism.  Gallstones. Authenticated by James Pedraza M.D. on 05/25/2022 10:29:36 PM      Assessment/Plan:    Acute respiratory failure without hypoxia  -Secondary to COVID-19 pneumonia  -Will monitor on continuous pulse oximetry.  -Will provide supplemental oxygen as needed to maintain SpO2 >/=90%.  -The patient has already completed almost 2 weeks of an antibiotic, the name of which he does not remember. He also received Rocephin and azithromycin in the ED. I will hold off on starting an antibiotic.     Acute kidney injury  -Creatinine level is 1.49 (no baseline). BUN is 24.   -Likely secondary to decreased oral intake.  -The  patient received NS x 500mL in the ED. Will start NS @ 100mL/hr.  -Will recheck Cr in the morning.    Type 2 diabetes mellitus  -Will order a hemoglobin a1c level.  -Start POC glucose TID AC  -Start sliding scale, low dose.    The patient's wife will provide a home medication list in the morning.     Risk Assessment: Moderate to High  DVT Prophylaxis: Heparin subcu  Code Status: DNR/DNI  Diet: Cardiac/Consistent Carb      Matthew Cunningham MD  05/25/22  23:34 EDT    Electronically signed by Matthew Cunningham MD at 05/26/22 0615         Current Facility-Administered Medications   Medication Dose Route Frequency Provider Last Rate Last Admin   • acetaminophen (TYLENOL) tablet 650 mg  650 mg Oral Q4H PRN Matthew Cunningham MD   650 mg at 05/29/22 0032   • albuterol sulfate HFA (PROVENTIL HFA;VENTOLIN HFA;PROAIR HFA) inhaler 2 puff  2 puff Inhalation 4x Daily - RT Matthew Cunningham MD   2 puff at 06/06/22 1130   • amLODIPine (NORVASC) tablet 10 mg  10 mg Oral Daily Abdoulaye Alcantara MD   10 mg at 06/06/22 0821   • ascorbic acid (VITAMIN C) tablet 500 mg  500 mg Oral Daily Abdoulaye Alcantara MD   500 mg at 06/06/22 0821   • budesonide-formoterol (SYMBICORT) 160-4.5 MCG/ACT inhaler 2 puff  2 puff Inhalation BID - RT Werner Ace MD   2 puff at 06/06/22 0830   • carvedilol (COREG) tablet 12.5 mg  12.5 mg Oral BID With Meals Abdoulaye Alcantara MD   12.5 mg at 06/06/22 0821   • dexamethasone (DECADRON) IVPB 10 mg  10 mg Intravenous Daily Abdoulaye Alcantara MD   10 mg at 06/06/22 0822   • dextrose (D50W) (25 g/50 mL) IV injection 25 g  25 g Intravenous Q15 Min PRN Matthew Cunningham MD       • dextrose (GLUTOSE) oral gel 1 tube  1 tube Oral Q15 Min PRN Matthew Cunningham MD       • furosemide (LASIX) injection 40 mg  40 mg Intravenous Once Brayden Fischer MD       • glucagon (human recombinant) (GLUCAGEN DIAGNOSTIC) injection 1 mg  1 mg Subcutaneous PRN Matthew Cunningham MD       • heparin (porcine) 5000 UNIT/ML injection 5,000 Units  5,000 Units Subcutaneous Q12H Matthew Cunningham MD   5,000  Units at 06/06/22 0821   • hydrALAZINE (APRESOLINE) injection 10 mg  10 mg Intravenous Q6H PRN Matthew Cunningham MD   10 mg at 05/29/22 0958   • Insulin Aspart (novoLOG) injection 0-7 Units  0-7 Units Subcutaneous TID AC Matthew Cunningham MD   5 Units at 06/06/22 1129   • insulin detemir (LEVEMIR) injection 20 Units  20 Units Subcutaneous Q12H Brayden Fischer MD   20 Units at 06/06/22 0830   • losartan (COZAAR) tablet 100 mg  100 mg Oral Daily Brayden Fischer MD   100 mg at 06/06/22 0821   • melatonin tablet 5 mg  5 mg Oral Nightly PRN Matthew Cunningham MD   5 mg at 05/29/22 2021   • multivitamin with minerals 1 tablet  1 tablet Oral Daily Abdoulaye Alcantara MD   1 tablet at 06/06/22 0821   • ondansetron (ZOFRAN) tablet 4 mg  4 mg Oral Q6H PRN Matthew Cunningham MD        Or   • ondansetron (ZOFRAN) injection 4 mg  4 mg Intravenous Q6H PRN Matthew Cunningham MD       • sodium chloride 0.9 % flush 10 mL  10 mL Intravenous Q12H Matthew Cunningham MD   10 mL at 06/06/22 0822   • sodium chloride 0.9 % flush 10 mL  10 mL Intravenous PRN Matthew Cunningham MD   10 mL at 05/31/22 1141   • tamsulosin (FLOMAX) 24 hr capsule 0.4 mg  0.4 mg Oral Nightly Abdoulaye Alcantara MD   0.4 mg at 06/05/22 2020     Physician Progress Notes (last 24 hours)  Notes from 06/05/22 1339 through 06/06/22 1339   No notes of this type exist for this encounter.            Physical Therapy Notes (last 48 hours)      Charity Daily PTA at 06/05/22 1300  Version 1 of 1       Per chart review and per nsg pt with increased O2 requirements with HFNC increased to 15 L+15L NRB with O2 SATS dropping in low 80's when  nsg attempting to tirate down. PT to f/u with pt at later date       Electronically signed by Charity Daily PTA at 06/05/22 2023       Occupational Therapy Notes (last 48 hours)  Notes from 06/04/22 1340 through 06/06/22 1340   No notes exist for this encounter.

## 2022-06-06 NOTE — PLAN OF CARE
Goal Outcome Evaluation:  Plan of Care Reviewed With: patient        Progress: declining  Outcome Evaluation: pleasant patient with no complaint of pain at this time-medications given per Dr. Fischer's orders-patient now requiring 15 L High flow nasal cannula with non-rebreather-monitor labs and continue to monitor patient-patient repositioned with pillows placed under feet and at foot of bed

## 2022-06-07 NOTE — THERAPY RE-EVALUATION
Patient Name: Gene Mobley  : 3/14/1933    MRN: [id number]                              Today's Date: 2022       Admit Date: 2022    Visit Dx:     ICD-10-CM ICD-9-CM   1. Pneumonia due to COVID-19 virus  U07.1 480.8    J12.82 079.89   2. Hypoxia  R09.02 799.02   3. GOMEZ (acute kidney injury) (HCC)  N17.9 584.9     Patient Active Problem List   Diagnosis   • Pneumonia due to COVID-19 virus   • GOMEZ (acute kidney injury) (HCC)   • Acute respiratory failure with hypoxia (HCC)   • Essential hypertension     Past Medical History:   Diagnosis Date   • Diabetes mellitus (HCC)    • Hypertension    • Impaired functional mobility, balance, gait, and endurance      Past Surgical History:   Procedure Laterality Date   • HERNIA REPAIR        General Information     Row Name 22 1438          Physical Therapy Time and Intention    Document Type re-evaluation (P)   -     Mode of Treatment physical therapy (P)   -     Row Name 22 1438          General Information    Patient Profile Reviewed yes (P)   -     Prior Level of Function independent:;all household mobility (P)   -     Existing Precautions/Restrictions fall;oxygen therapy device and L/min (P)   -     Barriers to Rehab medically complex (P)   -     Row Name 22 1438          Living Environment    People in Home spouse (P)   -     Row Name 22 1438          Home Main Entrance    Number of Stairs, Main Entrance none (P)   -     Row Name 22 1438          Cognition    Orientation Status (Cognition) oriented x 4 (P)   -     Row Name 22 1438          Safety Issues, Functional Mobility    Safety Issues Affecting Function (Mobility) safety precautions follow-through/compliance;safety precaution awareness;sequencing abilities (P)   -     Impairments Affecting Function (Mobility) balance;endurance/activity tolerance;shortness of breath;strength (P)   -           User Key  (r) = Recorded By, (t) = Taken By, (c) =  Cosigned By    Initials Name Provider Type    Nathan Madison, PT Student PT Student               Mobility     Row Name 06/07/22 1438          Bed Mobility    Bed Mobility rolling left;rolling right (P)   -     Rolling Left Hewitt (Bed Mobility) standby assist;verbal cues (P)   -MK     Rolling Right Hewitt (Bed Mobility) standby assist;verbal cues (P)   -     Scooting/Bridging Hewitt (Bed Mobility) standby assist (P)   -     Assistive Device (Bed Mobility) bed rails;head of bed elevated (P)   -     Comment, (Bed Mobility) maintained long sit for approx 5 min (P)   -           User Key  (r) = Recorded By, (t) = Taken By, (c) = Cosigned By    Initials Name Provider Type    Nathan Madison, PT Student PT Student               Obj/Interventions    No documentation.                Goals/Plan     Row Name 06/07/22 1438          Bed Mobility Goal 1 (PT)    Activity/Assistive Device (Bed Mobility Goal 1, PT) bed mobility activities, all (P)   -     Hewitt Level/Cues Needed (Bed Mobility Goal 1, PT) minimum assist (75% or more patient effort) (P)   -MK     Time Frame (Bed Mobility Goal 1, PT) short term goal (STG) (P)   -MK     Strategies/Barriers (Bed Mobility Goal 1, PT) maintaining oxygen saturation levels at least 90% (P)   -MK     Progress/Outcomes (Bed Mobility Goal 1, PT) goal ongoing (P)   -     Row Name 06/07/22 1438          Transfer Goal 1 (PT)    Activity/Assistive Device (Transfer Goal 1, PT) sit-to-stand/stand-to-sit;bed-to-chair/chair-to-bed;walker, rolling (P)   -MK     Hewitt Level/Cues Needed (Transfer Goal 1, PT) minimum assist (75% or more patient effort) (P)   -MK     Time Frame (Transfer Goal 1, PT) 10 days (P)   -MK     Strategies/Barriers (Transfers Goal 1, PT) maintaining oxygen saturation levels of at least 90% (P)   -MK     Progress/Outcome (Transfer Goal 1, PT) goal ongoing (P)   -     Row Name 06/07/22 1438          Gait Training Goal 1 (PT)     Activity/Assistive Device (Gait Training Goal 1, PT) gait (walking locomotion);assistive device use;walker, rolling (P)   -MK     Holmes Level (Gait Training Goal 1, PT) minimum assist (75% or more patient effort) (P)   -MK     Distance (Gait Training Goal 1, PT) maintaining oxygen saturation levels of at least 90% (P)   -MK     Time Frame (Gait Training Goal 1, PT) long term goal (LTG) (P)   -MK     Progress/Outcome (Gait Training Goal 1, PT) goal ongoing (P)   -MK     Row Name 06/07/22 1438          Patient Education Goal (PT)    Activity (Patient Education Goal, PT) Perform HEP x 20 (P)   -MK     Holmes/Cues/Accuracy (Memory Goal 2, PT) demonstrates adequately (P)   -MK     Time Frame (Patient Education Goal, PT) short term goal (STG) (P)   -MK     Barriers (Patient Education Goal, PT) maintaining oxygen saturation levels of at least 90% (P)   -MK     Progress/Outcome (Patient Education Goal, PT) goal ongoing (P)   -MK           User Key  (r) = Recorded By, (t) = Taken By, (c) = Cosigned By    Initials Name Provider Type    Nathan Madison, PT Student PT Student               Clinical Impression     Row Name 06/07/22 1431          Plan of Care Review    Plan of Care Reviewed With patient (P)   -MK     Progress improving (P)   -MK     Outcome Evaluation Pt participated well in PT re-evaluation. Pt briefs were soiled, so pt rolled to B sides with VC and SBA. Pt took frequent rest breaks due to O2 sats decreasing. Pt was instructed in huffing techniques and practiced resulting in increase in O2 sats. Pt was in long sitting for 5 min while continuing to practice huffing techniques. Pt was left with OT. Progress per POC and pt tolerance. (P)   -MK     Row Name 06/07/22 143          Therapy Assessment/Plan (PT)    Rehab Potential (PT) good, to achieve stated therapy goals (P)   -MK     Criteria for Skilled Interventions Met (PT) meets criteria (P)   -MK     Therapy Frequency (PT) 5 times/wk (P)   -MK      Row Name 06/07/22 1438          Vital Signs    O2 Delivery Pre Treatment supplemental O2 (P)   -MK     O2 Delivery Intra Treatment supplemental O2 (P)   -MK     O2 Delivery Post Treatment supplemental O2 (P)   -MK     Pre Patient Position Supine (P)   -MK     Intra Patient Position Sitting (P)   -MK     Post Patient Position Supine (P)   -MK     Row Name 06/07/22 1438          Positioning and Restraints    Pre-Treatment Position in bed (P)   -MK     Post Treatment Position bed (P)   -MK     In Bed with OT;sitting;call light within reach;encouraged to call for assist (P)   -MK           User Key  (r) = Recorded By, (t) = Taken By, (c) = Cosigned By    Initials Name Provider Type    Nathan Madison, PT Student PT Student               Outcome Measures     Row Name 06/07/22 1438          How much help from another person do you currently need...    Turning from your back to your side while in flat bed without using bedrails? 4 (P)   -MK     Moving from lying on back to sitting on the side of a flat bed without bedrails? 4 (P)   -MK     Moving to and from a bed to a chair (including a wheelchair)? 2 (P)   -MK     Standing up from a chair using your arms (e.g., wheelchair, bedside chair)? 3 (P)   -MK     Climbing 3-5 steps with a railing? 1 (P)   -MK     To walk in hospital room? 2 (P)   -MK     AM-PAC 6 Clicks Score (PT) 16 (P)   -MK     Highest level of mobility 5 --> Static standing (P)   -MK     Row Name 06/07/22 1438          Functional Assessment    Outcome Measure Options AM-PAC 6 Clicks Basic Mobility (PT) (P)   -MK           User Key  (r) = Recorded By, (t) = Taken By, (c) = Cosigned By    Initials Name Provider Type    Nathan Madison, PT Student PT Student                             Physical Therapy Education                 Title: PT OT SLP Therapies (In Progress)     Topic: Physical Therapy (In Progress)     Point: Mobility training (Done)     Learning Progress Summary           Patient Acceptance, E, VU  by  at 6/7/2022 1615      Show all documentation for this point (5)                 Point: Home exercise program (Not Started)     Learner Progress:  Not documented in this visit.          Point: Body mechanics (Not Started)     Learner Progress:  Not documented in this visit.          Point: Precautions (Done)     Learning Progress Summary           Patient Acceptance, E,TB, VU by  at 5/30/2022 1436    Comment: importance of not over excerting and maintain O2 levels >90%                               User Key     Initials Effective Dates Name Provider Type Discipline    CC 06/16/21 -  Charity Daily, PTA Physical Therapist Assistant PT     05/11/22 -  Nathan Wright, PT Student PT Student PT              PT Recommendation and Plan     Plan of Care Reviewed With: (P) patient  Progress: (P) improving  Outcome Evaluation: (P) Pt participated well in PT re-evaluation. Pt briefs were soiled, so pt rolled to B sides with VC and SBA. Pt took frequent rest breaks due to O2 sats decreasing. Pt was instructed in huffing techniques and practiced resulting in increase in O2 sats. Pt was in long sitting for 5 min while continuing to practice huffing techniques. Pt was left with OT. Progress per POC and pt tolerance.     Time Calculation:    PT Charges     Row Name 06/07/22 1438             Time Calculation    Start Time 1438 (P)   -      PT Received On 06/07/22 (P)   -      PT Goal Re-Cert Due Date 06/17/22 (P)   -              Untimed Charges    PT Eval/Re-eval Minutes 45 (P)   -              Total Minutes    Untimed Charges Total Minutes 45 (P)   -       Total Minutes 45 (P)   -            User Key  (r) = Recorded By, (t) = Taken By, (c) = Cosigned By    Initials Name Provider Type     Nathan Wright, PT Student PT Student              Therapy Charges for Today     Code Description Service Date Service Provider Modifiers Qty    [id number] HC PT RE-EVAL ESTABLISHED PLAN 2 6/7/2022 Nathan Wright, PT  Student GP 1          PT G-Codes  Outcome Measure Options: (P) AM-PAC 6 Clicks Basic Mobility (PT)  AM-PAC 6 Clicks Score (PT): (P) 16  AM-PAC 6 Clicks Score (OT): 15    Nathan Wright, PT Student  6/7/2022

## 2022-06-07 NOTE — PLAN OF CARE
Problem: Adult Inpatient Plan of Care  Goal: Plan of Care Review  Outcome: Ongoing, Progressing  Flowsheets (Taken 6/6/2022 1729 by Tasha Lawton RN)  Progress: improving  Goal: Patient-Specific Goal (Individualized)  Outcome: Ongoing, Progressing  Goal: Absence of Hospital-Acquired Illness or Injury  Outcome: Ongoing, Progressing  Intervention: Identify and Manage Fall Risk  Recent Flowsheet Documentation  Taken 6/7/2022 0405 by Hayley Murray RN  Safety Promotion/Fall Prevention: safety round/check completed  Taken 6/7/2022 0212 by Hayley Murray RN  Safety Promotion/Fall Prevention: safety round/check completed  Taken 6/7/2022 0000 by Hayley Murray RN  Safety Promotion/Fall Prevention: safety round/check completed  Taken 6/6/2022 2200 by Hayley Murray RN  Safety Promotion/Fall Prevention: safety round/check completed  Taken 6/6/2022 2017 by Hayley Murray RN  Safety Promotion/Fall Prevention:   activity supervised   safety round/check completed  Goal: Optimal Comfort and Wellbeing  Outcome: Ongoing, Progressing  Goal: Readiness for Transition of Care  Outcome: Ongoing, Progressing     Problem: Fall Injury Risk  Goal: Absence of Fall and Fall-Related Injury  Outcome: Ongoing, Progressing  Intervention: Promote Injury-Free Environment  Recent Flowsheet Documentation  Taken 6/7/2022 0405 by Hayley Murray RN  Safety Promotion/Fall Prevention: safety round/check completed  Taken 6/7/2022 0212 by Hayley Murray RN  Safety Promotion/Fall Prevention: safety round/check completed  Taken 6/7/2022 0000 by Hayley Murray RN  Safety Promotion/Fall Prevention: safety round/check completed  Taken 6/6/2022 2200 by Hayley Murray RN  Safety Promotion/Fall Prevention: safety round/check completed  Taken 6/6/2022 2017 by Hayley Murray RN  Safety Promotion/Fall Prevention:   activity supervised   safety round/check completed     Problem: Breathing Pattern Ineffective  Goal:  Effective Breathing Pattern  Outcome: Ongoing, Progressing  Intervention: Promote Improved Breathing Pattern  Recent Flowsheet Documentation  Taken 6/6/2022 2017 by Hayley Murray RN  Head of Bed ([facility]) Positioning: [facility] elevated     Problem: Gas Exchange Impaired  Goal: Optimal Gas Exchange  Outcome: Ongoing, Progressing  Intervention: Optimize Oxygenation and Ventilation  Recent Flowsheet Documentation  Taken 6/6/2022 2017 by Hayley Murray RN  Head of Bed ([facility]) Positioning: [facility] elevated     Problem: Skin Injury Risk Increased  Goal: Skin Health and Integrity  Outcome: Ongoing, Progressing  Intervention: Optimize Skin Protection  Recent Flowsheet Documentation  Taken 6/6/2022 2017 by Hayley Murray RN  Head of Bed ([facility]) Positioning: HOB elevated     Problem: Fluid Imbalance (Hospitalized Older Adult)  Goal: Fluid Balance  Outcome: Ongoing, Progressing     Problem: Functional Ability Impaired (Hospitalized Older Adult)  Goal: Optimal Functional Ability  Outcome: Ongoing, Progressing     Problem: Oral Intake Inadequate (Hospitalized Older Adult)  Goal: Optimal Nutrition Intake  Outcome: Ongoing, Progressing   Goal Outcome Evaluation:

## 2022-06-07 NOTE — PROGRESS NOTES
[facility]    Name:  Gene Mobley   Age:  89 y.o.  Sex:  male  :  3/14/1933  MRN:  [id number]   Visit Number:  [id number]  Admission Date:  2022  Date Of Service:  22  Primary Care Physician:  Provider, No Known     LOS: 13 days :    Chief Complaint:      Shortness of breath.    Subjective:    Mr. Mobley was seen and examined this morning.  He is currently lying down on the bed and is comfortable at rest.  He is currently on 8 L of nasal cannula oxygen and saturating in the mid 90s.  He seems to be more quiet compared to last week.  Unfortunately he spends most of his day on his bed.  He did however work with physical therapy yesterday but his oxygen had to be bumped up to 15 L during exercise.  No fevers.    Hospital Course:    Mr. Mobley is an 89-year-old male with history of hypertension, diabetes mellitus type 2 was admitted from the emergency room with cough, shortness of breath for 2 weeks.  Patient apparently was diagnosed with COVID-19 approximately 10 days prior to admission by his primary care provider and was treated with antibiotics therapy and steroids.  In the emergency room, his initial temperature was 99.1, pulse 69, blood pressure 171/69 and pulse oxygen saturation of 92% on room air.  His pulse oxygen saturation subsequently decreased into the low 80s and he was placed on 5 L of nasal cannula oxygen.  Blood work done in the emergency room revealed a creatinine of 1.49 (baseline) and hemoglobin of 12.  Patient received IV fluids and was treated symptomatically in the emergency room.  He was not continued on antibiotics therapy and was not placed on steroids.  His home medications for blood pressure were restarted.  Patient's FiO2 requirements did bump up and he had to be placed on 15 L nasal cannula oxygen the next day.  He subsequently slowly improved with a decrease in his FiO2 requirements.  He was noted to have elevated blood sugars and  his Levemir dose was adjusted accordingly.    Unfortunately, patient's oxygen requirements continued to worsen through the hospital stay and he was started on IV dexamethasone.  His procalcitonin remain negative and he was not initiated on any antibiotics.  Due to steroid therapy, his blood sugars did worsen and his Levemir dose was uptitrated accordingly.  He was seen by Dr. Ace from pulmonology.  Patient improved slowly over the course of the next 1 week and his oxygen was down titrated to 8 L.  He was continued on physical and occupational therapy.    Review of Systems:     All systems were reviewed and negative except as mentioned in subjective, assessment and plan.    Vital Signs:    Temp:  [97.1 °F (36.2 °C)-98.2 °F (36.8 °C)] 97.4 °F (36.3 °C)  Heart Rate:  [61-80] 61  Resp:  [18-22] 18  BP: (124-147)/(70-76) 130/76    Intake and output:    I/O last 3 completed shifts:  In: 1250 [P.O.:1250]  Out: 2195 [Urine:2195]  I/O this shift:  In: 240 [P.O.:240]  Out: 500 [Urine:500]    Physical Examination:    General Appearance:  Alert and cooperative.   Head:  Atraumatic and normocephalic.   Eyes: Conjunctivae and sclerae normal, no icterus. No pallor.   Throat: No oral lesions, no thrush, oral mucosa moist.   Neck: Supple, trachea midline, no thyromegaly.   Lungs:   Breath sounds heard bilaterally equally.  No wheezing.  Bilateral scattered crackles heard.  No Pleural rub or bronchial breathing.   Heart:  Normal S1 and S2, no murmur, no gallop, no rub. No JVD.   Abdomen:   Normal bowel sounds, no masses, no organomegaly. Soft, nontender, nondistended, no rebound tenderness.   Extremities: Supple, 1+ pitting ankle edema, no cyanosis, no clubbing.   Skin: No bleeding or rash.   Neurologic: Alert and oriented x 3. No facial asymmetry. Moves all four limbs but does have generalized weakness. No tremors.      Laboratory results:    Results from last 7 days   Lab Units 06/05/22  0634 06/02/22  0705 06/01/22  0500    SODIUM mmol/L 136 138 141   POTASSIUM mmol/L 4.6 4.8 5.3*   CHLORIDE mmol/L 104 103 106   CO2 mmol/L 24.1 22.4 22.2   BUN mg/dL 44* 43* 43*   CREATININE mg/dL 1.22 1.18 1.19   CALCIUM mg/dL 8.6 8.1* 8.1*   GLUCOSE mg/dL 214* 278* 237*     Results from last 7 days   Lab Units 06/02/22  0705 06/01/22  0543   WBC 10*3/mm3 10.47 13.46*   HEMOGLOBIN g/dL 11.4* 12.0*   HEMATOCRIT % 33.2* 35.4*   PLATELETS 10*3/mm3 379 390     I have reviewed the patient's laboratory results.    Radiology results:    No radiology results from the last 24 hrs    Medication Review:     I have reviewed the patient's active and prn medications.     Problem List:      Acute respiratory failure with hypoxia (HCC)    Pneumonia due to COVID-19 virus    GOMEZ (acute kidney injury) (HCC)    Essential hypertension    Assessment:    1. Acute hypoxic respiratory failure, POA.  2. Bronchitis secondary to COVID-19 infection, POA.  3. Chronic kidney disease stage II.  4. Cholelithiasis noted on CT scan.  5. Essential hypertension.  6. Diabetes mellitus type 2.    Plan:    Respiratory failure/COVID-19.  -  Continue high flow oxygen at 8 L.  -  I have strongly advised him to sit up on the chair and use the incentive spirometry.  -  2D echocardiogram done during this hospitalization showed normal left ventricular ejection fraction at 65% with grade 1 diastolic dysfunction.  -  Continue dexamethasone (day 10/10).  -  Continue multivitamins and vitamin C.    Chronic kidney disease/hypertension.  - Renal function is at baseline.  - Continue home medications including carvedilol, losartan and amlodipine.  - Carvedilol has been increased to 12.5 mg twice daily.    Diabetes mellitus type 2.  - Blood sugars elevated due to steroid use.  - Continue Levemir 20 units twice daily.  - Continue subcutaneous insulin protocol for coverage.  - Hemoglobin A1c 8.4 on 5/26/2022.    I discussed the patient's condition and treatment plan with his wife Melissa over the phone  today.      Discussed with nursing staff and multidisciplinary team.    DVT Prophylaxis: Heparin  Code Status: DNR/DNI  Diet: Cardiac diabetic  Discharge Plan: Pending- short-term rehab placement in 2 to 3 days.    Abdoulaye Alcantara MD  06/07/22  14:03 EDT    Dictated utilizing Dragon dictation.

## 2022-06-07 NOTE — THERAPY TREATMENT NOTE
Patient Name: Gene Mobley  : 3/14/1933    MRN: [id number]                              Today's Date: 2022       Admit Date: 2022    Visit Dx:     ICD-10-CM ICD-9-CM   1. Pneumonia due to COVID-19 virus  U07.1 480.8    J12.82 079.89   2. Hypoxia  R09.02 799.02   3. GOMEZ (acute kidney injury) (HCC)  N17.9 584.9     Patient Active Problem List   Diagnosis   • Pneumonia due to COVID-19 virus   • GOMEZ (acute kidney injury) (HCC)   • Acute respiratory failure with hypoxia (HCC)   • Essential hypertension     Past Medical History:   Diagnosis Date   • Diabetes mellitus (HCC)    • Hypertension    • Impaired functional mobility, balance, gait, and endurance      Past Surgical History:   Procedure Laterality Date   • HERNIA REPAIR        General Information     Row Name 22 1635          OT Time and Intention    Document Type therapy note (daily note)  -SD     Mode of Treatment occupational therapy  -SD     Row Name 22 1635          General Information    Patient Profile Reviewed yes  -SD           User Key  (r) = Recorded By, (t) = Taken By, (c) = Cosigned By    Initials Name Provider Type    SD Jenifer Natarajan OT Occupational Therapist                 Mobility/ADL's     Row Name 22 1635          Bed Mobility    Bed Mobility rolling left;rolling right  -SD     Rolling Left White (Bed Mobility) standby assist  -SD     Rolling Right White (Bed Mobility) standby assist  -SD     Scooting/Bridging White (Bed Mobility) standby assist  -SD     Assistive Device (Bed Mobility) bed rails;head of bed elevated  -SD     Row Name 22 1636          Grooming Assessment/Training    White Level (Grooming) hair care, combing/brushing;oral care regimen;wash face, hands;shave face  -SD     Position (Grooming) sitting up in bed  -SD     Comment, (Grooming) patient was able to perform oral care, wash face and hair care with S/U. Assist to shave face for safety  -SD           User Key   (r) = Recorded By, (t) = Taken By, (c) = Cosigned By    Initials Name Provider Type    Jenifer Castle OT Occupational Therapist               Obj/Interventions    No documentation.                Goals/Plan    No documentation.                Clinical Impression     Row Name 06/07/22 1637          Pain Assessment    Pretreatment Pain Rating 0/10 - no pain  -SD     Posttreatment Pain Rating 0/10 - no pain  -SD     Row Name 06/07/22 1637          Plan of Care Review    Plan of Care Reviewed With patient  -SD     Progress improving  -SD     Outcome Evaluation OT tx completed. Patient completed all bed mobility with SBA, completed hair care, washing face and oral hygiene with SBA, therapist assisted patient in shaving face sitting up in bed. Patient able to long sit in bed and demonstrate improved breathing techniques with verbal cues. Further activity held d/t fatigue. O2 on 5.5L ranged 96, 89, 95 before, during, after activity. Continue OT POC  -SD     Row Name 06/07/22 1637          Vital Signs    Pre SpO2 (%) 96  -SD     O2 Delivery Pre Treatment supplemental O2  -SD     Intra SpO2 (%) 89  -SD     O2 Delivery Intra Treatment supplemental O2  -SD     Post SpO2 (%) 95  -SD     O2 Delivery Post Treatment supplemental O2  -SD     Row Name 06/07/22 1637          Positioning and Restraints    Pre-Treatment Position in bed  -SD     Post Treatment Position bed  -SD     In Bed fowlers;call light within reach;encouraged to call for assist  -SD           User Key  (r) = Recorded By, (t) = Taken By, (c) = Cosigned By    Initials Name Provider Type    Jenifer Castle OT Occupational Therapist               Outcome Measures     Row Name 06/07/22 1646          How much help from another is currently needed...    Putting on and taking off regular lower body clothing? 2  -SD     Bathing (including washing, rinsing, and drying) 2  -SD     Toileting (which includes using toilet bed pan or urinal) 2  -SD     Putting on and  taking off regular upper body clothing 3  -SD     Taking care of personal grooming (such as brushing teeth) 3  -SD     Eating meals 4  -SD     AM-PAC 6 Clicks Score (OT) 16  -SD     Row Name 06/07/22 1438          How much help from another person do you currently need...    Turning from your back to your side while in flat bed without using bedrails? 4  -MS (r) MK (t) MS (c)     Moving from lying on back to sitting on the side of a flat bed without bedrails? 4  -MS (r) MK (t) MS (c)     Moving to and from a bed to a chair (including a wheelchair)? 2  -MS (r) MK (t) MS (c)     Standing up from a chair using your arms (e.g., wheelchair, bedside chair)? 3  -MS (r) MK (t) MS (c)     Climbing 3-5 steps with a railing? 1  -MS (r) MK (t) MS (c)     To walk in hospital room? 2  -MS (r) MK (t) MS (c)     AM-PAC 6 Clicks Score (PT) 16  -MS (r) MK (t)     Highest level of mobility 5 --> Static standing  -MS (r) MK (t)     Row Name 06/07/22 1646 06/07/22 1438       Functional Assessment    Outcome Measure Options AM-PAC 6 Clicks Daily Activity (OT)  -SD AM-PAC 6 Clicks Basic Mobility (PT)  -MS (r) MK (t) MS (c)          User Key  (r) = Recorded By, (t) = Taken By, (c) = Cosigned By    Initials Name Provider Type    Jenifer Castle, OT Occupational Therapist    Teodoro Phillips, PT Physical Therapist    Nathan Madison, PT Student PT Student                Occupational Therapy Education                 Title: PT OT SLP Therapies (In Progress)     Topic: Occupational Therapy (Done)     Point: ADL training (Done)     Description:   Instruct learner(s) on proper safety adaptation and remediation techniques during self care or transfers.   Instruct in proper use of assistive devices.              Learning Progress Summary           Patient Acceptance, E,TB, VU by SD at 6/7/2022 1646    Comment: Breathing techniques during ADLs.      Show all documentation for this point (4)                 Point: Home exercise program  (Done)     Description:   Instruct learner(s) on appropriate technique for monitoring, assisting and/or progressing therapeutic exercises/activities.              Learning Progress Summary           Patient Acceptance, E,TB, VU by SD at 6/1/2022 1306    Comment: Benefit of OT; OT POC                   Point: Precautions (Done)     Description:   Instruct learner(s) on prescribed precautions during self-care and functional transfers.              Learning Progress Summary           Patient Acceptance, E,TB, VU by SD at 6/1/2022 1306    Comment: Benefit of OT; OT POC                   Point: Body mechanics (Done)     Description:   Instruct learner(s) on proper positioning and spine alignment during self-care, functional mobility activities and/or exercises.              Learning Progress Summary           Patient Acceptance, E,TB, VU by SD at 6/1/2022 1306    Comment: Benefit of OT; OT POC                               User Key     Initials Effective Dates Name Provider Type Discipline    SD 06/16/21 -  Jenifer Natarajan OT Occupational Therapist OT              OT Recommendation and Plan  Planned Therapy Interventions (OT): activity tolerance training, adaptive equipment training, BADL retraining, patient/caregiver education/training, ROM/therapeutic exercise, strengthening exercise, transfer/mobility retraining  Therapy Frequency (OT): 3 times/wk (5 times if indicated)  Plan of Care Review  Plan of Care Reviewed With: patient  Progress: improving  Outcome Evaluation: OT tx completed. Patient completed all bed mobility with SBA, completed hair care, washing face and oral hygiene with SBA, therapist assisted patient in shaving face sitting up in bed. Patient able to long sit in bed and demonstrate improved breathing techniques with verbal cues. Further activity held d/t fatigue. O2 on 5.5L ranged 96, 89, 95 before, during, after activity. Continue OT POC     Time Calculation:    Time Calculation- OT     Row Name  06/07/22 1647             Time Calculation- OT    OT Start Time 1436  -SD      OT Stop Time 1515  -SD      OT Time Calculation (min) 39 min  -SD      OT Received On 06/07/22  -SD      OT Goal Re-Cert Due Date 06/13/22  -SD              Timed Charges    46273 - OT Self Care/Mgmt Minutes 25  -SD              Total Minutes    Timed Charges Total Minutes 25  -SD       Total Minutes 25  -SD            User Key  (r) = Recorded By, (t) = Taken By, (c) = Cosigned By    Initials Name Provider Type    SD Jenifer Natarajan, OT Occupational Therapist              Therapy Charges for Today     Code Description Service Date Service Provider Modifiers Qty    91107976115 HC OT SELF CARE/MGMT/TRAIN EA 15 MIN 6/6/2022 Jenifer Natarajan OT GO 2    17322044662 HC OT SELF CARE/MGMT/TRAIN EA 15 MIN 6/7/2022 Jenifer Natarajan OT GO 2               Jenifer Natarajan OT  6/7/2022

## 2022-06-07 NOTE — PLAN OF CARE
Goal Outcome Evaluation:  Plan of Care Reviewed With: (P) patient        Progress: (P) improving  Outcome Evaluation: (P) Pt participated well in PT re-evaluation. Pt briefs were soiled, so pt rolled to B sides with VC and SBA. Pt took frequent rest breaks due to O2 sats decreasing. Pt was instructed in huffing techniques and practiced resulting in increase in O2 sats. Pt was in long sitting for 5 min while continuing to practice huffing techniques. Pt was left with OT. Progress per POC and pt tolerance.

## 2022-06-07 NOTE — CASE MANAGEMENT/SOCIAL WORK
Discharge Planning Assessment   [facility]     Patient Name: Gene Mobley  MRN: [id number]  Today's Date: 6/7/2022    Admit Date: 5/25/2022     Discharge Needs Assessment    No documentation.                Discharge Plan     Row Name 06/07/22 1413       Plan    Plan Comments Kaila Leo R[place]  interested in patient  and states will start precert.  Called  Mr Mobley  and he is agreeable  to go to swing bed for   rehab.   Called Kaila  and informed   she will put in before her medical director   would prefer  his oxygen to be down to  6 liters.              Continued Care and Services - Admitted Since 5/25/2022     Destination     Service Provider Request Status Selected Services Address Phone Fax Patient Preferred    [facility]  Pending - Request Sent N/A [address] [facility] [id number] [phone] --    [facility]  Pending - Request Sent N/A [address] [facility] [id number] [phone] --    [facility]  Pending - Request Sent N/A [address] [facility] [id number] [phone] --    [facility]  Pending - Request Sent N/A [address] [facility] [phone] [phone] --    [facility]  Pending - Request Sent N/A [address] [facility] [phone] --                 Demographic Summary    No documentation.                Functional Status    No documentation.                Psychosocial    No documentation.                Abuse/Neglect    No documentation.                Legal    No documentation.                Substance Abuse    No documentation.                Patient Forms    No documentation.                   Veronique Chan RN

## 2022-06-07 NOTE — PLAN OF CARE
Goal Outcome Evaluation:  Plan of Care Reviewed With: patient        Progress: improving   VS noted, patient is currently on 6L NC oxygen stats currently 96%. Patient is still experiencing a drop in oxygen stats during activity. PT/OT working with patient, referral to swing bed made by ANAMARIA.

## 2022-06-07 NOTE — PLAN OF CARE
Goal Outcome Evaluation:  Plan of Care Reviewed With: patient        Progress: improving  Outcome Evaluation: OT tx completed. Patient completed all bed mobility with SBA, completed hair care, washing face and oral hygiene with SBA, therapist assisted patient in shaving face sitting up in bed. Patient able to long sit in bed and demonstrate improved breathing techniques with verbal cues. Further activity held d/t fatigue. O2 on 5.5L ranged 96, 89, 95 before, during, after activity. Continue OT POC

## 2022-06-07 NOTE — DISCHARGE PLACEMENT REQUEST
"Gene Maguire (89 y.o. Male)             Date of Birth   1933    Social Security Number       Address   [address] [place] [id number]    Home Phone   [phone]    MRN   [id number]       Pentecostalism   None    Marital Status                               Admission Date   22    Admission Type   Emergency    Admitting Provider   Abdoulaye Alcantara MD    Attending Provider   Abdoulaye Alcantara MD    Department, Room/Bed   [facility] MED SURG  3, 326/1       Discharge Date       Discharge Disposition       Discharge Destination                               Attending Provider: Abdoulaye Alcantara MD    Allergies: No Known Allergies    Isolation: Enh Drop/Con   Infection: COVID (confirmed) (22)   Code Status: No CPR   Advance Care Planning Activity    Ht: 175.3 cm (69.02\")   Wt: 94.6 kg (208 lb 8.9 oz)    Admission Cmt: None   Principal Problem: Acute respiratory failure with hypoxia (HCC) [J96.01]                 Active Insurance as of 2022     Primary Coverage     Payor Plan Insurance Group Employer/Plan Group    HUMANA MEDICARE REPLACEMENT HUMANA MEDICARE REPLACEMENT [id number]     Payor Plan Address Payor Plan Phone Number Payor Plan Fax Number Effective Dates    [address] [address] [phone]  2018 - None Entered    [facility] [id number]       Subscriber Name Subscriber Birth Date Member ID       GENE MAGUIRE 3/14/1933 [id number]                 Emergency Contacts      (Rel.) Home Phone Work Phone Mobile Phone    DARLIN MAGUIRE (Spouse) [phone] -- --    BRANDY [phone] --               History & Physical      Matthew Cunningham MD at [facility]            [facility] HOSPITALIST   HISTORY AND PHYSICAL      Name:  Gene Maguire   Age:  89 y.o.  Sex:  male  :  3/14/1933  MRN:  [id number]   Visit Number:  [id number]  Admission Date:  2022  Date Of Service:  22  Primary Care Physician:  Provider, No Known    Chief Complaint: " "    Shortness of breath x 2 weeks    History Of Presenting Illness:      Mr. Mobley is an 89 year old  male with a past medical history of hypertension, type 2 diabetes mellitus and a \"problem with the pancreas\" who presented to the ED with a chief complaint of gradually worsening shortness of breath x 2 weeks. He has also had a dry cough and nausea along with intermittent fevers (highest recorded fever was 100.4 degrees F). He was diagnosed with COVID-19 approximately 10 days ago by his primary care provider and since then has been taking an antibiotic twice daily, the name of which he does not remember. He has not been taking steroids. He saw his primary care provider earlier today who diagnosed him with a pneumonia and prompted him to go to the ED. He has not been vaccinated against COVID-19. He has had very poor appetite and oral intake. Creatinine level is 1.49 (no baseline). BUN is 24. ABG is 7.47/31/56 on RA. , ferritin 1497, crp 17, d-dimer 0.64, procal 0.14. CTA chest  PE protocol revealed a viral pneumonia and ruled out a pulmonary embolism. He denies having had chest pain, diarrhea or dysuria.     Initial vitals from the ED:  T 99.1 degrees F - P 69 - RR 19 - /69 - SpO2 92% RA    Studies from the ED:  ABG 7.47/31/56 on RA  Troponin T <0.01, ProBNP 194  , ferritin 1497, crp 17, d-dimer 0.64, procal 0.14  Na 134, K 4.4, Cr 1.49 (no baseline)  Wbc 8.6, hgb 12.2, platelets 249  EKG: NSR HR 72 RBBB  CTA chest  PE protocol - Viral pneumonia. No pulmonary embolism.  Gallstones.    ED Medications: NS bolus x 500mL, Rocephin 1g, Azithromycin IV 500mg    Review Of Systems:    All systems were reviewed and negative except as mentioned in history of presenting illness, assessment and plan.    Past Medical History: Patient  has a past medical history of Diabetes mellitus (HCC) and Hypertension.    Past Surgical History: Patient  has a past surgical history that includes Hernia " "repair.    Social History: Patient  reports that he has quit smoking. He does not have any smokeless tobacco history on file. He reports that he does not drink alcohol and does not use drugs. He lives with his wife and at baseline ambulates with a walker.    Family History: Patient's family history is not on file.    Allergies:      Patient has no known allergies.    Home Medications:    Prior to Admission Medications     None          Vital Signs:  Temp:  [99.1 °F (37.3 °C)] 99.1 °F (37.3 °C)  Heart Rate:  [69-79] 76  Resp:  [18-20] 18  BP: (136-171)/(69-84) 167/72        05/25/22  1737   Weight: 102 kg (225 lb)     Body mass index is 33.23 kg/m².    Physical Exam:     Most recent vital Signs: /72   Pulse 76   Temp 99.1 °F (37.3 °C) (Oral)   Resp 18   Ht 175.3 cm (69\")   Wt 102 kg (225 lb)   SpO2 94%   BMI 33.23 kg/m²     General Appearance:  Alert and cooperative. NAD.   Head:  Atraumatic and normocephalic.   Eyes: Conjunctivae and sclerae normal, no icterus. No pallor.   Throat: No oral lesions, oral mucosa moist.   Neck: Supple, trachea midline.   Lungs:   Breath sounds heard bilaterally equally.  No wheezing or crackles. Tachypnea. +use of accessory muscles.   Heart:  Normal S1 and S2, no murmur, no gallop, no rub.    Abdomen:   Soft, nontender, nondistended, no rebound tenderness.   Extremities: Supple, no edema, no cyanosis.   Skin: No bleeding or rash.   Neurologic: No facial asymmetry. Moves all four limbs. No tremors.      Laboratory data:    I have reviewed the labs done in the emergency room.    Results from last 7 days   Lab Units 05/25/22  1850   SODIUM mmol/L 134*   POTASSIUM mmol/L 4.4   CHLORIDE mmol/L 101   CO2 mmol/L 21.5*   BUN mg/dL 24*   CREATININE mg/dL 1.49*   CALCIUM mg/dL 8.6   BILIRUBIN mg/dL 0.5   ALK PHOS U/L 69   ALT (SGPT) U/L 35   AST (SGOT) U/L 43*   GLUCOSE mg/dL 166*     Results from last 7 days   Lab Units 05/25/22  1850   WBC 10*3/mm3 8.63   HEMOGLOBIN g/dL 12.2* "   HEMATOCRIT % 36.7*   PLATELETS 10*3/mm3 249         Results from last 7 days   Lab Units 05/25/22  1850   TROPONIN T ng/mL <0.010     Results from last 7 days   Lab Units 05/25/22  1850   PROBNP pg/mL 194.2             Results from last 7 days   Lab Units 05/25/22  1917   PH, ARTERIAL pH units 7.472*   PO2 ART mm Hg 56.0*   PCO2, ARTERIAL mm Hg 31.1*   HCO3 ART mmol/L 22.7           Invalid input(s): USDES,  BLOODU, NITRITITE, BACT, EP    Pain Management Panel    There is no flowsheet data to display.         Radiology:    CT Angiogram Chest Pulmonary Embolism    Result Date: 5/25/2022  FINAL REPORT TECHNIQUE: Thin section axial images were obtained through the chest and during the arterial phase of IV contrast administration. Coronal 3-D MIP reconstructed images were also provided. CLINICAL HISTORY: recent covid soa FINDINGS: The pulmonary arteries are well-opacified and there is no filling defect to indicate pulmonary embolism. The thoracic aorta is normal. There are multifocal bilateral patchy areas of groundglass/airspace opacity consistent with pneumonia, including viral pneumonia. There is no pleural disease, adenopathy or significant osseous abnormality.  In the upper abdomen, there are multiple dependent gallstones in an otherwise normal gallbladder.     Viral pneumonia. No pulmonary embolism.  Gallstones. Authenticated by James Pedraza M.D. on 05/25/2022 10:29:36 PM      Assessment/Plan:    Acute respiratory failure without hypoxia  -Secondary to COVID-19 pneumonia  -Will monitor on continuous pulse oximetry.  -Will provide supplemental oxygen as needed to maintain SpO2 >/=90%.  -The patient has already completed almost 2 weeks of an antibiotic, the name of which he does not remember. He also received Rocephin and azithromycin in the ED. I will hold off on starting an antibiotic.     Acute kidney injury  -Creatinine level is 1.49 (no baseline). BUN is 24.   -Likely secondary to decreased oral intake.  -The  patient received NS x 500mL in the ED. Will start NS @ 100mL/hr.  -Will recheck Cr in the morning.    Type 2 diabetes mellitus  -Will order a hemoglobin a1c level.  -Start POC glucose TID AC  -Start sliding scale, low dose.    The patient's wife will provide a home medication list in the morning.     Risk Assessment: Moderate to High  DVT Prophylaxis: Heparin subcu  Code Status: DNR/DNI  Diet: Cardiac/Consistent Carb      Matthew Cunningham MD  05/25/22  23:34 EDT    Electronically signed by Matthew Cunningham MD at 05/26/22 0615         Current Facility-Administered Medications   Medication Dose Route Frequency Provider Last Rate Last Admin   • acetaminophen (TYLENOL) tablet 650 mg  650 mg Oral Q4H PRN Matthew Cunningham MD   650 mg at 05/29/22 0032   • albuterol sulfate HFA (PROVENTIL HFA;VENTOLIN HFA;PROAIR HFA) inhaler 2 puff  2 puff Inhalation 4x Daily - RT Matthew Cunningham MD   2 puff at 06/07/22 1130   • ALPRAZolam (XANAX) tablet 0.5 mg  0.5 mg Oral Q12H PRN Brayden Fischer MD   0.5 mg at 06/06/22 1412   • amLODIPine (NORVASC) tablet 10 mg  10 mg Oral Daily Abdoulaye Alcantara MD   10 mg at 06/07/22 0835   • ascorbic acid (VITAMIN C) tablet 500 mg  500 mg Oral Daily Abdoulaye Alcantara MD   500 mg at 06/07/22 0836   • budesonide-formoterol (SYMBICORT) 160-4.5 MCG/ACT inhaler 2 puff  2 puff Inhalation BID - RT Werner Ace MD   2 puff at 06/07/22 0836   • carvedilol (COREG) tablet 12.5 mg  12.5 mg Oral BID With Meals Abdoulaye Alcantara MD   12.5 mg at 06/07/22 0836   • dexamethasone (DECADRON) IVPB 10 mg  10 mg Intravenous Daily Abdoulaye Alcantara MD   10 mg at 06/07/22 0836   • dextrose (D50W) (25 g/50 mL) IV injection 25 g  25 g Intravenous Q15 Min PRN Matthew Cunningham MD       • dextrose (GLUTOSE) oral gel 1 tube  1 tube Oral Q15 Min PRN Matthew Cunningham MD       • glucagon (human recombinant) (GLUCAGEN DIAGNOSTIC) injection 1 mg  1 mg Subcutaneous PRN Matthew Cunningham MD       • heparin (porcine) 5000 UNIT/ML injection 5,000 Units  5,000 Units Subcutaneous Q12H Octavio  MD Matthew   5,000 Units at 06/07/22 0836   • hydrALAZINE (APRESOLINE) injection 10 mg  10 mg Intravenous Q6H PRN Matthew Cunningham MD   10 mg at 05/29/22 0958   • Insulin Aspart (novoLOG) injection 0-7 Units  0-7 Units Subcutaneous TID AC Matthew Cunningham MD   4 Units at 06/07/22 1129   • insulin detemir (LEVEMIR) injection 20 Units  20 Units Subcutaneous Q12H Brayden Fischer MD   20 Units at 06/07/22 0836   • losartan (COZAAR) tablet 100 mg  100 mg Oral Daily Brayden Fischer MD   100 mg at 06/07/22 0835   • melatonin tablet 5 mg  5 mg Oral Nightly PRN Matthew Cunningham MD   5 mg at 05/29/22 2021   • multivitamin with minerals 1 tablet  1 tablet Oral Daily Abdoulaye Alcantara MD   1 tablet at 06/07/22 0835   • ondansetron (ZOFRAN) tablet 4 mg  4 mg Oral Q6H PRN Matthew Cunningham MD        Or   • ondansetron (ZOFRAN) injection 4 mg  4 mg Intravenous Q6H PRN Matthew Cunningham MD       • sodium chloride 0.9 % flush 10 mL  10 mL Intravenous Q12H Matthew Cunningham MD   10 mL at 06/07/22 0836   • sodium chloride 0.9 % flush 10 mL  10 mL Intravenous PRN Matthew Cunningham MD   10 mL at 05/31/22 1141   • tamsulosin (FLOMAX) 24 hr capsule 0.4 mg  0.4 mg Oral Nightly Abdoulaye Alcantara MD   0.4 mg at 06/06/22 2017     Physician Progress Notes (last 24 hours)  Notes from 06/06/22 1307 through 06/07/22 1307   No notes of this type exist for this encounter.            Physical Therapy Notes (last 24 hours)      Mekhi Caceres, PTA at 06/06/22 1540  Version 1 of 1       Goal Outcome Evaluation:  Plan of Care Reviewed With: patient        Progress: improving  Outcome Evaluation: Pt recieved supine in bed and willing to participate with treatment. Pt with 8 lpm pnc at 93% with mobility in bed pt was noted to have increased difficulty with breathing and was titrated up to 10L subsequently having to be increased to 15L to miantain O2 at or above 90%.  Pt performed sts from EOB with rw with min a and was able to maintain for approx 30 seconds. Pt was returned to EOB with rest period of  approx 5 minutes required. Pt performed lateral scoot x 2 min a to return pt to appropriate position in bed.  Pt was able to return to supine with cga.  Pt required NRBM at 15 L as well as HFNC at 15 L for recover. Pt rebounded to at one point 97% and NFNC was slowly titrated back down to 10L and then to 8L. NRBM was removed and pt was able to maintain O2 saturation of 92-93% in semireclined position.  See flowsheet for details    Electronically signed by Mekhi Caceres, PTA at 22 1540     Mekhi Caceres, PTA at 22 1542  Version 1 of 1         Patient Name: Gene Mobley  : 3/14/1933    MRN: [id number]                              Today's Date: 2022       Admit Date: 2022    Visit Dx:     ICD-10-CM ICD-9-CM   1. Pneumonia due to COVID-19 virus  U07.1 480.8    J12.82 079.89   2. Hypoxia  R09.02 799.02   3. GOMEZ (acute kidney injury) (HCC)  N17.9 584.9     Patient Active Problem List   Diagnosis   • Pneumonia due to COVID-19 virus   • GOMEZ (acute kidney injury) (HCC)   • Acute respiratory failure with hypoxia (HCC)   • Essential hypertension     Past Medical History:   Diagnosis Date   • Diabetes mellitus (HCC)    • Hypertension    • Impaired functional mobility, balance, gait, and endurance      Past Surgical History:   Procedure Laterality Date   • HERNIA REPAIR        General Information     Row Name 22 1526          Physical Therapy Time and Intention    Document Type therapy note (daily note)  -RM     Mode of Treatment physical therapy  -RM     Row Name 22 1526          General Information    Patient Profile Reviewed yes  -RM     Existing Precautions/Restrictions fall;oxygen therapy device and L/min  -RM     Row Name 22 1526          Cognition    Orientation Status (Cognition) oriented x 4  -RM     Row Name 22 1526          Safety Issues, Functional Mobility    Safety Issues Affecting Function (Mobility) safety precautions follow-through/compliance;safety  precaution awareness;sequencing abilities  -RM     Impairments Affecting Function (Mobility) balance;endurance/activity tolerance;shortness of breath;strength  -RM           User Key  (r) = Recorded By, (t) = Taken By, (c) = Cosigned By    Initials Name Provider Type    Mekhi Garza, HAYLIE Physical Therapist Assistant               Mobility     Row Name 06/06/22 1527          Bed Mobility    Rolling Left [place] (Bed Mobility) standby assist;verbal cues  -RM     Rolling Right [place] (Bed Mobility) standby assist;verbal cues  -RM     Scooting/Bridging [place] (Bed Mobility) standby assist  -RM     Supine-Sit [place] (Bed Mobility) standby assist;contact guard;verbal cues  -RM     Sit-Supine [place] (Bed Mobility) standby assist  -RM     Assistive Device (Bed Mobility) bed rails;head of bed elevated  -RM     Comment, (Bed Mobility) maintained long sit for approx 8 minutes  -RM     Row Name 06/06/22 1527          Sit-Stand Transfer    Sit-Stand [place] (Transfers) minimum assist (75% patient effort)  -RM     Assistive Device (Sit-Stand Transfers) walker, front-wheeled  -RM     Comment, (Sit-Stand Transfer) sit to stand x 1 with rw and maintained for approx 5 minutes.  -RM           User Key  (r) = Recorded By, (t) = Taken By, (c) = Cosigned By    Initials Name Provider Type    Mekhi Garza, HAYLIE Physical Therapist Assistant               Obj/Interventions    No documentation.                Goals/Plan    No documentation.                Clinical Impression     Row Name 06/06/22 1529          Pain    Pretreatment Pain Rating 0/10 - no pain  -RM     Posttreatment Pain Rating 0/10 - no pain  -RM     Row Name 06/06/22 1529          Plan of Care Review    Plan of Care Reviewed With patient  -RM     Progress improving  -RM     Outcome Evaluation Pt recieved supine in bed and willing to participate with treatment. Pt with 8 lpm pnc at 93% with mobility in bed pt was noted to have  increased difficulty with breathing and was titrated up to 10L subsequently having to be increased to 15L to miantain O2 at or above 90%.  Pt performed sts from EOB with rw with min a and was able to maintain for approx 30 seconds. Pt was returned to EOB with rest period of approx 5 minutes required. Pt performed lateral scoot x 2 min a to return pt to appropriate position in bed.  Pt was able to return to supine with cga.  Pt required NRBM at 15 L as well as HFNC at 15 L for recover. Pt rebounded to at one point 97% and NFNC was slowly titrated back down to 10L and then to 8L. NRBM was removed and pt was able to maintain O2 saturation of 92-93% in semireclined position.  See flowsheet for details  -RM     Row Name 06/06/22 1529          Vital Signs    Pre SpO2 (%) 93  -RM     O2 Delivery Pre Treatment hi-flow  8L  -RM     Intra SpO2 (%) 89  -RM     O2 Delivery Intra Treatment hi-flow  HFNC and NRBM at 15L  -RM     Post SpO2 (%) 93  -RM     O2 Delivery Post Treatment hi-flow  8L  -RM     Pre Patient Position Supine  -RM     Intra Patient Position Standing  -RM     Post Patient Position Supine  -RM     Row Name 06/06/22 1529          Positioning and Restraints    Pre-Treatment Position in bed  -RM     Post Treatment Position bed  -RM     In Bed fowlers;call light within reach;encouraged to call for assist;notified nsg  -RM           User Key  (r) = Recorded By, (t) = Taken By, (c) = Cosigned By    Initials Name Provider Type     Mekhi Caceres, PTA Physical Therapist Assistant               Outcome Measures     Row Name 06/06/22 1539          How much help from another person do you currently need...    Turning from your back to your side while in flat bed without using bedrails? 4  -RM     Moving from lying on back to sitting on the side of a flat bed without bedrails? 4  -RM     Moving to and from a bed to a chair (including a wheelchair)? 2  -RM     Standing up from a chair using your arms (e.g., wheelchair,  bedside chair)? 3  -RM     Climbing 3-5 steps with a railing? 1  -RM     To walk in hospital room? 2  -RM     AM-PAC 6 Clicks Score (PT) 16  -RM     Highest level of mobility 5 --> Static standing  -RM     Row Name 06/06/22 1539 06/06/22 1519       Functional Assessment    Outcome Measure Options AM-PAC 6 Clicks Basic Mobility (PT)  -RM AM-PAC 6 Clicks Daily Activity (OT)  -SD          User Key  (r) = Recorded By, (t) = Taken By, (c) = Cosigned By    Initials Name Provider Type     Mekhi Caceres, PTA Physical Therapist Assistant    Jenifer Castle, OT Occupational Therapist                             Physical Therapy Education                 Title: PT OT SLP Therapies (In Progress)     Topic: Physical Therapy (In Progress)     Point: Mobility training (Done)     Learning Progress Summary           Patient Acceptance, E,TB,D, VU,NR by  at 6/6/2022 1540    Comment: Pursed lip breathing with activity      Show all documentation for this point (4)                 Point: Home exercise program (Not Started)     Learner Progress:  Not documented in this visit.          Point: Body mechanics (Not Started)     Learner Progress:  Not documented in this visit.          Point: Precautions (Done)     Learning Progress Summary           Patient Acceptance, E,TB, VU by  at 5/30/2022 1436    Comment: importance of not over excerting and maintain O2 levels >90%                               User Key     Initials Effective Dates Name Provider Type Discipline     06/16/21 -  Charity Daily, PTA Physical Therapist Assistant PT     06/16/21 -  Mekhi Caceres, PTA Physical Therapist Assistant PT              PT Recommendation and Plan     Plan of Care Reviewed With: patient  Progress: improving  Outcome Evaluation: Pt recieved supine in bed and willing to participate with treatment. Pt with 8 lpm pnc at 93% with mobility in bed pt was noted to have increased difficulty with breathing and was titrated up to 10L  subsequently having to be increased to 15L to miantain O2 at or above 90%.  Pt performed sts from EOB with rw with min a and was able to maintain for approx 30 seconds. Pt was returned to EOB with rest period of approx 5 minutes required. Pt performed lateral scoot x 2 min a to return pt to appropriate position in bed.  Pt was able to return to supine with cga.  Pt required NRBM at 15 L as well as HFNC at 15 L for recover. Pt rebounded to at one point 97% and NFNC was slowly titrated back down to 10L and then to 8L. NRBM was removed and pt was able to maintain O2 saturation of 92-93% in semireclined position.  See flowsheet for details     Time Calculation:    PT Charges     Row Name 06/06/22 1541             Time Calculation    Start Time 1320  -RM      Stop Time 1404  -RM      Time Calculation (min) 44 min  -RM      PT Received On 06/06/22  -RM      PT Goal Re-Cert Due Date 06/07/22  -RM              Time Calculation- PT    Total Timed Code Minutes- PT 44 minute(s)  -RM              Timed Charges    46348 - PT Therapeutic Exercise Minutes 14  -RM      04348 - PT Therapeutic Activity Minutes 30  -RM              Total Minutes    Timed Charges Total Minutes 44  -RM       Total Minutes 44  -RM            User Key  (r) = Recorded By, (t) = Taken By, (c) = Cosigned By    Initials Name Provider Type    Mekhi Garza PTA Physical Therapist Assistant              Therapy Charges for Today     Code Description Service Date Service Provider Modifiers Qty    25783846740 HC PT THER PROC EA 15 MIN 6/6/2022 Mekhi Caceres, PTA GP 1    07958875522 HC PT THERAPEUTIC ACT EA 15 MIN 6/6/2022 Mekhi Caceres, PTA GP 2          PT G-Codes  Outcome Measure Options: AM-PAC 6 Clicks Basic Mobility (PT)  AM-PAC 6 Clicks Score (PT): 16  AM-PAC 6 Clicks Score (OT): 15    Mekhi Caceres PTA  6/6/2022      Electronically signed by Mekhi Caceres PTA at 06/06/22 1542          Occupational Therapy Notes (last 24 hours)       Jenifer Natarajan OT at 22 1520          Patient Name: Gene Mobley  : 3/14/1933    MRN: [id number]                              Today's Date: 2022       Admit Date: 2022    Visit Dx:     ICD-10-CM ICD-9-CM   1. Pneumonia due to COVID-19 virus  U07.1 480.8    J12.82 079.89   2. Hypoxia  R09.02 799.02   3. GOMEZ (acute kidney injury) (HCC)  N17.9 584.9     Patient Active Problem List   Diagnosis   • Pneumonia due to COVID-19 virus   • GOMEZ (acute kidney injury) (HCC)   • Acute respiratory failure with hypoxia (HCC)   • Essential hypertension     Past Medical History:   Diagnosis Date   • Diabetes mellitus (HCC)    • Hypertension    • Impaired functional mobility, balance, gait, and endurance      Past Surgical History:   Procedure Laterality Date   • HERNIA REPAIR        General Information     Row Name 22 1509          OT Time and Intention    Document Type therapy note (daily note)  -SD     Mode of Treatment occupational therapy  -SD     Row Name 22 1509          General Information    Patient Profile Reviewed yes  -SD           User Key  (r) = Recorded By, (t) = Taken By, (c) = Cosigned By    Initials Name Provider Type    SD Jenifer Natarajan OT Occupational Therapist                 Mobility/ADL's     Row Name 22 1509          Bed Mobility    Bed Mobility rolling left;rolling right;supine-sit;sit-supine  -SD     Rolling Left [place] (Bed Mobility) standby assist;verbal cues  -SD     Rolling Right [place] (Bed Mobility) standby assist;verbal cues  -SD     Supine-Sit [place] (Bed Mobility) contact guard  -SD     Sit-Supine [place] (Bed Mobility) contact guard  -SD     Assistive Device (Bed Mobility) bed rails;head of bed elevated  -SD     Comment, (Bed Mobility) completed brief change, perineal hygiene in bed  -SD     Row Name 22 1509          Transfers    Transfers sit-stand transfer  -SD     Sit-Stand [place] (Transfers) minimum assist (75%  patient effort)  -SD     Row Name 06/06/22 1509          Sit-Stand Transfer    Assistive Device (Sit-Stand Transfers) walker, front-wheeled  -SD     Comment, (Sit-Stand Transfer) sit to stand x 1  -SD     Row Name 06/06/22 1509          Bathing Assessment/Intervention    [place] Level (Bathing) distal lower extremities/feet;perineal area;maximum assist (25% patient effort);upper body;upper extremities;chest/trunk;minimum assist (75% patient effort)  -SD     Position (Bathing) edge of bed sitting;supine  -SD     Row Name 06/06/22 1509          Upper Body Dressing Assessment/Training    [place] Level (Upper Body Dressing) don;pajama/robe;minimum assist (75% patient effort)  -SD     Position (Upper Body Dressing) edge of bed sitting  -SD     Row Name 06/06/22 1509          Grooming Assessment/Training    [place] Level (Grooming) hair care, combing/brushing;wash face, hands  -SD     Position (Grooming) edge of bed sitting  -SD           User Key  (r) = Recorded By, (t) = Taken By, (c) = Cosigned By    Initials Name Provider Type    Jenifer Castle OT Occupational Therapist               Obj/Interventions    No documentation.                Goals/Plan    No documentation.                Clinical Impression     Row Name 06/06/22 1513          Pain Assessment    Pretreatment Pain Rating 0/10 - no pain  -SD     Posttreatment Pain Rating 0/10 - no pain  -SD     Row Name 06/06/22 1513          Plan of Care Review    Plan of Care Reviewed With patient  -SD     Progress no change  -SD     Outcome Evaluation OT tx completed. Patient supine in bed satting 93% on 8L HF NC. Patient completed rolling left to right for LB/perineal bathing and depend change. Patient desatted to 89% and was increased from 8L to 10L and eventually 15L. Patient moved to EOB with CGA, completed sit to stand with min A using RW. C/O increased fatigue at which time patient returned to supine and noteable increase of accessory muscle  recruitment.  NRB was added for recovery, once sats stabilized patient titrated back down to 8L HF NC. Notified RN patient may benefit from anxiety medicine to aid in proper breathing techniques during activity. Continue OT POC  -SD     Row Name 06/06/22 1513          Vital Signs    Pre SpO2 (%) 93  -SD     O2 Delivery Pre Treatment hi-flow  8L  -SD     Intra SpO2 (%) 89  -SD     O2 Delivery Intra Treatment non-rebreather  15L  -SD     Post SpO2 (%) 93  -SD     O2 Delivery Post Treatment hi-flow  8L  -SD     Row Name 06/06/22 1513          Positioning and Restraints    Pre-Treatment Position in bed  -SD     Post Treatment Position bed  -SD     In Bed supine;call light within reach;encouraged to call for assist;notified nsg  -SD           User Key  (r) = Recorded By, (t) = Taken By, (c) = Cosigned By    Initials Name Provider Type    Jenifer Castle OT Occupational Therapist               Outcome Measures     Row Name 06/06/22 1519          How much help from another is currently needed...    Putting on and taking off regular lower body clothing? 2  -SD     Bathing (including washing, rinsing, and drying) 2  -SD     Toileting (which includes using toilet bed pan or urinal) 2  -SD     Putting on and taking off regular upper body clothing 3  -SD     Taking care of personal grooming (such as brushing teeth) 3  -SD     Eating meals 3  -SD     AM-PAC 6 Clicks Score (OT) 15  -SD     Row Name 06/06/22 1519          Functional Assessment    Outcome Measure Options AM-PAC 6 Clicks Daily Activity (OT)  -SD           User Key  (r) = Recorded By, (t) = Taken By, (c) = Cosigned By    Initials Name Provider Type    Jenifer Castle OT Occupational Therapist                Occupational Therapy Education                 Title: PT OT SLP Therapies (In Progress)     Topic: Occupational Therapy (Done)     Point: ADL training (Done)     Description:   Instruct learner(s) on proper safety adaptation and remediation techniques  during self care or transfers.   Instruct in proper use of assistive devices.              Learning Progress Summary           Patient Acceptance, E,TB, VU by SD at 6/6/2022 1519    Comment: Safety and sequencing during functional tf      Show all documentation for this point (3)                 Point: Home exercise program (Done)     Description:   Instruct learner(s) on appropriate technique for monitoring, assisting and/or progressing therapeutic exercises/activities.              Learning Progress Summary           Patient Acceptance, E,TB, VU by SD at 6/1/2022 1306    Comment: Benefit of OT; OT POC                   Point: Precautions (Done)     Description:   Instruct learner(s) on prescribed precautions during self-care and functional transfers.              Learning Progress Summary           Patient Acceptance, E,TB, VU by SD at 6/1/2022 1306    Comment: Benefit of OT; OT POC                   Point: Body mechanics (Done)     Description:   Instruct learner(s) on proper positioning and spine alignment during self-care, functional mobility activities and/or exercises.              Learning Progress Summary           Patient Acceptance, E,TB, VU by SD at 6/1/2022 1306    Comment: Benefit of OT; OT POC                               User Key     Initials Effective Dates Name Provider Type Discipline    SD 06/16/21 -  Jenifer Natarajan OT Occupational Therapist OT              OT Recommendation and Plan  Planned Therapy Interventions (OT): activity tolerance training, adaptive equipment training, BADL retraining, patient/caregiver education/training, ROM/therapeutic exercise, strengthening exercise, transfer/mobility retraining  Therapy Frequency (OT): 3 times/wk (5 times if indicated)  Plan of Care Review  Plan of Care Reviewed With: patient  Progress: no change  Outcome Evaluation: OT tx completed. Patient supine in bed satting 93% on 8L HF NC. Patient completed rolling left to right for LB/perineal bathing  and depend change. Patient desatted to 89% and was increased from 8L to 10L and eventually 15L. Patient moved to EOB with CGA, completed sit to stand with min A using RW. C/O increased fatigue at which time patient returned to supine and noteable increase of accessory muscle recruitment.  NRB was added for recovery, once sats stabilized patient titrated back down to 8L HF NC. Notified RN patient may benefit from anxiety medicine to aid in proper breathing techniques during activity. Continue OT POC     Time Calculation:    Time Calculation- OT     Row Name 06/06/22 1519             Time Calculation- OT    OT Start Time 1321  -SD      OT Stop Time 1403  -SD      OT Time Calculation (min) 42 min  -SD      OT Received On 06/06/22  -SD      OT Goal Re-Cert Due Date 06/13/22  -SD              Timed Charges    37293 - OT Self Care/Mgmt Minutes 23  -SD              Total Minutes    Timed Charges Total Minutes 23  -SD       Total Minutes 23  -SD            User Key  (r) = Recorded By, (t) = Taken By, (c) = Cosigned By    Initials Name Provider Type    SD Jenifer Natarajan OT Occupational Therapist              Therapy Charges for Today     Code Description Service Date Service Provider Modifiers Qty    32830792930 HC OT SELF CARE/MGMT/TRAIN EA 15 MIN 6/6/2022 Jenifer Natarajan OT GO 2               Jenifer Natarajan OT  6/6/2022    Electronically signed by Jenifer Natarajan OT at 06/06/22 1520     Jenifer Natarajan OT at 06/06/22 1519        Goal Outcome Evaluation:  Plan of Care Reviewed With: patient        Progress: no change  Outcome Evaluation: OT tx completed. Patient supine in bed satting 93% on 8L HF NC. Patient completed rolling left to right for LB/perineal bathing and depend change. Patient desatted to 89% and was increased from 8L to 10L and eventually 15L. Patient moved to EOB with CGA, completed sit to stand with min A using RW. C/O increased fatigue at which time patient returned to supine and noteable  increase of accessory muscle recruitment.  NRB was added for recovery, once sats stabilized patient titrated back down to 8L HF NC. Notified RN patient may benefit from anxiety medicine to aid in proper breathing techniques during activity. Continue OT POC    Electronically signed by Jenifer Natarajan OT at 06/06/22 7977

## 2022-06-08 NOTE — PLAN OF CARE
Goal Outcome Evaluation:  Plan of Care Reviewed With: patient        Progress: improving  Outcome Evaluation: Pt recieved supine in bed and willing to participate with treatment.  Pt required min a to transfer to EOB and min/ mod a for transfer from bed to chair.  Pt requires assistance for positioning of rw with mobility as well as safety.  Pt was increased to 8L pnc for mobility with 87% and was able to be tritrated down to 6 L and 94-95% while reclined in sitting.  See flowsheet for details

## 2022-06-08 NOTE — THERAPY TREATMENT NOTE
Patient Name: Gene Mobley  : 3/14/1933    MRN: [id number]                              Today's Date: 2022       Admit Date: 2022    Visit Dx:     ICD-10-CM ICD-9-CM   1. Pneumonia due to COVID-19 virus  U07.1 480.8    J12.82 079.89   2. Hypoxia  R09.02 799.02   3. GOMEZ (acute kidney injury) (HCC)  N17.9 584.9     Patient Active Problem List   Diagnosis   • Pneumonia due to COVID-19 virus   • GOMEZ (acute kidney injury) (HCC)   • Acute respiratory failure with hypoxia (HCC)   • Essential hypertension     Past Medical History:   Diagnosis Date   • Diabetes mellitus (HCC)    • Hypertension    • Impaired functional mobility, balance, gait, and endurance      Past Surgical History:   Procedure Laterality Date   • HERNIA REPAIR        General Information     Row Name 22 154          Physical Therapy Time and Intention    Document Type therapy note (daily note)  -RM     Mode of Treatment physical therapy  -RM     Row Name 22 1542          General Information    Patient Profile Reviewed yes  -RM     Existing Precautions/Restrictions fall;oxygen therapy device and L/min  -RM     Row Name 22 1542          Cognition    Orientation Status (Cognition) oriented x 4  -RM     Row Name 22 1542          Safety Issues, Functional Mobility    Safety Issues Affecting Function (Mobility) safety precautions follow-through/compliance;safety precaution awareness;sequencing abilities;positioning of assistive device  -RM     Impairments Affecting Function (Mobility) balance;endurance/activity tolerance;shortness of breath;strength  -RM           User Key  (r) = Recorded By, (t) = Taken By, (c) = Cosigned By    Initials Name Provider Type    RM Mekhi Caceres, PTA Physical Therapist Assistant               Mobility     Row Name 22 1548          Bed Mobility    Supine-Sit [place] (Bed Mobility) contact guard;minimum assist (75% patient effort)  -RM     Assistive Device (Bed Mobility) bed  rails;head of bed elevated  -RM     Row Name 06/08/22 1543          Bed-Chair Transfer    Bed-Chair [place] (Transfers) minimum assist (75% patient effort)  -RM     Assistive Device (Bed-Chair Transfers) walker, front-wheeled  -RM     Row Name 06/08/22 1543          Sit-Stand Transfer    Sit-Stand [place] (Transfers) minimum assist (75% patient effort)  -RM     Assistive Device (Sit-Stand Transfers) walker, front-wheeled  -RM     Comment, (Sit-Stand Transfer) assistance for walker positioning  -RM           User Key  (r) = Recorded By, (t) = Taken By, (c) = Cosigned By    Initials Name Provider Type    RM Mekhi Caceres, PTA Physical Therapist Assistant               Obj/Interventions    No documentation.                Goals/Plan    No documentation.                Clinical Impression     Row Name 06/08/22 1544          Pain    Pretreatment Pain Rating 0/10 - no pain  -RM     Posttreatment Pain Rating 0/10 - no pain  -RM     Row Name 06/08/22 1544          Plan of Care Review    Plan of Care Reviewed With patient  -RM     Progress improving  -RM     Outcome Evaluation Pt recieved supine in bed and willing to participate with treatment.  Pt required min a to transfer to EOB and min/ mod a for transfer from bed to chair.  Pt requires assistance for positioning of rw with mobility as well as safety.  Pt was increased to 8L pnc for mobility with 87% and was able to be tritrated down to 6 L and 94-95% while reclined in sitting.  See flowsheet for details  -RM     Row Name 06/08/22 1544          Vital Signs    Pre SpO2 (%) 95  -RM     O2 Delivery Pre Treatment supplemental O2  3L  -RM     Intra SpO2 (%) 87  -RM     O2 Delivery Intra Treatment supplemental O2  8L.  -RM     Post SpO2 (%) 94  -RM     O2 Delivery Post Treatment supplemental O2  6L  -RM     Pre Patient Position Supine  -RM     Intra Patient Position Standing  -RM     Post Patient Position Sitting  -RM     Row Name 06/08/22 154           Positioning and Restraints    Pre-Treatment Position in bed  -RM     Post Treatment Position chair  -RM     In Chair reclined;call light within reach;encouraged to call for assist;notified nsg  -RM           User Key  (r) = Recorded By, (t) = Taken By, (c) = Cosigned By    Initials Name Provider Type     Mekhi Caceres, HAYLIE Physical Therapist Assistant               Outcome Measures     Row Name 06/08/22 1549          How much help from another person do you currently need...    Turning from your back to your side while in flat bed without using bedrails? 4  -RM     Moving from lying on back to sitting on the side of a flat bed without bedrails? 4  -RM     Moving to and from a bed to a chair (including a wheelchair)? 2  -RM     Standing up from a chair using your arms (e.g., wheelchair, bedside chair)? 3  -RM     Climbing 3-5 steps with a railing? 1  -RM     To walk in hospital room? 2  -RM     AM-PAC 6 Clicks Score (PT) 16  -RM     Highest level of mobility 5 --> Static standing  -RM     Row Name 06/08/22 1549 06/08/22 1430       Functional Assessment    Outcome Measure Options AM-PAC 6 Clicks Basic Mobility (PT)  -RM AM-PAC 6 Clicks Daily Activity (OT)  -SD          User Key  (r) = Recorded By, (t) = Taken By, (c) = Cosigned By    Initials Name Provider Type    Mekhi Garza, PTA Physical Therapist Assistant    Jenifer Castle, OT Occupational Therapist                             Physical Therapy Education                 Title: PT OT SLP Therapies (In Progress)     Topic: Physical Therapy (In Progress)     Point: Mobility training (Done)     Learning Progress Summary           Patient Acceptance, E,TB,D, VU,NR by  at 6/8/2022 4642    Comment: walker positioning and safety during mobility      Show all documentation for this point (6)                 Point: Home exercise program (Not Started)     Learner Progress:  Not documented in this visit.          Point: Body mechanics (Not Started)      Learner Progress:  Not documented in this visit.          Point: Precautions (Done)     Learning Progress Summary           Patient Acceptance, E,TB, VU by  at 5/30/2022 1436    Comment: importance of not over excerting and maintain O2 levels >90%                               User Key     Initials Effective Dates Name Provider Type Discipline     06/16/21 -  Charity Daily, HAYLIE Physical Therapist Assistant PT     06/16/21 -  Mekhi Caceres, HAYLIE Physical Therapist Assistant PT              PT Recommendation and Plan     Plan of Care Reviewed With: patient  Progress: improving  Outcome Evaluation: Pt recieved supine in bed and willing to participate with treatment.  Pt required min a to transfer to EOB and min/ mod a for transfer from bed to chair.  Pt requires assistance for positioning of rw with mobility as well as safety.  Pt was increased to 8L pnc for mobility with 87% and was able to be tritrated down to 6 L and 94-95% while reclined in sitting.  See flowsheet for details     Time Calculation:    PT Charges     Row Name 06/08/22 1550             Time Calculation    Start Time 1348  -RM      Stop Time 1423  -RM      Time Calculation (min) 35 min  -RM      PT Received On 06/08/22  -RM      PT Goal Re-Cert Due Date 06/17/22  -RM              Time Calculation- PT    Total Timed Code Minutes- PT 35 minute(s)  -RM              Timed Charges    23092 - PT Therapeutic Activity Minutes 35  -RM              Total Minutes    Timed Charges Total Minutes 35  -RM       Total Minutes 35  -RM            User Key  (r) = Recorded By, (t) = Taken By, (c) = Cosigned By    Initials Name Provider Type     Mekhi Caceres, HAYLIE Physical Therapist Assistant              Therapy Charges for Today     Code Description Service Date Service Provider Modifiers Qty    42677526630  PT THERAPEUTIC ACT EA 15 MIN 6/8/2022 Mekhi Caceres, HAYLIE GP 2          PT G-Codes  Outcome Measure Options: AM-PAC 6 Clicks Basic Mobility  (PT)  AM-PAC 6 Clicks Score (PT): 16  AM-PAC 6 Clicks Score (OT): 16    Mekhi Caceres, PTA  6/8/2022

## 2022-06-08 NOTE — PROGRESS NOTES
[facility]    Name:  Gene Mobley   Age:  89 y.o.  Sex:  male  :  3/14/1933  MRN:  [id number]   Visit Number:  [id number]  Admission Date:  2022  Date Of Service:  22  Primary Care Physician:  Provider, No Known     LOS: 14 days :    Chief Complaint:      Generalized weakness.    Subjective:    Mr. Mobley was seen and examined this morning.  He is currently lying down on the bed and is comfortable at rest.  He is currently down to 6 L and saturating in the low 90s.  He has been working with physical therapy but has not walked much due to generalized weakness and hypoxia.  No fevers.  He is currently being evaluated for short-term rehab placement.  Denies any depression.    Hospital Course:    Mr. Mobley is an 89-year-old male with history of hypertension, diabetes mellitus type 2 was admitted from the emergency room with cough, shortness of breath for 2 weeks.  Patient apparently was diagnosed with COVID-19 approximately 10 days prior to admission by his primary care provider and was treated with antibiotics therapy and steroids.  In the emergency room, his initial temperature was 99.1, pulse 69, blood pressure 171/69 and pulse oxygen saturation of 92% on room air.  His pulse oxygen saturation subsequently decreased into the low 80s and he was placed on 5 L of nasal cannula oxygen.  Blood work done in the emergency room revealed a creatinine of 1.49 (baseline) and hemoglobin of 12.  Patient received IV fluids and was treated symptomatically in the emergency room.  He was not continued on antibiotics therapy and was not placed on steroids.  His home medications for blood pressure were restarted.  Patient's FiO2 requirements did bump up and he had to be placed on 15 L nasal cannula oxygen the next day.  He subsequently slowly improved with a decrease in his FiO2 requirements.  He was noted to have elevated blood sugars and his Levemir dose was adjusted  accordingly.    Unfortunately, patient's oxygen requirements continued to worsen through the hospital stay and he was started on IV dexamethasone.  His procalcitonin remain negative and he was not initiated on any antibiotics.  Due to steroid therapy, his blood sugars did worsen and his Levemir dose was uptitrated accordingly.  He was seen by Dr. Ace from pulmonology.  Patient improved slowly over the course of the next 1 week and his oxygen was down titrated to 8 L.  He was continued on physical and occupational therapy.    Review of Systems:     All systems were reviewed and negative except as mentioned in subjective, assessment and plan.    Vital Signs:    Temp:  [96.4 °F (35.8 °C)-97.2 °F (36.2 °C)] 96.7 °F (35.9 °C)  Heart Rate:  [51-67] 55  Resp:  [18] 18  BP: (123-138)/(63-86) 130/63    Intake and output:    I/O last 3 completed shifts:  In: 630 [P.O.:630]  Out: 1700 [Urine:1700]  I/O this shift:  In: 240 [P.O.:240]  Out: -     Physical Examination:    General Appearance:  Alert and cooperative.   Head:  Atraumatic and normocephalic.   Eyes: Conjunctivae and sclerae normal, no icterus. No pallor.   Throat: No oral lesions, no thrush, oral mucosa moist.   Neck: Supple, trachea midline, no thyromegaly.   Lungs:   Breath sounds heard bilaterally equally.  No wheezing.  Bilateral scattered crackles heard.  No Pleural rub or bronchial breathing.   Heart:  Normal S1 and S2, no murmur, no gallop, no rub. No JVD.   Abdomen:   Normal bowel sounds, no masses, no organomegaly. Soft, nontender, nondistended, no rebound tenderness.   Extremities: Supple, 1+ pitting ankle edema, no cyanosis, no clubbing.   Skin: No bleeding or rash.   Neurologic: Alert and oriented x 3. No facial asymmetry. Moves all four limbs but does have generalized weakness. No tremors.      Laboratory results:    Results from last 7 days   Lab Units 06/08/22  0637 06/05/22  0634 06/02/22  0705   SODIUM mmol/L 135* 136 138   POTASSIUM mmol/L 4.9 4.6  4.8   CHLORIDE mmol/L 99 104 103   CO2 mmol/L 24.8 24.1 22.4   BUN mg/dL 74* 44* 43*   CREATININE mg/dL 1.48* 1.22 1.18   CALCIUM mg/dL 8.9 8.6 8.1*   GLUCOSE mg/dL 282* 214* 278*     Results from last 7 days   Lab Units 06/08/22  0637 06/02/22  0705   WBC 10*3/mm3 16.63* 10.47   HEMOGLOBIN g/dL 12.1* 11.4*   HEMATOCRIT % 35.2* 33.2*   PLATELETS 10*3/mm3 326 379     I have reviewed the patient's laboratory results.    Radiology results:    No radiology results from the last 24 hrs    Medication Review:     I have reviewed the patient's active and prn medications.     Problem List:      Acute respiratory failure with hypoxia (HCC)    Pneumonia due to COVID-19 virus    GOMEZ (acute kidney injury) (HCC)    Essential hypertension    Assessment:    1. Acute hypoxic respiratory failure, POA.  2. Bronchitis secondary to COVID-19 infection, POA.  3. Chronic kidney disease stage II.  4. Cholelithiasis noted on CT scan.  5. Essential hypertension.  6. Diabetes mellitus type 2.    Plan:    Respiratory failure/COVID-19.  -  Continue high flow oxygen at 6 L.  -  I have strongly advised him to sit up on the chair and use the incentive spirometry.  -  2D echocardiogram done during this hospitalization showed normal left ventricular ejection fraction at 65% with grade 1 diastolic dysfunction.  -  He has completed a 10-day course of dexamethasone.  -  Continue multivitamins and vitamin C.    Chronic kidney disease/hypertension.  - Renal function is at baseline.  - Continue home medications including carvedilol and amlodipine.  - Due to bump in creatinine I will hold his losartan tomorrow.  - Carvedilol has been increased to 12.5 mg twice daily.    Diabetes mellitus type 2.  - Blood sugars elevated due to steroid use.  - Continue Levemir 20 units twice daily.  - Continue subcutaneous insulin protocol for coverage.  - Hemoglobin A1c 8.4 on 5/26/2022.    I tried to call the patient's daughter Betty over the phone today but  unfortunately there was no response.      Discussed with nursing staff.    DVT Prophylaxis: Heparin  Code Status: DNR/DNI  Diet: Cardiac diabetic  Discharge Plan: Pending- short-term rehab placement when bed becomes available.    Abdoulaye Alcantara MD  06/08/22  14:34 EDT    Dictated utilizing Dragon dictation.

## 2022-06-08 NOTE — PLAN OF CARE
Goal Outcome Evaluation:  Plan of Care Reviewed With: patient        Progress: improving  Outcome Evaluation: Patient weaned to 3L of oxygen and tolerating well.

## 2022-06-08 NOTE — PLAN OF CARE
Problem: Adult Inpatient Plan of Care  Goal: Plan of Care Review  Recent Flowsheet Documentation  Taken 6/8/2022 1544 by Mekhi Caceres, [facility]  Progress: improving  Plan of Care Reviewed With: patient  Outcome Evaluation: Pt recieved supine in bed and willing to participate with treatment.  Pt required min a to transfer to EOB and min/ mod a for transfer from bed to chair.  Pt requires assistance for positioning of rw with mobility as well as safety.  Pt was increased to 8L pnc for mobility with 87% and was able to be tritrated down to 6 L and 94-95% while reclined in sitting.  See flowsheet for details   Goal Outcome Evaluation:  Plan of Care Reviewed With: patient        Progress: improving  Outcome Evaluation: Pt recieved supine in bed and willing to participate with treatment.  Pt required min a to transfer to EOB and min/ mod a for transfer from bed to chair.  Pt requires assistance for positioning of rw with mobility as well as safety.  Pt was increased to 8L pnc for mobility with 87% and was able to be tritrated down to 6 L and 94-95% while reclined in sitting.  See flowsheet for details

## 2022-06-08 NOTE — THERAPY TREATMENT NOTE
Patient Name: Gene Mobley  : 3/14/1933    MRN: [id number]                              Today's Date: 2022       Admit Date: 2022    Visit Dx:     ICD-10-CM ICD-9-CM   1. Pneumonia due to COVID-19 virus  U07.1 480.8    J12.82 079.89   2. Hypoxia  R09.02 799.02   3. GOMEZ (acute kidney injury) (HCC)  N17.9 584.9     Patient Active Problem List   Diagnosis   • Pneumonia due to COVID-19 virus   • GOMEZ (acute kidney injury) (HCC)   • Acute respiratory failure with hypoxia (HCC)   • Essential hypertension     Past Medical History:   Diagnosis Date   • Diabetes mellitus (HCC)    • Hypertension    • Impaired functional mobility, balance, gait, and endurance      Past Surgical History:   Procedure Laterality Date   • HERNIA REPAIR        General Information     Row Name 22 142          OT Time and Intention    Document Type therapy note (daily note)  -SD     Mode of Treatment occupational therapy  -SD     Row Name 22 142          General Information    Patient Profile Reviewed yes  -SD           User Key  (r) = Recorded By, (t) = Taken By, (c) = Cosigned By    Initials Name Provider Type    SD Jenifer Natarajan OT Occupational Therapist                 Mobility/ADL's     Row Name 22 142          Bed Mobility    Supine-Sit [place] (Bed Mobility) standby assist;contact guard  -SD     Assistive Device (Bed Mobility) bed rails;head of bed elevated  -SD     Row Name 22 142          Transfers    Transfers sit-stand transfer;bed-chair transfer  -SD     Bed-Chair [place] (Transfers) minimum assist (75% patient effort)  -SD     Assistive Device (Bed-Chair Transfers) walker, front-wheeled  -SD     Sit-Stand [place] (Transfers) minimum assist (75% patient effort)  -SD     Row Name 22 142          Sit-Stand Transfer    Assistive Device (Sit-Stand Transfers) walker, front-wheeled  -SD     Row Name 22 142          Grooming Assessment/Training    [place] Level  (Grooming) hair care, combing/brushing;wash face, hands;standby assist  -SD     Position (Grooming) supported sitting  -SD     Row Name 06/08/22 1428          Toileting Assessment/Training    [place] Level (Toileting) adjust/manage clothing;change pad/brief;perform perineal hygiene;maximum assist (25% patient effort)  -SD           User Key  (r) = Recorded By, (t) = Taken By, (c) = Cosigned By    Initials Name Provider Type    Jenifer Castle OT Occupational Therapist               Obj/Interventions    No documentation.                Goals/Plan    No documentation.                Clinical Impression     Row Name 06/08/22 1428          Pain Assessment    Pretreatment Pain Rating 0/10 - no pain  -SD     Posttreatment Pain Rating 0/10 - no pain  -SD     Row Name 06/08/22 1428          Plan of Care Review    Plan of Care Reviewed With patient  -SD     Progress improving  -SD     Outcome Evaluation OT tx completed. Improved performance with all mobility, completed bed mobility with CGA, transfer to chair with min A using RW. Sats at rest on 3L at 95%, increased to 7L with activity and dropped to 87%, Decreased to 6L and satting at 93% sitting in chair. Notified RN patient to return to bed at 1500. Continue OT POC  -SD     Row Name 06/08/22 1428          Vital Signs    Pre SpO2 (%) 95  -SD     O2 Delivery Pre Treatment supplemental O2  3L  -SD     Intra SpO2 (%) 87  -SD     O2 Delivery Intra Treatment supplemental O2  7L  -SD     Post SpO2 (%) 93  -SD     O2 Delivery Post Treatment supplemental O2  6L  -SD     Row Name 06/08/22 1428          Positioning and Restraints    Pre-Treatment Position in bed  -SD     Post Treatment Position chair  -SD     In Chair reclined;call light within reach;encouraged to call for assist;with PT;notified nsg  -SD           User Key  (r) = Recorded By, (t) = Taken By, (c) = Cosigned By    Initials Name Provider Type    Jenifer Castle OT Occupational Therapist                Outcome Measures     Row Name 06/08/22 1430          How much help from another is currently needed...    Putting on and taking off regular lower body clothing? 2  -SD     Bathing (including washing, rinsing, and drying) 2  -SD     Toileting (which includes using toilet bed pan or urinal) 2  -SD     Putting on and taking off regular upper body clothing 3  -SD     Taking care of personal grooming (such as brushing teeth) 3  -SD     Eating meals 4  -SD     AM-PAC 6 Clicks Score (OT) 16  -SD     Row Name 06/08/22 1430          Functional Assessment    Outcome Measure Options AM-PAC 6 Clicks Daily Activity (OT)  -SD           User Key  (r) = Recorded By, (t) = Taken By, (c) = Cosigned By    Initials Name Provider Type    Jenifer Castle OT Occupational Therapist                Occupational Therapy Education                 Title: PT OT SLP Therapies (In Progress)     Topic: Occupational Therapy (Done)     Point: ADL training (Done)     Description:   Instruct learner(s) on proper safety adaptation and remediation techniques during self care or transfers.   Instruct in proper use of assistive devices.              Learning Progress Summary           Patient Acceptance, E,TB, VU by SD at 6/8/2022 1431    Comment: Increased activity as strength improves      Show all documentation for this point (5)                 Point: Home exercise program (Done)     Description:   Instruct learner(s) on appropriate technique for monitoring, assisting and/or progressing therapeutic exercises/activities.              Learning Progress Summary           Patient Acceptance, E,TB, VU by SD at 6/1/2022 1306    Comment: Benefit of OT; OT POC                   Point: Precautions (Done)     Description:   Instruct learner(s) on prescribed precautions during self-care and functional transfers.              Learning Progress Summary           Patient Acceptance, E,TB, VU by SD at 6/1/2022 1306    Comment: Benefit of OT; OT POC                    Point: Body mechanics (Done)     Description:   Instruct learner(s) on proper positioning and spine alignment during self-care, functional mobility activities and/or exercises.              Learning Progress Summary           Patient Acceptance, E,TB, VU by SD at 6/1/2022 1306    Comment: Benefit of OT; OT POC                               User Key     Initials Effective Dates Name Provider Type Discipline    SD 06/16/21 -  Jenifer Natarajan OT Occupational Therapist OT              OT Recommendation and Plan  Planned Therapy Interventions (OT): activity tolerance training, adaptive equipment training, BADL retraining, patient/caregiver education/training, ROM/therapeutic exercise, strengthening exercise, transfer/mobility retraining  Therapy Frequency (OT): 3 times/wk (5 times if indicated)  Plan of Care Review  Plan of Care Reviewed With: patient  Progress: improving  Outcome Evaluation: OT tx completed. Improved performance with all mobility, completed bed mobility with CGA, transfer to chair with min A using RW. Sats at rest on 3L at 95%, increased to 7L with activity and dropped to 87%, Decreased to 6L and satting at 93% sitting in chair. Notified RN patient to return to bed at 1500. Continue OT POC     Time Calculation:    Time Calculation- OT     Row Name 06/08/22 1431             Time Calculation- OT    OT Start Time 1351  -SD      OT Stop Time 1420  -SD      OT Time Calculation (min) 29 min  -SD      OT Received On 06/08/22  -SD      OT Goal Re-Cert Due Date 06/13/22  -SD              Timed Charges    75573 - OT Therapeutic Activity Minutes 8  -SD      06690 - OT Self Care/Mgmt Minutes 15  -SD              Total Minutes    Timed Charges Total Minutes 23  -SD       Total Minutes 23  -SD            User Key  (r) = Recorded By, (t) = Taken By, (c) = Cosigned By    Initials Name Provider Type    SD Jenifer Natarajan OT Occupational Therapist              Therapy Charges for Today     Code Description  Service Date Service Provider Modifiers Qty    99898735513  OT SELF CARE/MGMT/TRAIN EA 15 MIN 6/7/2022 Jenifer Natarajan OT GO 2    [id number]  OT THERAPEUTIC ACT EA 15 MIN 6/8/2022 Jenifer Natarajan OT GO 1    [id number]  OT SELF CARE/MGMT/TRAIN EA 15 MIN 6/8/2022 Jenifer Natarajan OT GO 1               Jenifer Natarajan OT  6/8/2022

## 2022-06-08 NOTE — PLAN OF CARE
Goal Outcome Evaluation:  Plan of Care Reviewed With: patient        Progress: improving  Outcome Evaluation: OT tx completed. Improved performance with all mobility, completed bed mobility with CGA, transfer to chair with min A using RW. Sats at rest on 3L at 95%, increased to 7L with activity and dropped to 87%, Decreased to 6L and satting at 93% sitting in chair. Notified RN patient to return to bed at 1500. Continue OT POC

## 2022-06-09 NOTE — PLAN OF CARE
"Goal Outcome Evaluation:  Plan of Care Reviewed With: patient        Progress: improving  Outcome Evaluation: Patient on 4L of oxygen at this time. Oxygen needs increased after rapid response but were slowly weaned back down throughout the day. Patient given xanax one time per PRN order for \"nerves\" and patient stated it was effective  "

## 2022-06-09 NOTE — THERAPY TREATMENT NOTE
OT tx held this date to allow patient time to rest following fall and rapid response this am; will follow up tomorrow and progress activity as tolerated.

## 2022-06-09 NOTE — NURSING NOTE
RN responded to room when bathroom light was not turned off. PCT Hess in room with patient, patient on floor with walker in the doorway to the bathroom. Per PCT Hess patient had been assisted with walker and gait belt to bathroom, became weak and was assisted to the floor. Oxygen had become unhooked from the wall in the process. RN helped patient sit up straight, [place] connected patient to pulse ox, KELSIE Green checked patient's telemetry monitor and HR in 50's. Pulse oximetry once connected read 88%. Patient opening eyes to stimulation but would not answer RN's questions. 15L NRB placed on patient as it reached bathroom floor. Rapid response called. Patient assisted back to bed, not answering questions or following commands. MD Alcantara at bedside. Glucose 177. Patient situated in bed and began to become more responsive, answering questions and moving extremities. Head CT ordered and resulted negative. Patient returned to 5L of oxygen. Patient declines pain at this time. KELSIE Green offered to patient that she call his family and update and he declined at this time but told RN that if they called for an update she could share information.

## 2022-06-09 NOTE — PROGRESS NOTES
[facility]    Name:  Gene Mobley   Age:  89 y.o.  Sex:  male  :  3/14/1933  MRN:  [id number]   Visit Number:  [id number]  Admission Date:  2022  Date Of Service:  22  Primary Care Physician:  Provider, No Known     LOS: 15 days :    Chief Complaint:      Generalized weakness.    Subjective:    Mr. Mobley was seen and examined this morning and again after an hour or so.  He had rapid response was called this morning.  Apparently, when the nurses aide was walking the patient to the bathroom with a walker, patient became limp and the nurses aide eased him down to the floor.  Patient had his oxygen mask come off, but did not become cyanotic.  He did not lose his pulse.  He was alert but was anxious looking.  He did not have any focal weakness and was able to move all 4 limbs despite having generalized weakness.  Patient was subsequently lifted back up to the bed.  There was no bowel or bladder incontinence.  Patient was on 3 L of nasal cannula oxygen earlier this morning but subsequently had to be bumped up to 10 L during this event with subsequent decrease to 4 L in the afternoon.  He was hemodynamically stable and did not have any evidence of hypoglycemia.  His fingerstick during the episode was 177.    Hospital Course:    Mr. Mobley is an 89-year-old male with history of hypertension, diabetes mellitus type 2 was admitted from the emergency room with cough, shortness of breath for 2 weeks.  Patient apparently was diagnosed with COVID-19 approximately 10 days prior to admission by his primary care provider and was treated with antibiotics therapy and steroids.  In the emergency room, his initial temperature was 99.1, pulse 69, blood pressure 171/69 and pulse oxygen saturation of 92% on room air.  His pulse oxygen saturation subsequently decreased into the low 80s and he was placed on 5 L of nasal cannula oxygen.  Blood work done in the emergency room revealed  a creatinine of 1.49 (baseline) and hemoglobin of 12.  Patient received IV fluids and was treated symptomatically in the emergency room.  He was not continued on antibiotics therapy and was not placed on steroids.  His home medications for blood pressure were restarted.  Patient's FiO2 requirements did bump up and he had to be placed on 15 L nasal cannula oxygen the next day.  He subsequently slowly improved with a decrease in his FiO2 requirements.  He was noted to have elevated blood sugars and his Levemir dose was adjusted accordingly.    Unfortunately, patient's oxygen requirements continued to worsen through the hospital stay and he was started on IV dexamethasone.  His procalcitonin remain negative and he was not initiated on any antibiotics.  Due to steroid therapy, his blood sugars did worsen and his Levemir dose was uptitrated accordingly.  He was seen by Dr. Ace from pulmonology.  Patient improved slowly over the course of the next 1 week and his oxygen was down titrated to 8 L.  He was continued on physical and occupational therapy.  Patient had an assisted fall on 6/9/2022 without any obvious trauma.  His CT of the head was done which did not show any acute abnormalities.    Review of Systems:     All systems were reviewed and negative except as mentioned in subjective, assessment and plan.    Vital Signs:    Temp:  [97.2 °F (36.2 °C)-98.6 °F (37 °C)] 97.2 °F (36.2 °C)  Heart Rate:  [53-83] 58  Resp:  [18-20] 20  BP: (104-138)/(54-91) 119/61    Intake and output:    I/O last 3 completed shifts:  In: 780 [P.O.:780]  Out: 1000 [Urine:1000]  I/O this shift:  In: -   Out: 300 [Urine:300]    Physical Examination:    General Appearance:  Alert and cooperative.   Head:  Atraumatic and normocephalic.   Eyes: Conjunctivae and sclerae normal, no icterus. No pallor.   Throat: No oral lesions, no thrush, oral mucosa moist.   Neck: Supple, trachea midline, no thyromegaly.   Lungs:   Breath sounds heard bilaterally  equally.  No wheezing.  Bilateral scattered crackles heard.  No Pleural rub or bronchial breathing.   Heart:  Normal S1 and S2, no murmur, no gallop, no rub. No JVD.   Abdomen:   Normal bowel sounds, no masses, no organomegaly. Soft, nontender, nondistended, no rebound tenderness.   Extremities: Supple, 1+ pitting ankle edema, no cyanosis, no clubbing.   Skin: No bleeding or rash.   Neurologic: Alert and oriented x 3. No facial asymmetry. Moves all four limbs but does have generalized weakness. No tremors.      Laboratory results:    Results from last 7 days   Lab Units 06/09/22  0644 06/08/22  0637 06/05/22  0634   SODIUM mmol/L 136 135* 136   POTASSIUM mmol/L 4.4 4.9 4.6   CHLORIDE mmol/L 101 99 104   CO2 mmol/L 22.4 24.8 24.1   BUN mg/dL 79* 74* 44*   CREATININE mg/dL 1.37* 1.48* 1.22   CALCIUM mg/dL 9.2 8.9 8.6   BILIRUBIN mg/dL 0.4  --   --    ALK PHOS U/L 71  --   --    ALT (SGPT) U/L 29  --   --    AST (SGOT) U/L 13  --   --    GLUCOSE mg/dL 190* 282* 214*     Results from last 7 days   Lab Units 06/09/22 0644 06/08/22  0637   WBC 10*3/mm3 13.12* 16.63*   HEMOGLOBIN g/dL 12.5* 12.1*   HEMATOCRIT % 36.7* 35.2*   PLATELETS 10*3/mm3 285 326     I have reviewed the patient's laboratory results.    Radiology results:    CT Head Without Contrast    Result Date: 6/9/2022  PROCEDURE: CT HEAD WO CONTRAST-  HISTORY: Fall and confusion; U07.1-COVID-19; J12.82-Pneumonia due to coronavirus disease 2019; R09.02-Hypoxemia; N17.9-Acute kidney failure, unspecified, altered mental status. Confusion.  TECHNIQUE: Noncontrast exam  FINDINGS:  Moderate atrophy and chronic ischemic white matter changes are noted.  No cortical edema is present. There is no mass or hemorrhage. Ventricles are normal.  Bone windows show no skull fracture or obvious destructive lesion.      Impression: 1. No acute intracranial abnormality or obvious mass. 2. Atrophy and chronic ischemic white matter changes as above.   This study was performed with  techniques to keep radiation doses as low as reasonably achievable (ALARA). Individualized dose reduction techniques using automated exposure control or adjustment of vA and/or kV according to the patient size were employed.    This report was signed and finalized on 6/9/2022 8:53 AM by Tucker Norris MD.    Medication Review:     I have reviewed the patient's active and prn medications.     Problem List:      Acute respiratory failure with hypoxia (HCC)    Pneumonia due to COVID-19 virus    GOMEZ (acute kidney injury) (HCC)    Essential hypertension    Assessment:    1. Acute hypoxic respiratory failure, POA.  2. Bronchitis secondary to COVID-19 infection, POA, improving.  3. Chronic kidney disease stage II.  4. Cholelithiasis noted on CT scan.  5. Essential hypertension.  6. Diabetes mellitus type 2.    Plan:    Respiratory failure/COVID-19.  -  Continue high flow oxygen at 4 L.  -  Continue physical and occupational therapy.  -  2D echocardiogram done during this hospitalization showed normal left ventricular ejection fraction at 65% with grade 1 diastolic dysfunction.  -  He has completed a 10-day course of dexamethasone.  -  Continue multivitamins and vitamin C.    Chronic kidney disease/hypertension.  - Renal function is at baseline.  - Continue home medications including carvedilol and amlodipine.  - We will hold losartan for another day.  - Carvedilol has been increased to 12.5 mg twice daily.    Diabetes mellitus type 2.  - Blood sugar control has improved with discontinuation of steroids.  - Continue Levemir 20 units twice daily.  - Continue subcutaneous insulin protocol for coverage.  - Hemoglobin A1c 8.4 on 5/26/2022.    I discussed the patient's condition and treatment plan with his daughter Betty over the phone today.      Discussed with nursing staff at the bedside.  Discussed with case management services.    DVT Prophylaxis: Heparin  Code Status: DNR/DNI  Diet: Cardiac diabetic  Discharge Plan:  Pending- short-term rehab placement when bed becomes available.    Abdoulaye Alcantara MD  06/09/22  15:41 EDT    Dictated utilizing Dragon dictation.

## 2022-06-09 NOTE — PLAN OF CARE
Problem: Adult Inpatient Plan of Care  Goal: Plan of Care Review  Outcome: Ongoing, Progressing  Flowsheets (Taken 6/9/2022 0405)  Progress: improving  Plan of Care Reviewed With: patient  Goal: Patient-Specific Goal (Individualized)  Outcome: Ongoing, Progressing  Goal: Absence of Hospital-Acquired Illness or Injury  Outcome: Ongoing, Progressing  Intervention: Identify and Manage Fall Risk  Recent Flowsheet Documentation  Taken 6/9/2022 0405 by Hayley Murray RN  Safety Promotion/Fall Prevention: safety round/check completed  Taken 6/9/2022 0254 by Hayley Murray RN  Safety Promotion/Fall Prevention: safety round/check completed  Taken 6/9/2022 0010 by Hayley Murray RN  Safety Promotion/Fall Prevention: safety round/check completed  Taken 6/8/2022 2035 by Hayley Murray RN  Safety Promotion/Fall Prevention: safety round/check completed  Intervention: Prevent Skin Injury  Recent Flowsheet Documentation  Taken 6/9/2022 0405 by Hayley Murray RN  Body Position: position changed independently  Taken 6/9/2022 0254 by Hayley Murray RN  Body Position: position changed independently  Taken 6/9/2022 0010 by Hayley Murray RN  Body Position: position changed independently  Taken 6/8/2022 2035 by Hayley Murray RN  Body Position: position changed independently  Goal: Optimal Comfort and Wellbeing  Outcome: Ongoing, Progressing  Goal: Readiness for Transition of Care  Outcome: Ongoing, Progressing     Problem: Fall Injury Risk  Goal: Absence of Fall and Fall-Related Injury  Outcome: Ongoing, Progressing  Intervention: Promote Injury-Free Environment  Recent Flowsheet Documentation  Taken 6/9/2022 0405 by Hayley Murray RN  Safety Promotion/Fall Prevention: safety round/check completed  Taken 6/9/2022 0254 by Hayley Murray RN  Safety Promotion/Fall Prevention: safety round/check completed  Taken 6/9/2022 0010 by Hayley Murray RN  Safety Promotion/Fall Prevention: safety  round/check completed  Taken 6/8/2022 2035 by Hayley Murray RN  Safety Promotion/Fall Prevention: safety round/check completed     Problem: Breathing Pattern Ineffective  Goal: Effective Breathing Pattern  Outcome: Ongoing, Progressing  Intervention: Promote Improved Breathing Pattern  Recent Flowsheet Documentation  Taken 6/9/2022 0405 by Hayley Murray RN  Head of Bed (HOB) Positioning: HOB elevated  Taken 6/9/2022 0254 by Hayley Murray RN  Head of Bed (HOB) Positioning: HOB elevated  Taken 6/9/2022 0010 by Hayley Murray RN  Head of Bed (HOB) Positioning: HOB elevated  Taken 6/8/2022 2035 by Hayley Murray RN  Head of Bed (HOB) Positioning: HOB elevated     Problem: Gas Exchange Impaired  Goal: Optimal Gas Exchange  Outcome: Ongoing, Progressing  Intervention: Optimize Oxygenation and Ventilation  Recent Flowsheet Documentation  Taken 6/9/2022 0405 by Hayley Murray RN  Head of Bed (HOB) Positioning: HOB elevated  Taken 6/9/2022 0254 by Hayley Murray RN  Head of Bed (HOB) Positioning: HOB elevated  Taken 6/9/2022 0010 by Hayley Murray RN  Head of Bed (HOB) Positioning: HOB elevated  Taken 6/8/2022 2035 by Hayley Murray RN  Head of Bed (HOB) Positioning: HOB elevated     Problem: Skin Injury Risk Increased  Goal: Skin Health and Integrity  Outcome: Ongoing, Progressing  Intervention: Optimize Skin Protection  Recent Flowsheet Documentation  Taken 6/9/2022 0405 by Hayley Murray RN  Head of Bed (HOB) Positioning: HOB elevated  Taken 6/9/2022 0254 by Hayley Murray RN  Head of Bed (HOB) Positioning: HOB elevated  Taken 6/9/2022 0010 by Hayley Murray RN  Head of Bed (HOB) Positioning: HOB elevated  Taken 6/8/2022 2035 by Hayley Murray RN  Head of Bed (HOB) Positioning: HOB elevated     Problem: Fluid Imbalance (Hospitalized Older Adult)  Goal: Fluid Balance  Outcome: Ongoing, Progressing     Problem: Functional Ability Impaired (Hospitalized Older  Adult)  Goal: Optimal Functional Ability  Outcome: Ongoing, Progressing     Problem: Oral Intake Inadequate (Hospitalized Older Adult)  Goal: Optimal Nutrition Intake  Outcome: Ongoing, Progressing   Goal Outcome Evaluation:  Plan of Care Reviewed With: patient        Progress: improving

## 2022-06-09 NOTE — CASE MANAGEMENT/SOCIAL WORK
Continued Stay Note   [facility]     Patient Name: Gene Mobley  MRN: [id number]  Today's Date: 6/9/2022    Admit Date: 5/25/2022     Discharge Plan     Row Name 06/09/22 1555       Plan    Plan Left message for Kaila at [facility] for update on pre cert               Discharge Codes    No documentation.               Expected Discharge Date and Time     Expected Discharge Date Expected Discharge Time    Aly 10, 2022             Veronique Horn RN

## 2022-06-10 PROBLEM — D89.832 CYTOKINE RELEASE SYNDROME, GRADE 2: Status: RESOLVED | Noted: 2022-01-01 | Resolved: 2022-01-01

## 2022-06-10 PROBLEM — D89.832 CYTOKINE RELEASE SYNDROME, GRADE 2: Status: ACTIVE | Noted: 2022-01-01

## 2022-06-10 PROBLEM — U07.1 PNEUMONIA DUE TO COVID-19 VIRUS: Status: RESOLVED | Noted: 2022-01-01 | Resolved: 2022-01-01

## 2022-06-10 PROBLEM — J12.82 PNEUMONIA DUE TO COVID-19 VIRUS: Status: RESOLVED | Noted: 2022-01-01 | Resolved: 2022-01-01

## 2022-06-10 NOTE — CASE MANAGEMENT/SOCIAL WORK
Continued Stay Note   [facility]     Patient Name: Gene Mobley  MRN: [id number]  Today's Date: 6/10/2022    Admit Date: 5/25/2022     Discharge Plan     Row Name 06/10/22 1031       Plan    Plan called and spoke with Ekaterina/[facility] stated they were awaiting to see how pt was eating also; will check and call us back  14:38 EDT  Spoke to Ekaterina/[facility] said looking for referral will call back   16:57 EDT  Spoke with Ekaterina/[facility] and she stated that she found referral and it was not precert but declined by her md; md informed; updated referrals sent to facilities                 Discharge Codes    No documentation.               Expected Discharge Date and Time     Expected Discharge Date Expected Discharge Time    Aly 10, 2022             Bridgett Gonzales RN

## 2022-06-10 NOTE — THERAPY TREATMENT NOTE
Patient Name: Gene Mobley  : 3/14/1933    MRN: [id number]                              Today's Date: 6/10/2022       Admit Date: 2022    Visit Dx:     ICD-10-CM ICD-9-CM   1. Pneumonia due to COVID-19 virus  U07.1 480.8    J12.82 079.89   2. Hypoxia  R09.02 799.02   3. GOMEZ (acute kidney injury) (HCC)  N17.9 584.9     Patient Active Problem List   Diagnosis   • GOMEZ (acute kidney injury) (HCC)   • Acute respiratory failure with hypoxia (HCC)   • Essential hypertension     Past Medical History:   Diagnosis Date   • Diabetes mellitus (HCC)    • Hypertension    • Impaired functional mobility, balance, gait, and endurance      Past Surgical History:   Procedure Laterality Date   • HERNIA REPAIR        General Information     Row Name 06/10/22 1701          Physical Therapy Time and Intention    Document Type therapy note (daily note)  -     Mode of Treatment physical therapy  -     Row Name 06/10/22 1701          General Information    Patient Profile Reviewed yes  -CC     Existing Precautions/Restrictions fall;oxygen therapy device and L/min  -     Row Name 06/10/22 1701          Cognition    Orientation Status (Cognition) oriented x 4  -CC     Row Name 06/10/22 1701          Safety Issues, Functional Mobility    Safety Issues Affecting Function (Mobility) positioning of assistive device;safety precautions follow-through/compliance;sequencing abilities  -     Impairments Affecting Function (Mobility) balance;endurance/activity tolerance;shortness of breath;strength  -           User Key  (r) = Recorded By, (t) = Taken By, (c) = Cosigned By    Initials Name Provider Type    CC Charity Daily PTA Physical Therapist Assistant               Mobility     Row Name 06/10/22 1702          Bed Mobility    Sit-Supine [place] (Bed Mobility) standby assist;modified independence  -     Row Name 06/10/22 1702          Sit-Stand Transfer    Sit-Stand [place] (Transfers) contact guard;minimum assist  (75% patient effort);verbal cues  -CC     Assistive Device (Sit-Stand Transfers) walker, front-wheeled  -CC     Row Name 06/10/22 1702          Gait/Stairs (Locomotion)    [place] Level (Gait) contact guard  -CC     Assistive Device (Gait) walker, front-wheeled  -CC     Distance in Feet (Gait) 10  -CC           User Key  (r) = Recorded By, (t) = Taken By, (c) = Cosigned By    Initials Name Provider Type    Charity Smith, HAYLIE Physical Therapist Assistant               Obj/Interventions    No documentation.                Goals/Plan    No documentation.                Clinical Impression     Row Name 06/10/22 1703          Pain    Pretreatment Pain Rating 0/10 - no pain  -CC     Posttreatment Pain Rating 0/10 - no pain  -CC     Additional Documentation Pain Scale: Numbers Pre/Post-Treatment (Group)  -CC     Row Name 06/10/22 1703          Plan of Care Review    Plan of Care Reviewed With patient  -CC     Progress improving  -CC     Outcome Evaluation Performed sit<->stand with CGA and RW and VC for hand placement, and amb 10 feet chair to bed including side stepping to HOB, sit to supine with SBA/S. Pt on 3 L O2 with SATS 88-93% with VC to breathe in thru nose. Off loaded heel to decrease risk of skin breakdown. Pt required sitting rest break EOB prior to performing sit to supine d/t SOA with O2 SATS briefly dropping to 87% with deep breath increased to 88%. Prior to PTA leaving pt's O2 SATS 92-94%.  -CC     Row Name 06/10/22 1703          Positioning and Restraints    Pre-Treatment Position sitting in chair/recliner  -CC     Post Treatment Position bed  -CC     In Bed supine;call light within reach;encouraged to call for assist;exit alarm on;fowlers  -CC           User Key  (r) = Recorded By, (t) = Taken By, (c) = Cosigned By    Initials Name Provider Type    Charity Smith PTA Physical Therapist Assistant               Outcome Measures     Row Name 06/10/22 1710          How much help from another  person do you currently need...    Turning from your back to your side while in flat bed without using bedrails? 4  -CC     Moving from lying on back to sitting on the side of a flat bed without bedrails? 4  -CC     Moving to and from a bed to a chair (including a wheelchair)? 3  -CC     Standing up from a chair using your arms (e.g., wheelchair, bedside chair)? 3  -CC     Climbing 3-5 steps with a railing? 1  -CC     To walk in hospital room? 2  -CC     AM-PAC 6 Clicks Score (PT) 17  -CC     Highest level of mobility 5 --> Static standing  -CC     Row Name 06/10/22 1710          Functional Assessment    Outcome Measure Options AM-PAC 6 Clicks Basic Mobility (PT)  -CC           User Key  (r) = Recorded By, (t) = Taken By, (c) = Cosigned By    Initials Name Provider Type     Charity Daily, HAYLIE Physical Therapist Assistant                             Physical Therapy Education                 Title: PT OT SLP Therapies (In Progress)     Topic: Physical Therapy (In Progress)     Point: Mobility training (Done)     Learning Progress Summary           Patient Acceptance, E,TB,D, VU,NR by  at 6/8/2022 1549    Comment: walker positioning and safety during mobility      Show all documentation for this point (6)                 Point: Home exercise program (Not Started)     Learner Progress:  Not documented in this visit.          Point: Body mechanics (Not Started)     Learner Progress:  Not documented in this visit.          Point: Precautions (Done)     Learning Progress Summary           Patient Acceptance, E,TB, VU by  at 5/30/2022 1436    Comment: importance of not over excerting and maintain O2 levels >90%                               User Key     Initials Effective Dates Name Provider Type Discipline     06/16/21 -  Charity Daily PTA Physical Therapist Assistant PT     06/16/21 -  Mekhi Caceres PTA Physical Therapist Assistant PT              PT Recommendation and Plan     Plan of Care  Reviewed With: patient  Progress: improving  Outcome Evaluation: Performed sit<->stand with CGA and RW and VC for hand placement, and amb 10 feet chair to bed including side stepping to HOB, sit to supine with SBA/S. Pt on 3 L O2 with SATS 88-93% with VC to breathe in thru nose. Off loaded heel to decrease risk of skin breakdown. Pt required sitting rest break EOB prior to performing sit to supine d/t SOA with O2 SATS briefly dropping to 87% with deep breath increased to 88%. Prior to PTA leaving pt's O2 SATS 92-94%.     Time Calculation:    PT Charges     Row Name 06/10/22 1711             Time Calculation    PT Received On 06/10/22  -CC      PT Goal Re-Cert Due Date 06/17/22  -CC              Time Calculation- PT    Total Timed Code Minutes- PT 30 minute(s)  -CC              Timed Charges    52280 - Gait Training Minutes  10  -CC      21342 - PT Therapeutic Activity Minutes 20  -CC              Total Minutes    Timed Charges Total Minutes 30  -CC       Total Minutes 30  -CC            User Key  (r) = Recorded By, (t) = Taken By, (c) = Cosigned By    Initials Name Provider Type    CC Charity Daily PTA Physical Therapist Assistant              Therapy Charges for Today     Code Description Service Date Service Provider Modifiers Qty    12016544137 HC GAIT TRAINING EA 15 MIN 6/10/2022 Charity Daily PTA GP 1    65854688258 HC PT THERAPEUTIC ACT EA 15 MIN 6/10/2022 Charity Daily PTA GP 1          PT G-Codes  Outcome Measure Options: AM-PAC 6 Clicks Basic Mobility (PT)  AM-PAC 6 Clicks Score (PT): 17  AM-PAC 6 Clicks Score (OT): 16    Charity Daily PTA  6/10/2022

## 2022-06-10 NOTE — PLAN OF CARE
Goal Outcome Evaluation:  Plan of Care Reviewed With: patient        Progress: improving  Outcome Evaluation: Performed sit<->stand with CGA and RW and VC for hand placement, and amb 10 feet chair to bed including side stepping to HOB, sit to supine with SBA/S. Pt on 3 L O2 with SATS 88-93% with VC to breathe in thru nose. Off loaded heel to decrease risk of skin breakdown. Pt required sitting rest break EOB prior to performing sit to supine d/t SOA with O2 SATS briefly dropping to 87% with deep breath increased to 88%. Prior to PTA leaving pt's O2 SATS 92-94%.

## 2022-06-10 NOTE — DISCHARGE PLACEMENT REQUEST
"Referral for Rehab  :  Bridgett Gonzales [phone]      Gene Mobley (89 y.o. Male)             Date of Birth   1933    Social Security Number       Address   [address] [facility] [address]    Home Phone   [phone]    MRN   [id number]       Holiness   None    Marital Status                               Admission Date   22    Admission Type   Emergency    Admitting Provider   Abdoulaye Alcantara MD    Attending Provider   Abdoulaye Alcantara MD    Department, Room/Bed   [facility] MED SURG  3, 326/1       Discharge Date       Discharge Disposition       Discharge Destination                               Attending Provider: Abdoulaye Alcantara MD    Allergies: No Known Allergies    Isolation: Enh Drop/Con   Infection: COVID (confirmed) (22)   Code Status: No CPR   Advance Care Planning Activity    Ht: 175.3 cm (69.02\")   Wt: 94.6 kg (208 lb 8.9 oz)    Admission Cmt: None   Principal Problem: Acute respiratory failure with hypoxia (HCC) [J96.01]                 Active Insurance as of 2022     Primary Coverage     Payor Plan Insurance Group Employer/Plan Group    HUMANA MEDICARE REPLACEMENT HUMANA MEDICARE REPLACEMENT [id number]     Payor Plan Address Payor Plan Phone Number Payor Plan Fax Number Effective Dates    [address] [address] [phone]  2018 - None Entered    [facility] [address]       Subscriber Name Subscriber Birth Date Member ID       GENE MOBLEY 3/14/1933 [id number]                 Emergency Contacts      (Rel.) Home Phone Work Phone Mobile Phone    DARLIN MOBLEY (Spouse) [phone] -- --    BRANDY [phone] -- --               History & Physical      Matthew Cunningham MD at [facility]            [facility]   HISTORY AND PHYSICAL      Name:  Gene Mobley   Age:  89 y.o.  Sex:  male  :  3/14/1933  MRN:  [id number]   Visit Number:  [id number]  Admission Date:  2022  Date Of Service:  " "05/25/22  Primary Care Physician:  Provider, No Known    Chief Complaint:     Shortness of breath x 2 weeks    History Of Presenting Illness:      Mr. Mobley is an 89 year old  male with a past medical history of hypertension, type 2 diabetes mellitus and a \"problem with the pancreas\" who presented to the ED with a chief complaint of gradually worsening shortness of breath x 2 weeks. He has also had a dry cough and nausea along with intermittent fevers (highest recorded fever was 100.4 degrees F). He was diagnosed with COVID-19 approximately 10 days ago by his primary care provider and since then has been taking an antibiotic twice daily, the name of which he does not remember. He has not been taking steroids. He saw his primary care provider earlier today who diagnosed him with a pneumonia and prompted him to go to the ED. He has not been vaccinated against COVID-19. He has had very poor appetite and oral intake. Creatinine level is 1.49 (no baseline). BUN is 24. ABG is 7.47/31/56 on RA. , ferritin 1497, crp 17, d-dimer 0.64, procal 0.14. CTA chest  PE protocol revealed a viral pneumonia and ruled out a pulmonary embolism. He denies having had chest pain, diarrhea or dysuria.     Initial vitals from the ED:  T 99.1 degrees F - P 69 - RR 19 - /69 - SpO2 92% RA    Studies from the ED:  ABG 7.47/31/56 on RA  Troponin T <0.01, ProBNP 194  , ferritin 1497, crp 17, d-dimer 0.64, procal 0.14  Na 134, K 4.4, Cr 1.49 (no baseline)  Wbc 8.6, hgb 12.2, platelets 249  EKG: NSR HR 72 RBBB  CTA chest  PE protocol - Viral pneumonia. No pulmonary embolism.  Gallstones.    ED Medications: NS bolus x 500mL, Rocephin 1g, Azithromycin IV 500mg    Review Of Systems:    All systems were reviewed and negative except as mentioned in history of presenting illness, assessment and plan.    Past Medical History: Patient  has a past medical history of Diabetes mellitus (HCC) and Hypertension.    Past Surgical " "History: Patient  has a past surgical history that includes Hernia repair.    Social History: Patient  reports that he has quit smoking. He does not have any smokeless tobacco history on file. He reports that he does not drink alcohol and does not use drugs. He lives with his wife and at baseline ambulates with a walker.    Family History: Patient's family history is not on file.    Allergies:      Patient has no known allergies.    Home Medications:    Prior to Admission Medications     None          Vital Signs:  Temp:  [99.1 °F (37.3 °C)] 99.1 °F (37.3 °C)  Heart Rate:  [69-79] 76  Resp:  [18-20] 18  BP: (136-171)/(69-84) 167/72        05/25/22  1737   Weight: 102 kg (225 lb)     Body mass index is 33.23 kg/m².    Physical Exam:     Most recent vital Signs: /72   Pulse 76   Temp 99.1 °F (37.3 °C) (Oral)   Resp 18   Ht 175.3 cm (69\")   Wt 102 kg (225 lb)   SpO2 94%   BMI 33.23 kg/m²     General Appearance:  Alert and cooperative. NAD.   Head:  Atraumatic and normocephalic.   Eyes: Conjunctivae and sclerae normal, no icterus. No pallor.   Throat: No oral lesions, oral mucosa moist.   Neck: Supple, trachea midline.   Lungs:   Breath sounds heard bilaterally equally.  No wheezing or crackles. Tachypnea. +use of accessory muscles.   Heart:  Normal S1 and S2, no murmur, no gallop, no rub.    Abdomen:   Soft, nontender, nondistended, no rebound tenderness.   Extremities: Supple, no edema, no cyanosis.   Skin: No bleeding or rash.   Neurologic: No facial asymmetry. Moves all four limbs. No tremors.      Laboratory data:    I have reviewed the labs done in the emergency room.    Results from last 7 days   Lab Units 05/25/22  1850   SODIUM mmol/L 134*   POTASSIUM mmol/L 4.4   CHLORIDE mmol/L 101   CO2 mmol/L 21.5*   BUN mg/dL 24*   CREATININE mg/dL 1.49*   CALCIUM mg/dL 8.6   BILIRUBIN mg/dL 0.5   ALK PHOS U/L 69   ALT (SGPT) U/L 35   AST (SGOT) U/L 43*   GLUCOSE mg/dL 166*     Results from last 7 days   Lab " Units 05/25/22  1850   WBC 10*3/mm3 8.63   HEMOGLOBIN g/dL 12.2*   HEMATOCRIT % 36.7*   PLATELETS 10*3/mm3 249         Results from last 7 days   Lab Units 05/25/22  1850   TROPONIN T ng/mL <0.010     Results from last 7 days   Lab Units 05/25/22  1850   PROBNP pg/mL 194.2             Results from last 7 days   Lab Units 05/25/22  1917   PH, ARTERIAL pH units 7.472*   PO2 ART mm Hg 56.0*   PCO2, ARTERIAL mm Hg 31.1*   HCO3 ART mmol/L 22.7           Invalid input(s): USDES,  BLOODU, NITRITITE, BACT, EP    Pain Management Panel    There is no flowsheet data to display.         Radiology:    CT Angiogram Chest Pulmonary Embolism    Result Date: 5/25/2022  FINAL REPORT TECHNIQUE: Thin section axial images were obtained through the chest and during the arterial phase of IV contrast administration. Coronal 3-D MIP reconstructed images were also provided. CLINICAL HISTORY: recent covid soa FINDINGS: The pulmonary arteries are well-opacified and there is no filling defect to indicate pulmonary embolism. The thoracic aorta is normal. There are multifocal bilateral patchy areas of groundglass/airspace opacity consistent with pneumonia, including viral pneumonia. There is no pleural disease, adenopathy or significant osseous abnormality.  In the upper abdomen, there are multiple dependent gallstones in an otherwise normal gallbladder.     Viral pneumonia. No pulmonary embolism.  Gallstones. Authenticated by James Pedraza M.D. on 05/25/2022 10:29:36 PM      Assessment/Plan:    Acute respiratory failure without hypoxia  -Secondary to COVID-19 pneumonia  -Will monitor on continuous pulse oximetry.  -Will provide supplemental oxygen as needed to maintain SpO2 >/=90%.  -The patient has already completed almost 2 weeks of an antibiotic, the name of which he does not remember. He also received Rocephin and azithromycin in the ED. I will hold off on starting an antibiotic.     Acute kidney injury  -Creatinine level is 1.49 (no baseline).  BUN is 24.   -Likely secondary to decreased oral intake.  -The patient received NS x 500mL in the ED. Will start NS @ 100mL/hr.  -Will recheck Cr in the morning.    Type 2 diabetes mellitus  -Will order a hemoglobin a1c level.  -Start POC glucose TID AC  -Start sliding scale, low dose.    The patient's wife will provide a home medication list in the morning.     Risk Assessment: Moderate to High  DVT Prophylaxis: Heparin subcu  Code Status: DNR/DNI  Diet: Cardiac/Consistent Carb      Matthew Cunningham MD  05/25/22  23:34 EDT    Electronically signed by Matthew Cunningham MD at 05/26/22 0615          Emergency Department Notes      Dahlia Xie, RN at 05/25/22 1850        This nurse attempted to start an IV and get blood work. Was unsuccessful. Lab called.     Electronically signed by Dahlia Xie RN at 05/25/22 1850     Gris Lisa RN at 05/25/22 2008        Patient's O2 sat dropped to 82% on room air while getting up to urinate. Patient placed on 4L via NC to keep O2 sat above 90%. Will continue to monitor.     Electronically signed by Gris Lisa, RN at 05/25/22 2008     Jair Starkey Jr., PA-C at 05/25/22 8195     Attestation signed by Myke Vieyra DO at 05/26/22 0326        NON FACE TO FACE: This visit was performed by BOTH a physician and an APC. I performed all aspects of the MDM as documented.  Myke Vieyra DO 5/26/2022 03:26 EDT                         Subjective   History of Present Illness    Review of Systems    Past Medical History:   Diagnosis Date   • Diabetes mellitus (HCC)    • Hypertension        No Known Allergies    Past Surgical History:   Procedure Laterality Date   • HERNIA REPAIR         History reviewed. No pertinent family history.    Social History     Socioeconomic History   • Marital status:    Tobacco Use   • Smoking status: Former Smoker   Vaping Use   • Vaping Use: Never used   Substance and Sexual Activity   • Alcohol use: Never   • Drug use: Never   • Sexual  activity: Defer           Objective   Physical Exam    Procedures          ED Course  ED Course as of 05/25/22 2318   Wed May 25, 2022   2039 EKG interpreted by me reveals sinus rhythm rate of 72.  Right bundle branch block with nonspecific T wave change.  No ectopy no ischemic change. [PF]   7081 Discussed the case with Dr. Oliver, he accepted the patient for admission [CS]      ED Course User Index  [CS] Jair Starkey Jr., IAN  [PF] Myke Vieyra DO                                                 MDM  Number of Diagnoses or Management Options  GOMEZ (acute kidney injury) (HCC): new and requires workup  Hypoxia: new and requires workup  Pneumonia due to COVID-19 virus: new and requires workup     Amount and/or Complexity of Data Reviewed  Clinical lab tests: reviewed  Tests in the radiology section of CPT®: reviewed  Discuss the patient with other providers: yes    Risk of Complications, Morbidity, and/or Mortality  Presenting problems: moderate  Diagnostic procedures: moderate  Management options: moderate    Patient Progress  Patient progress: stable      Final diagnoses:   Pneumonia due to COVID-19 virus   Hypoxia   GOMEZ (acute kidney injury) (HCC)       ED Disposition  ED Disposition     ED Disposition   Decision to Admit    Condition   --    Comment   Level of Care: Telemetry [5]   Diagnosis: Pneumonia due to COVID-19 virus [[id number]]   Admitting Physician: WILLIAN OLIVER [[id number]]   Attending Physician: WILLIAN OLIVER [[id number]]   Certification: I Certify That Inpatient Hospital Services Are Medically Necessary For Greater Than 2 Midnights               No follow-up provider specified.       Medication List      No changes were made to your prescriptions during this visit.          Jair Starkey Jr., PA-C  05/25/22 2318      Electronically signed by Myke Vieyra DO at 05/26/22 8352     Aamir Talley at 05/25/22 8162        House supervisor called about admission at this time. Currently no bed available, pt  will remain in ED.     Electronically signed by Aamir Talley at 05/25/22 2326     Valery Kaye, RN at 05/26/22 0905        Spoke to daughter states pt wife to bring med list in     Electronically signed by Valery Kaye RN at 05/26/22 1059     Pranav Mcintosh PCT at 05/26/22 0931        Mr. Mobley had a Glucose of 161    Electronically signed by Pranav Mcintosh PCT at 05/26/22 0931     Valery Kaye RN at 05/26/22 0934        Emptied pt urinal    Electronically signed by Valery Kaye RN at 05/26/22 0934     Valery Kaye RN at 05/26/22 1019        Spoke to daughter     Electronically signed by Valery Kaye RN at 05/26/22 1019     Pranav Mcintosh PCT at 05/26/22 1117        Mr. Mobley had a Glucose of 142    Electronically signed by Pranav Mcintosh PCT at 05/26/22 1118     Suzette Mcintosh at 05/26/22 1234        At this time, 3rd floor called to get report on pt.    Electronically signed by Suzette Mcintosh at 05/26/22 1235     Valery Kaye RN at 05/26/22 1241        Report to Radha on the floor waiting on transport     Electronically signed by Valery Kaye RN at 05/26/22 1242         Current Facility-Administered Medications   Medication Dose Route Frequency Provider Last Rate Last Admin   • acetaminophen (TYLENOL) tablet 650 mg  650 mg Oral Q4H PRN Matthew Cunningham MD   650 mg at 05/29/22 0032   • albuterol sulfate HFA (PROVENTIL HFA;VENTOLIN HFA;PROAIR HFA) inhaler 2 puff  2 puff Inhalation 4x Daily - RT Matthew Cunningham MD   2 puff at 06/10/22 1300   • amLODIPine (NORVASC) tablet 10 mg  10 mg Oral Daily Abdoulaye Alcantara MD   10 mg at 06/10/22 0835   • ascorbic acid (VITAMIN C) tablet 500 mg  500 mg Oral Daily Abdoulaye Alcantara MD   500 mg at 06/10/22 0835   • budesonide-formoterol (SYMBICORT) 160-4.5 MCG/ACT inhaler 2 puff  2 puff Inhalation BID - RT Werner Ace MD   2 puff at 06/10/22 0835   • carvedilol (COREG) tablet 12.5 mg  12.5 mg Oral BID With Meals Abdoulaye Alcantara MD   12.5 mg at  06/10/22 0836   • dextrose (D50W) (25 g/50 mL) IV injection 25 g  25 g Intravenous Q15 Min PRN Matthew Cunningham MD       • dextrose (GLUTOSE) oral gel 1 tube  1 tube Oral Q15 Min PRN Matthew Cunningham MD       • glucagon (human recombinant) (GLUCAGEN DIAGNOSTIC) injection 1 mg  1 mg Subcutaneous PRN Matthew Cunningham MD       • heparin (porcine) 5000 UNIT/ML injection 5,000 Units  5,000 Units Subcutaneous Q12H Matthew Cunningham MD   5,000 Units at 06/10/22 0837   • hydrALAZINE (APRESOLINE) injection 10 mg  10 mg Intravenous Q6H PRN Matthew Cunningham MD   10 mg at 22 0958   • Insulin Aspart (novoLOG) injection 0-7 Units  0-7 Units Subcutaneous TID AC Matthew Cunningham MD   4 Units at 06/10/22 1231   • insulin detemir (LEVEMIR) injection 20 Units  20 Units Subcutaneous Q12H Brayden Fischer MD   20 Units at 06/10/22 0955   • melatonin tablet 5 mg  5 mg Oral Nightly PRN Matthew Cunningham MD   5 mg at 22 2021   • multivitamin with minerals 1 tablet  1 tablet Oral Daily Abdoulaye Alcantara MD   1 tablet at 06/10/22 0835   • ondansetron (ZOFRAN) tablet 4 mg  4 mg Oral Q6H PRN Matthew Cunningham MD        Or   • ondansetron (ZOFRAN) injection 4 mg  4 mg Intravenous Q6H PRN Matthew Cunningham MD       • polyethylene glycol (MIRALAX) packet 17 g  17 g Oral Daily Abdoulaye Alcantara MD   17 g at 06/10/22 0837   • sennosides-docusate (PERICOLACE) 8.6-50 MG per tablet 1 tablet  1 tablet Oral BID Abdoulaye Alcantara MD   1 tablet at 06/10/22 0838   • sodium chloride 0.9 % flush 10 mL  10 mL Intravenous Q12H Matthew Cunningham MD   10 mL at 06/10/22 0838   • sodium chloride 0.9 % flush 10 mL  10 mL Intravenous PRN Matthew Cunningham MD   10 mL at 22 1141   • tamsulosin (FLOMAX) 24 hr capsule 0.4 mg  0.4 mg Oral Nightly Abdoulaey Alcantara MD   0.4 mg at 22 2557        Physician Progress Notes (last 24 hours)      Abdoulaye Alcantara MD at 06/10/22 1554              [facility]    Name:  Gene Mobley   Age:  89 y.o.  Sex:  male  :  3/14/1933  MRN:  [id number]   Visit  Number:  [id number]  Admission Date:  5/25/2022  Date Of Service:  06/10/22  Primary Care Physician:  Provider, No Known     LOS: 16 days :    Chief Complaint:      Generalized weakness.    Subjective:    Mr. Mobley was seen and examined this morning.  He is currently lying down on the bed and eating his breakfast.  He is doing better and was able to move all 4 extremities.  He is eager to get out of bed and work with physical therapy today.  Denies any chest pain or headache.  He is currently on 4 L of nasal cannula oxygen and saturating in the mid 90s.    Hospital Course:    Mr. Mobley is an 89-year-old male with history of hypertension, diabetes mellitus type 2 was admitted from the emergency room with cough, shortness of breath for 2 weeks.  Patient apparently was diagnosed with COVID-19 approximately 10 days prior to admission by his primary care provider and was treated with antibiotics therapy and steroids.  In the emergency room, his initial temperature was 99.1, pulse 69, blood pressure 171/69 and pulse oxygen saturation of 92% on room air.  His pulse oxygen saturation subsequently decreased into the low 80s and he was placed on 5 L of nasal cannula oxygen.  Blood work done in the emergency room revealed a creatinine of 1.49 (baseline) and hemoglobin of 12.  Patient received IV fluids and was treated symptomatically in the emergency room.  He was not continued on antibiotics therapy and was not placed on steroids.  His home medications for blood pressure were restarted.  Patient's FiO2 requirements did bump up and he had to be placed on 15 L nasal cannula oxygen the next day.  He subsequently slowly improved with a decrease in his FiO2 requirements.  He was noted to have elevated blood sugars and his Levemir dose was adjusted accordingly.    Unfortunately, patient's oxygen requirements continued to worsen through the hospital stay and he was started on IV dexamethasone.  His procalcitonin remain negative and  he was not initiated on any antibiotics.  Due to steroid therapy, his blood sugars did worsen and his Levemir dose was uptitrated accordingly.  He was seen by Dr. Ace from pulmonology.  Patient improved slowly over the course of the next 1 week and his oxygen was down titrated to 8 L.  He was continued on physical and occupational therapy.  Patient had an assisted fall on 6/9/2022 without any obvious trauma.  His CT of the head was done which did not show any acute abnormalities.    Review of Systems:     All systems were reviewed and negative except as mentioned in subjective, assessment and plan.    Vital Signs:    Temp:  [96 °F (35.6 °C)-98 °F (36.7 °C)] 98 °F (36.7 °C)  Heart Rate:  [52-65] 65  Resp:  [16-22] 17  BP: (130-159)/(54-75) 154/68    Intake and output:    I/O last 3 completed shifts:  In: 340 [P.O.:340]  Out: 1400 [Urine:1400]  I/O this shift:  In: 480 [P.O.:480]  Out: 200 [Urine:200]    Physical Examination:    General Appearance:  Alert and cooperative.   Head:  Atraumatic and normocephalic.   Eyes: Conjunctivae and sclerae normal, no icterus. No pallor.   Throat: No oral lesions, no thrush, oral mucosa moist.   Neck: Supple, trachea midline, no thyromegaly.   Lungs:   Breath sounds heard bilaterally equally.  No wheezing.  Bilateral scattered crackles heard.  No Pleural rub or bronchial breathing.   Heart:  Normal S1 and S2, no murmur, no gallop, no rub. No JVD.   Abdomen:   Normal bowel sounds, no masses, no organomegaly. Soft, nontender, nondistended, no rebound tenderness.   Extremities: Supple, 1+ pitting ankle edema, no cyanosis, no clubbing.   Skin: No bleeding or rash.   Neurologic: Alert and oriented x 3. No facial asymmetry. Moves all four limbs but does have generalized weakness. No tremors.      Laboratory results:    Results from last 7 days   Lab Units 06/10/22  0744 06/09/22  0644 06/08/22  0637   SODIUM mmol/L 136 136 135*   POTASSIUM mmol/L 4.6 4.4 4.9   CHLORIDE mmol/L 103 101 99    CO2 mmol/L 23.0 22.4 24.8   BUN mg/dL 59* 79* 74*   CREATININE mg/dL 1.25 1.37* 1.48*   CALCIUM mg/dL 8.4* 9.2 8.9   BILIRUBIN mg/dL  --  0.4  --    ALK PHOS U/L  --  71  --    ALT (SGPT) U/L  --  29  --    AST (SGOT) U/L  --  13  --    GLUCOSE mg/dL 143* 190* 282*     Results from last 7 days   Lab Units 06/09/22  0644 06/08/22  0637   WBC 10*3/mm3 13.12* 16.63*   HEMOGLOBIN g/dL 12.5* 12.1*   HEMATOCRIT % 36.7* 35.2*   PLATELETS 10*3/mm3 285 326     I have reviewed the patient's laboratory results.    Radiology results:    CT Head Without Contrast    Result Date: 6/9/2022  PROCEDURE: CT HEAD WO CONTRAST-  HISTORY: Fall and confusion; U07.1-COVID-19; J12.82-Pneumonia due to coronavirus disease 2019; R09.02-Hypoxemia; N17.9-Acute kidney failure, unspecified, altered mental status. Confusion.  TECHNIQUE: Noncontrast exam  FINDINGS:  Moderate atrophy and chronic ischemic white matter changes are noted.  No cortical edema is present. There is no mass or hemorrhage. Ventricles are normal.  Bone windows show no skull fracture or obvious destructive lesion.      Impression: 1. No acute intracranial abnormality or obvious mass. 2. Atrophy and chronic ischemic white matter changes as above.   This study was performed with techniques to keep radiation doses as low as reasonably achievable (ALARA). Individualized dose reduction techniques using automated exposure control or adjustment of vA and/or kV according to the patient size were employed.    This report was signed and finalized on 6/9/2022 8:53 AM by Tucker Norris MD.    Medication Review:     I have reviewed the patient's active and prn medications.     Problem List:      Acute respiratory failure with hypoxia (HCC)    GOMEZ (acute kidney injury) (HCC)    Essential hypertension    Assessment:    1. Acute hypoxic respiratory failure, POA.  2. Bronchitis secondary to COVID-19 infection, POA, improving.  3. Chronic kidney disease stage II.  4. Cholelithiasis noted on CT  scan.  5. Essential hypertension.  6. Diabetes mellitus type 2.    Plan:    Respiratory failure/COVID-19.  -  Continue high flow oxygen at 4 L.  -  Continue physical and occupational therapy.  -  2D echocardiogram done during this hospitalization showed normal left ventricular ejection fraction at 65% with grade 1 diastolic dysfunction.  -  He has completed a 10-day course of dexamethasone.  -  Continue multivitamins and vitamin C.    Chronic kidney disease/hypertension.  - Renal function is at baseline.  - Continue home medications including carvedilol and amlodipine.  - We will hold losartan for another day.  - Carvedilol has been increased to 12.5 mg twice daily.    Diabetes mellitus type 2.  - Blood sugar control has improved with discontinuation of steroids.  - Continue Levemir 20 units twice daily.  - Continue subcutaneous insulin protocol for coverage.  - Hemoglobin A1c 8.4 on 5/26/2022.    Patient needs to be continued on physical and occupational therapy.    Discussed with nursing staff and multidisciplinary team.    DVT Prophylaxis: Heparin  Code Status: DNR/DNI  Diet: Cardiac diabetic  Discharge Plan: Pending- short-term rehab placement when bed becomes available.    Abdoulaye Alcantara MD  06/10/22  15:54 EDT    Dictated utilizing Dragon dictation.      Electronically signed by Abdoulaye Alcantara MD at 06/10/22 2011       Physical Therapy Notes (last 24 hours)  Notes from 06/09/22 1700 through 06/10/22 1700   No notes exist for this encounter.         Occupational Therapy Notes (last 24 hours)  Notes from 06/09/22 1700 through 06/10/22 1700   No notes exist for this encounter.

## 2022-06-10 NOTE — PLAN OF CARE
Goal Outcome Evaluation: Patient oxygen titrated to 3liters nasal cannula oxygen saturation currently 93%. Encouraged ROM exercises, assisted up to recliner today 2 person max assist, generalized weakness noted. Educated on medication and safety measures, bed alarm in use.

## 2022-06-10 NOTE — PLAN OF CARE
Goal Outcome Evaluation:  Plan of Care Reviewed With: patient        Progress: no change   VSS. Patient has rested most of the night. Maintaining sats greater than 90% on 4L NC. Will continue to monitor.

## 2022-06-10 NOTE — CONSULTS
"Adult Nutrition  Assessment/PES    Patient Name:  Gene Mobley  YOB: 1933  MRN: [id number]  Admit Date:  5/25/2022    Assessment Date:  6/10/2022    Comments:    Pt w/ continued poor po intake at mealtimes and w/ skin breakdown. RD to recommend initiating enteral nutrition in order to meet increased estimated needs r/t skin breakdown.     Rec#1: When/if medically appropriate, initiate Novasource Renal @ 20mL/hr and advance 10mL q8hrs to goal rate of 53mL x 22hrs.     Rec#2: Free water flush 140mL q4hrs.     Total TF regimen to provide: 2332 kcals, 106g protein, and 1676 mL fluid/day.     Monitor and replace electrolytes PRN. RD to follow-up and available PRN.      Reason for Assessment     Row Name 06/10/22 0852          Reason for Assessment    Reason For Assessment per organizational policy;identified at risk by screening criteria;other (see comments);follow-up protocol  LOS > 7 days     Diagnosis diabetes diagnosis/complications;cardiac disease;pulmonary disease;infection/sepsis;renal disease;other (see comments)  COVID, HTN, GOMEZ/CKD, DM     Identified At Risk by Screening Criteria large or nonhealing wound, burn or pressure injury                  Anthropometrics     Row Name 06/10/22 0856          Anthropometrics    Height 175.3 cm (69.02\")                Labs/Tests/Procedures/Meds     Row Name 06/10/22 0852          Labs/Procedures/Meds    Lab Results Reviewed reviewed, pertinent     Lab Results Comments High: glucose, BUN            Medications    Pertinent Medications Reviewed reviewed, pertinent     Pertinent Medications Comments ascorbic acid, insulin, MVI w/ minerals, docusate                Physical Findings     Row Name 06/10/22 0854          Physical Findings    Overall Physical Appearance obese, +1 trace generalized edema, B/L gluteal MASD, L heel wound, B/L ear PI                Estimated/Assessed Needs - Anthropometrics     Row Name 06/10/22 0856          Anthropometrics    Height " "175.3 cm (69.02\")                Nutrition Prescription Ordered     Row Name 06/10/22 0855          Nutrition Prescription PO    Current PO Diet Regular     Supplement ProStat;Arginaid;Boost Glucose Control (Glucerna Shake)     Supplement Frequency 3 times a day     Common Modifiers Cardiac;Consistent Carbohydrate                Evaluation of Received Nutrient/Fluid Intake     Row Name 06/10/22 0856          Intake Assessment    Energy/Calorie Requirement Assessment not meeting needs     Protein Requirement Assessment not meeting needs            Vitals    Height 175.3 cm (69.02\")                     Problem/Interventions:   Problem 1     Row Name 06/10/22 0856          Nutrition Diagnoses Problem 1    Problem 1 Inadequate Nutrient Intake     Etiology (related to) Medical Diagnosis     Pulmonary/Critical Care Other (comment);Acute respiratory failure  COVID     Signs/Symptoms (evidenced by) PO Intake;Unintended Weight Change     Percent (%) intake recorded 44 %     Over number of meals 8     Unintended Weight Change Loss     Number of Pounds Lost 9     Weight loss time period one week                      Intervention Goal     Row Name 06/10/22 0856          Intervention Goal    General Maintain nutrition;Nutrition support treatment;Improved nutrition related lab(s);Meet nutritional needs for age/condition;Disease management/therapy     TF/PN Establish TF tolerance;Maintain TF/PN;Inititiate TF/PN     Weight Maintain weight                Nutrition Intervention     Row Name 06/10/22 0857          Nutrition Intervention    RD/Tech Action Follow Tx progress;Care plan reviewd;Recommend/ordered     Recommended/Ordered EN                Nutrition Prescription     Row Name 06/10/22 0857          Nutrition Prescription EN    Enteral Prescription Enteral begin/change     Enteral Route NG     Product Diabetisource     TF Delivery Method Continuous     Continuous TF Goal Rate (mL/hr) 53 mL/hr     Continuous TF Starting Rate " (mL/hr) 20 mL/hr     Continuous TF Goal Volume (mL) 1166 mL     Continuous TF Starting Volume (mL) 440 mL     Water flush (mL)  140 mL     Water Flush Frequency Every 4 hours     New EN Prescription Ordered? Yes            Other Orders    Obtain Weight Daily     Obtain Weight Ordered? No, recommended     Supplement Vitamin mineral supplement     Supplement Ordered? No, recommended     Labs Mg++;Na+;K+     Labs Ordered? No, recommended     Other Monitor and replace electrolytes PRN.                Education/Evaluation     Row Name 06/10/22 0902          Education    Education Will Instruct as appropriate            Monitor/Evaluation    Monitor Per protocol;TF delivery/tolerance;Pertinent labs;Weight;Skin status;Symptoms                 Electronically signed by:  RUSSEL SWANSON RD  06/10/22 09:02 EDT

## 2022-06-10 NOTE — PROGRESS NOTES
[facility]    Name:  Gene Mobley   Age:  89 y.o.  Sex:  male  :  3/14/1933  MRN:  [id number]   Visit Number:  [id number]  Admission Date:  2022  Date Of Service:  06/10/22  Primary Care Physician:  Provider, No Known     LOS: 16 days :    Chief Complaint:      Generalized weakness.    Subjective:    Mr. Mobley was seen and examined this morning.  He is currently lying down on the bed and eating his breakfast.  He is doing better and was able to move all 4 extremities.  He is eager to get out of bed and work with physical therapy today.  Denies any chest pain or headache.  He is currently on 4 L of nasal cannula oxygen and saturating in the mid 90s.    Hospital Course:    Mr. Mobley is an 89-year-old male with history of hypertension, diabetes mellitus type 2 was admitted from the emergency room with cough, shortness of breath for 2 weeks.  Patient apparently was diagnosed with COVID-19 approximately 10 days prior to admission by his primary care provider and was treated with antibiotics therapy and steroids.  In the emergency room, his initial temperature was 99.1, pulse 69, blood pressure 171/69 and pulse oxygen saturation of 92% on room air.  His pulse oxygen saturation subsequently decreased into the low 80s and he was placed on 5 L of nasal cannula oxygen.  Blood work done in the emergency room revealed a creatinine of 1.49 (baseline) and hemoglobin of 12.  Patient received IV fluids and was treated symptomatically in the emergency room.  He was not continued on antibiotics therapy and was not placed on steroids.  His home medications for blood pressure were restarted.  Patient's FiO2 requirements did bump up and he had to be placed on 15 L nasal cannula oxygen the next day.  He subsequently slowly improved with a decrease in his FiO2 requirements.  He was noted to have elevated blood sugars and his Levemir dose was adjusted accordingly.    Unfortunately,  patient's oxygen requirements continued to worsen through the hospital stay and he was started on IV dexamethasone.  His procalcitonin remain negative and he was not initiated on any antibiotics.  Due to steroid therapy, his blood sugars did worsen and his Levemir dose was uptitrated accordingly.  He was seen by Dr. Ace from pulmonology.  Patient improved slowly over the course of the next 1 week and his oxygen was down titrated to 8 L.  He was continued on physical and occupational therapy.  Patient had an assisted fall on 6/9/2022 without any obvious trauma.  His CT of the head was done which did not show any acute abnormalities.    Review of Systems:     All systems were reviewed and negative except as mentioned in subjective, assessment and plan.    Vital Signs:    Temp:  [96 °F (35.6 °C)-98 °F (36.7 °C)] 98 °F (36.7 °C)  Heart Rate:  [52-65] 65  Resp:  [16-22] 17  BP: (130-159)/(54-75) 154/68    Intake and output:    I/O last 3 completed shifts:  In: 340 [P.O.:340]  Out: 1400 [Urine:1400]  I/O this shift:  In: 480 [P.O.:480]  Out: 200 [Urine:200]    Physical Examination:    General Appearance:  Alert and cooperative.   Head:  Atraumatic and normocephalic.   Eyes: Conjunctivae and sclerae normal, no icterus. No pallor.   Throat: No oral lesions, no thrush, oral mucosa moist.   Neck: Supple, trachea midline, no thyromegaly.   Lungs:   Breath sounds heard bilaterally equally.  No wheezing.  Bilateral scattered crackles heard.  No Pleural rub or bronchial breathing.   Heart:  Normal S1 and S2, no murmur, no gallop, no rub. No JVD.   Abdomen:   Normal bowel sounds, no masses, no organomegaly. Soft, nontender, nondistended, no rebound tenderness.   Extremities: Supple, 1+ pitting ankle edema, no cyanosis, no clubbing.   Skin: No bleeding or rash.   Neurologic: Alert and oriented x 3. No facial asymmetry. Moves all four limbs but does have generalized weakness. No tremors.      Laboratory results:    Results from  last 7 days   Lab Units 06/10/22  0744 06/09/22  0644 06/08/22  0637   SODIUM mmol/L 136 136 135*   POTASSIUM mmol/L 4.6 4.4 4.9   CHLORIDE mmol/L 103 101 99   CO2 mmol/L 23.0 22.4 24.8   BUN mg/dL 59* 79* 74*   CREATININE mg/dL 1.25 1.37* 1.48*   CALCIUM mg/dL 8.4* 9.2 8.9   BILIRUBIN mg/dL  --  0.4  --    ALK PHOS U/L  --  71  --    ALT (SGPT) U/L  --  29  --    AST (SGOT) U/L  --  13  --    GLUCOSE mg/dL 143* 190* 282*     Results from last 7 days   Lab Units 06/09/22  0644 06/08/22  0637   WBC 10*3/mm3 13.12* 16.63*   HEMOGLOBIN g/dL 12.5* 12.1*   HEMATOCRIT % 36.7* 35.2*   PLATELETS 10*3/mm3 285 326     I have reviewed the patient's laboratory results.    Radiology results:    CT Head Without Contrast    Result Date: 6/9/2022  PROCEDURE: CT HEAD WO CONTRAST-  HISTORY: Fall and confusion; U07.1-COVID-19; J12.82-Pneumonia due to coronavirus disease 2019; R09.02-Hypoxemia; N17.9-Acute kidney failure, unspecified, altered mental status. Confusion.  TECHNIQUE: Noncontrast exam  FINDINGS:  Moderate atrophy and chronic ischemic white matter changes are noted.  No cortical edema is present. There is no mass or hemorrhage. Ventricles are normal.  Bone windows show no skull fracture or obvious destructive lesion.      Impression: 1. No acute intracranial abnormality or obvious mass. 2. Atrophy and chronic ischemic white matter changes as above.   This study was performed with techniques to keep radiation doses as low as reasonably achievable (ALARA). Individualized dose reduction techniques using automated exposure control or adjustment of vA and/or kV according to the patient size were employed.    This report was signed and finalized on 6/9/2022 8:53 AM by Tucker Norris MD.    Medication Review:     I have reviewed the patient's active and prn medications.     Problem List:      Acute respiratory failure with hypoxia (HCC)    GOMEZ (acute kidney injury) (HCC)    Essential hypertension    Assessment:    1. Acute hypoxic  respiratory failure, POA.  2. Bronchitis secondary to COVID-19 infection, POA, improving.  3. Chronic kidney disease stage II.  4. Cholelithiasis noted on CT scan.  5. Essential hypertension.  6. Diabetes mellitus type 2.    Plan:    Respiratory failure/COVID-19.  -  Continue high flow oxygen at 4 L.  -  Continue physical and occupational therapy.  -  2D echocardiogram done during this hospitalization showed normal left ventricular ejection fraction at 65% with grade 1 diastolic dysfunction.  -  He has completed a 10-day course of dexamethasone.  -  Continue multivitamins and vitamin C.    Chronic kidney disease/hypertension.  - Renal function is at baseline.  - Continue home medications including carvedilol and amlodipine.  - We will hold losartan for another day.  - Carvedilol has been increased to 12.5 mg twice daily.    Diabetes mellitus type 2.  - Blood sugar control has improved with discontinuation of steroids.  - Continue Levemir 20 units twice daily.  - Continue subcutaneous insulin protocol for coverage.  - Hemoglobin A1c 8.4 on 5/26/2022.    Patient needs to be continued on physical and occupational therapy.    Discussed with nursing staff and multidisciplinary team.    DVT Prophylaxis: Heparin  Code Status: DNR/DNI  Diet: Cardiac diabetic  Discharge Plan: Pending- short-term rehab placement when bed becomes available.    Abdoulaye Alcantara MD  06/10/22  15:54 EDT    Dictated utilizing Dragon dictation.

## 2022-06-11 NOTE — PLAN OF CARE
Goal Outcome Evaluation:  No complaints from patient overnight, remains on 3LNC   Problem: Adult Inpatient Plan of Care  Goal: Plan of Care Review  6/11/2022 0148 by Teresita Jones, RN  Outcome: Ongoing, Progressing

## 2022-06-11 NOTE — PLAN OF CARE
Goal Outcome Evaluation:  Plan of Care Reviewed With: patient        Progress: improving  Outcome Evaluation: Performed B LE ex in reclined heel slides, hip abd, QS, AP and sitting TKE 1x10 reps with rest breaks. Performed supine to sit with CGA, sit<->stand with CGA/min A and RW and VC for hand placement, and amb 10 feet.  Pt on 3 L O2 with SATS 88-93% with VC to breathe in thru nose. Pt required frequent rest breaks with mob d/t SOA with O2 SATS briefly dropping to 87% with deep breath increased to 88%. Pt's O2 SATS at rest 93-94%. Con't with PT POC and progress as tolerated

## 2022-06-11 NOTE — THERAPY TREATMENT NOTE
Patient Name: Gene Mobley  : 3/14/1933    MRN: [id number]                              Today's Date: 2022       Admit Date: 2022    Visit Dx:     ICD-10-CM ICD-9-CM   1. Pneumonia due to COVID-19 virus  U07.1 480.8    J12.82 079.89   2. Hypoxia  R09.02 799.02   3. GOMEZ (acute kidney injury) (HCC)  N17.9 584.9     Patient Active Problem List   Diagnosis   • GOMEZ (acute kidney injury) (HCC)   • Acute respiratory failure with hypoxia (HCC)   • Essential hypertension     Past Medical History:   Diagnosis Date   • Diabetes mellitus (HCC)    • Hypertension    • Impaired functional mobility, balance, gait, and endurance      Past Surgical History:   Procedure Laterality Date   • HERNIA REPAIR        General Information     Row Name 22 1505          Physical Therapy Time and Intention    Document Type therapy note (daily note)  -     Mode of Treatment physical therapy  -     Row Name 22 1505          General Information    Patient Profile Reviewed yes  -CC     Existing Precautions/Restrictions fall;oxygen therapy device and L/min  -CC     Row Name 22 1505          Cognition    Orientation Status (Cognition) oriented x 4  -CC     Row Name 22 1505          Safety Issues, Functional Mobility    Safety Issues Affecting Function (Mobility) positioning of assistive device;safety precautions follow-through/compliance  -     Impairments Affecting Function (Mobility) balance;endurance/activity tolerance;shortness of breath;strength  -           User Key  (r) = Recorded By, (t) = Taken By, (c) = Cosigned By    Initials Name Provider Type    CC Charity Daily, PTA Physical Therapist Assistant               Mobility     Row Name 22 1506          Bed Mobility    Bed Mobility rolling left;rolling right  -CC     Rolling Left [place] (Bed Mobility) standby assist  -CC     Rolling Right [place] (Bed Mobility) standby assist  -CC     Supine-Sit [place] (Bed Mobility)  contact guard  -     Assistive Device (Bed Mobility) head of bed elevated  -     Row Name 06/11/22 1506          Sit-Stand Transfer    Sit-Stand [place] (Transfers) contact guard;minimum assist (75% patient effort);verbal cues  -     Assistive Device (Sit-Stand Transfers) walker, front-wheeled  -CC     Row Name 06/11/22 1506          Gait/Stairs (Locomotion)    [place] (Gait) contact guard  -     Assistive Device (Gait) walker, front-wheeled  -     Distance in Feet (Gait) 10  -CC           User Key  (r) = Recorded By, (t) = Taken By, (c) = Cosigned By    Initials Name Provider Type     Charity Daily, HAYLIE Physical Therapist Assistant               Obj/Interventions     Row Name 06/11/22 1509          Strength Comprehensive (MMT)    General Manual Muscle Testing (MMT) Assessment lower extremity strength deficits identified  -     Comment, General Manual Muscle Testing (MMT) Assessment Performed B LE ex in reclined heel slides, hip abd, QS, AP and sitting TKE 1x10 reps with rest breaks  -           User Key  (r) = Recorded By, (t) = Taken By, (c) = Cosigned By    Initials Name Provider Type     Charity Daily, HAYLIE Physical Therapist Assistant               Goals/Plan    No documentation.                Clinical Impression     [facility] Name 06/11/22 1511          Pain    Pretreatment Pain Rating 0/10 - no pain  -     Posttreatment Pain Rating 0/10 - no pain  -     Additional Documentation Pain Scale: Numbers Pre/Post-Treatment (Group)  -[facility] Name 06/11/22 1511          Plan of Care Review    Progress improving  -     Outcome Evaluation Performed B LE ex in reclined heel slides, hip abd, QS, AP and sitting TKE 1x10 reps with rest breaks. Performed supine to sit with CGA, sit<->stand with CGA/min A and RW and VC for hand placement, and amb 10 feet.  Pt on 3 L O2 with SATS 88-93% with VC to breathe in thru nose. Pt required frequent rest breaks with mob d/t SOA with O2 SATS  briefly dropping to 87% with deep breath increased to 88%. Pt's O2 SATS at rest 93-94%. Con't with PT POC and progress as tolerated  -CC     Row Name 06/11/22 1511          Positioning and Restraints    Pre-Treatment Position in bed  -CC     Post Treatment Position chair  -CC     In Chair reclined;call light within reach;encouraged to call for assist  -CC           User Key  (r) = Recorded By, (t) = Taken By, (c) = Cosigned By    Initials Name Provider Type    Charity Smith PTA Physical Therapist Assistant               Outcome Measures     Row Name 06/11/22 1515          How much help from another person do you currently need...    Turning from your back to your side while in flat bed without using bedrails? 4  -CC     Moving from lying on back to sitting on the side of a flat bed without bedrails? 4  -CC     Moving to and from a bed to a chair (including a wheelchair)? 3  -CC     Standing up from a chair using your arms (e.g., wheelchair, bedside chair)? 3  -CC     Climbing 3-5 steps with a railing? 2  -CC     To walk in hospital room? 3  -CC     AM-PAC 6 Clicks Score (PT) 19  -CC     Highest level of mobility 6 --> Walked 10 steps or more  -     Row Name 06/11/22 1515          Functional Assessment    Outcome Measure Options AM-PAC 6 Clicks Basic Mobility (PT)  -CC           User Key  (r) = Recorded By, (t) = Taken By, (c) = Cosigned By    Initials Name Provider Type    Charity Smith PTA Physical Therapist Assistant                             Physical Therapy Education                 Title: PT OT SLP Therapies (In Progress)     Topic: Physical Therapy (In Progress)     Point: Mobility training (Done)     Learning Progress Summary           Patient Acceptance, E,TB, VU by  at 6/11/2022 1516    Comment: Importance of increasing mobility      Show all documentation for this point (7)                 Point: Home exercise program (Not Started)     Learner Progress:  Not documented in this visit.           Point: Body mechanics (Done)     Learning Progress Summary           Patient Acceptance, E,TB, VU by  at 6/10/2022 1932                   Point: Precautions (Done)     Learning Progress Summary           Patient Acceptance, E,TB, VU by  at 5/30/2022 1436    Comment: importance of not over excerting and maintain O2 levels >90%                               User Key     Initials Effective Dates Name Provider Type Discipline     06/16/21 -  Charity Daily PTA Physical Therapist Assistant PT    EC 05/31/22 -  Marybel Ramirez RN Registered Nurse Nurse              PT Recommendation and Plan     Plan of Care Reviewed With: patient  Progress: improving  Outcome Evaluation: Performed B LE ex in reclined heel slides, hip abd, QS, AP and sitting TKE 1x10 reps with rest breaks. Performed supine to sit with CGA, sit<->stand with CGA/min A and RW and VC for hand placement, and amb 10 feet.  Pt on 3 L O2 with SATS 88-93% with VC to breathe in thru nose. Pt required frequent rest breaks with mob d/t SOA with O2 SATS briefly dropping to 87% with deep breath increased to 88%. Pt's O2 SATS at rest 93-94%. Con't with PT POC and progress as tolerated     Time Calculation:    PT Charges     Row Name 06/11/22 1517             Time Calculation    PT Received On 06/11/22  -CC      PT Goal Re-Cert Due Date 06/17/22  -CC              Time Calculation- PT    Total Timed Code Minutes- PT 45 minute(s)  -CC              Timed Charges    71549 - PT Therapeutic Exercise Minutes 20  -CC      94196 - Gait Training Minutes  10  -CC      13267 - PT Therapeutic Activity Minutes 15  -CC              Total Minutes    Timed Charges Total Minutes 45  -CC       Total Minutes 45  -CC            User Key  (r) = Recorded By, (t) = Taken By, (c) = Cosigned By    Initials Name Provider Type    CC Charity Daily PTA Physical Therapist Assistant              Therapy Charges for Today     Code Description Service Date Service Provider  Modifiers Qty    42604031843 HC GAIT TRAINING EA 15 MIN 6/10/2022 Charity Daily, PTA GP 1    77941818959 HC PT THERAPEUTIC ACT EA 15 MIN 6/10/2022 Charity Daily, PTA GP 1    85474453787 HC PT THER PROC EA 15 MIN 6/11/2022 Charity Daily, PTA GP 1    64798522081 HC GAIT TRAINING EA 15 MIN 6/11/2022 Charity Daily, PTA GP 1    47213474475 HC PT THERAPEUTIC ACT EA 15 MIN 6/11/2022 Charity Daily, PTA GP 1          PT G-Codes  Outcome Measure Options: AM-PAC 6 Clicks Basic Mobility (PT)  AM-PAC 6 Clicks Score (PT): 19  AM-PAC 6 Clicks Score (OT): 16    Charity Daily PTA  6/11/2022

## 2022-06-11 NOTE — PLAN OF CARE
Goal Outcome Evaluation:Patient remains on 3liters nasal cannula, PT/OT following, patient pending placement for discharge. Encouraged incentive spirometry use, ROM exercises for strengthening.           Progress: improving

## 2022-06-11 NOTE — PROGRESS NOTES
[facility]    Name:  Gene Mobley   Age:  89 y.o.  Sex:  male  :  3/14/1933  MRN:  [id number]   Visit Number:  [id number]  Admission Date:  2022  Date Of Service:  22  Primary Care Physician:  Provider, No Known     LOS: 17 days :    Chief Complaint:      Generalized weakness.    Subjective:    Mr. Mobley was seen and examined this morning.  He is currently down to 3 L of nasal cannula oxygen and saturating in the mid 90s.  He is feeling much better today and states that he tat on the chair for 2 hours yesterday.  He is currently eating his breakfast.  Denies any new complaints.    Hospital Course:    Mr. Mobley is an 89-year-old male with history of hypertension, diabetes mellitus type 2 was admitted from the emergency room with cough, shortness of breath for 2 weeks.  Patient apparently was diagnosed with COVID-19 approximately 10 days prior to admission by his primary care provider and was treated with antibiotics therapy and steroids.  In the emergency room, his initial temperature was 99.1, pulse 69, blood pressure 171/69 and pulse oxygen saturation of 92% on room air.  His pulse oxygen saturation subsequently decreased into the low 80s and he was placed on 5 L of nasal cannula oxygen.  Blood work done in the emergency room revealed a creatinine of 1.49 (baseline) and hemoglobin of 12.  Patient received IV fluids and was treated symptomatically in the emergency room.  He was not continued on antibiotics therapy and was not placed on steroids.  His home medications for blood pressure were restarted.  Patient's FiO2 requirements did bump up and he had to be placed on 15 L nasal cannula oxygen the next day.  He subsequently slowly improved with a decrease in his FiO2 requirements.  He was noted to have elevated blood sugars and his Levemir dose was adjusted accordingly.    Unfortunately, patient's oxygen requirements continued to worsen through the hospital  stay and he was started on IV dexamethasone.  His procalcitonin remain negative and he was not initiated on any antibiotics.  Due to steroid therapy, his blood sugars did worsen and his Levemir dose was uptitrated accordingly.  He was seen by Dr. Ace from pulmonology.  Patient improved slowly over the course of the next 1 week and his oxygen was down titrated to 8 L.  He was continued on physical and occupational therapy.  Patient had an assisted fall on 6/9/2022 without any obvious trauma.  His CT of the head was done which did not show any acute abnormalities.    Review of Systems:     All systems were reviewed and negative except as mentioned in subjective, assessment and plan.    Vital Signs:    Temp:  [96.8 °F (36 °C)-98.6 °F (37 °C)] 98.1 °F (36.7 °C)  Heart Rate:  [62-76] 67  Resp:  [16-20] 18  BP: (125-160)/(57-75) 125/57    Intake and output:    I/O last 3 completed shifts:  In: 960 [P.O.:960]  Out: 2250 [Urine:2250]  I/O this shift:  In: 240 [P.O.:240]  Out: 150 [Urine:150]    Physical Examination:    General Appearance:  Alert and cooperative.   Head:  Atraumatic and normocephalic.   Eyes: Conjunctivae and sclerae normal, no icterus. No pallor.   Throat: No oral lesions, no thrush, oral mucosa moist.   Neck: Supple, trachea midline, no thyromegaly.   Lungs:   Breath sounds heard bilaterally equally.  No wheezing or crackles.  No Pleural rub or bronchial breathing.   Heart:  Normal S1 and S2, no murmur, no gallop, no rub. No JVD.   Abdomen:   Normal bowel sounds, no masses, no organomegaly. Soft, nontender, nondistended, no rebound tenderness.   Extremities: Supple, 1+ pitting ankle edema, no cyanosis, no clubbing.   Skin: No bleeding or rash.   Neurologic: Alert and oriented x 3. No facial asymmetry. Moves all four limbs but does have generalized weakness. No tremors.      Laboratory results:    Results from last 7 days   Lab Units 06/10/22  0744 06/09/22  0644 06/08/22  0637   SODIUM mmol/L 136 136  135*   POTASSIUM mmol/L 4.6 4.4 4.9   CHLORIDE mmol/L 103 101 99   CO2 mmol/L 23.0 22.4 24.8   BUN mg/dL 59* 79* 74*   CREATININE mg/dL 1.25 1.37* 1.48*   CALCIUM mg/dL 8.4* 9.2 8.9   BILIRUBIN mg/dL  --  0.4  --    ALK PHOS U/L  --  71  --    ALT (SGPT) U/L  --  29  --    AST (SGOT) U/L  --  13  --    GLUCOSE mg/dL 143* 190* 282*     Results from last 7 days   Lab Units 06/09/22  0644 06/08/22  0637   WBC 10*3/mm3 13.12* 16.63*   HEMOGLOBIN g/dL 12.5* 12.1*   HEMATOCRIT % 36.7* 35.2*   PLATELETS 10*3/mm3 285 326     I have reviewed the patient's laboratory results.    Radiology results:    No radiology results from the last 24 hrs     Medication Review:     I have reviewed the patient's active and prn medications.     Problem List:      Acute respiratory failure with hypoxia (HCC)    GOMEZ (acute kidney injury) (HCC)    Essential hypertension    Assessment:    1. Acute hypoxic respiratory failure, POA.  2. Bronchitis secondary to COVID-19 infection, POA, improving.  3. Chronic kidney disease stage II.  4. Cholelithiasis noted on CT scan.  5. Essential hypertension.  6. Diabetes mellitus type 2.  7. Impaired mobility and ADLs.    Plan:    Respiratory failure/COVID-19.  -  Continue high flow oxygen at 3 L.  -  Continue physical and occupational therapy.  -  2D echocardiogram done during this hospitalization showed normal left ventricular ejection fraction at 65% with grade 1 diastolic dysfunction.  -  He has completed a 10-day course of dexamethasone.  -  Continue multivitamins and vitamin C.    Chronic kidney disease/hypertension.  - Renal function is at baseline.  - Continue home medications including carvedilol and amlodipine.  - We will restart his losartan from tomorrow.  - Carvedilol has been increased to 12.5 mg twice daily.    Diabetes mellitus type 2.  - Blood sugar control has improved with discontinuation of steroids.  - Continue Levemir 20 units twice daily.  - Continue subcutaneous insulin protocol for  coverage.  - Hemoglobin A1c 8.4 on 5/26/2022.    Continue continued on physical and occupational therapy.  Discussed with nursing staff.    I tried to call the patient's wife Melissa over the phone but unfortunately there was no response today.    DVT Prophylaxis: Heparin  Code Status: DNR/DNI  Diet: Cardiac diabetic  Discharge Plan: Pending- short-term rehab placement when bed becomes available.    Abdoulaye Alcantara MD  06/11/22  11:58 EDT    Dictated utilizing Dragon dictation.

## 2022-06-12 NOTE — PLAN OF CARE
Goal Outcome Evaluation:  Plan of Care Reviewed With: patient        Progress: improving  Outcome Evaluation: Performed supine to R sidelying to sit with CGA, sit<->stand with CGA/min A and RW and VC for hand placement, and amb 15 feet. Pt on 3 L O2 with SATS 88-93% with VC to breathe in thru nose. Pt required frequent rest breaks with mob d/t SOA with O2 SATS briefly dropping to 87% during amb rebounded to 88% with PLB.  Con't with PT POC and progress as tolerated

## 2022-06-12 NOTE — PLAN OF CARE
Goal Outcome Evaluation:  Plan of Care Reviewed With: patient-Patient continues to remain on 3liters nasal cannula, receiving neb treatments. Working with PT, Awaiting placement at discharge- CM following. No complaints voiced at this time. Encouraged incentive spirometry use, and strengthening exercises.        Progress: improving

## 2022-06-12 NOTE — PROGRESS NOTES
[facility]    Name:  Gene Mobley   Age:  89 y.o.  Sex:  male  :  3/14/1933  MRN:  [id number]   Visit Number:  [id number]  Admission Date:  2022  Date Of Service:  22  Primary Care Physician:  Provider, No Known     LOS: 18 days :    Chief Complaint:      Generalized weakness.    Subjective:    Mr. Mobley was seen and examined this morning.  He is currently sitting up on the bed and eating his breakfast.  He is currently on 3 L of nasal cannula oxygen and saturating in the mid 90s.  He did walk around the bed with physical therapy yesterday and sat on the chair for a couple of hours.  Denies any new complaints.    Hospital Course:    Mr. Mobley is an 89-year-old male with history of hypertension, diabetes mellitus type 2 was admitted from the emergency room with cough, shortness of breath for 2 weeks.  Patient apparently was diagnosed with COVID-19 approximately 10 days prior to admission by his primary care provider and was treated with antibiotics therapy and steroids.  In the emergency room, his initial temperature was 99.1, pulse 69, blood pressure 171/69 and pulse oxygen saturation of 92% on room air.  His pulse oxygen saturation subsequently decreased into the low 80s and he was placed on 5 L of nasal cannula oxygen.  Blood work done in the emergency room revealed a creatinine of 1.49 (baseline) and hemoglobin of 12.  Patient received IV fluids and was treated symptomatically in the emergency room.  He was not continued on antibiotics therapy and was not placed on steroids.  His home medications for blood pressure were restarted.  Patient's FiO2 requirements did bump up and he had to be placed on 15 L nasal cannula oxygen the next day.  He subsequently slowly improved with a decrease in his FiO2 requirements.  He was noted to have elevated blood sugars and his Levemir dose was adjusted accordingly.    Unfortunately, patient's oxygen requirements  continued to worsen through the hospital stay and he was started on IV dexamethasone.  His procalcitonin remain negative and he was not initiated on any antibiotics.  Due to steroid therapy, his blood sugars did worsen and his Levemir dose was uptitrated accordingly.  He was seen by Dr. Ace from pulmonology.  Patient improved slowly over the course of the next 1 week and his oxygen was down titrated to 8 L.  He was continued on physical and occupational therapy.  Patient had an assisted fall on 6/9/2022 without any obvious trauma.  His CT of the head was done which did not show any acute abnormalities.    Review of Systems:     All systems were reviewed and negative except as mentioned in subjective, assessment and plan.    Vital Signs:    Temp:  [96.7 °F (35.9 °C)-97.6 °F (36.4 °C)] 97.1 °F (36.2 °C)  Heart Rate:  [62-70] 70  Resp:  [18-20] 18  BP: (140-148)/(56-77) 143/66    Intake and output:    I/O last 3 completed shifts:  In: 1200 [P.O.:1200]  Out: 2150 [Urine:2150]  No intake/output data recorded.    Physical Examination:    General Appearance:  Alert and cooperative.   Head:  Atraumatic and normocephalic.   Eyes: Conjunctivae and sclerae normal, no icterus. No pallor.   Throat: No oral lesions, no thrush, oral mucosa moist.   Neck: Supple, trachea midline, no thyromegaly.   Lungs:   Breath sounds heard bilaterally equally.  No wheezing or crackles.  No Pleural rub or bronchial breathing.   Heart:  Normal S1 and S2, no murmur, no gallop, no rub. No JVD.   Abdomen:   Normal bowel sounds, no masses, no organomegaly. Soft, nontender, nondistended, no rebound tenderness.   Extremities: Supple, 1+ pitting ankle edema, no cyanosis, no clubbing.   Skin: No bleeding or rash.   Neurologic: Alert and oriented x 3. No facial asymmetry. Moves all four limbs but does have generalized weakness. No tremors.      Laboratory results:    Results from last 7 days   Lab Units 06/12/22  0540 06/10/22  0744 06/09/22  0644    SODIUM mmol/L 136 136 136   POTASSIUM mmol/L 4.3 4.6 4.4   CHLORIDE mmol/L 104 103 101   CO2 mmol/L 23.3 23.0 22.4   BUN mg/dL 39* 59* 79*   CREATININE mg/dL 1.03 1.25 1.37*   CALCIUM mg/dL 8.7 8.4* 9.2   BILIRUBIN mg/dL  --   --  0.4   ALK PHOS U/L  --   --  71   ALT (SGPT) U/L  --   --  29   AST (SGOT) U/L  --   --  13   GLUCOSE mg/dL 69 143* 190*     Results from last 7 days   Lab Units 06/12/22  0540 06/09/22  0644 06/08/22  0637   WBC 10*3/mm3 11.13* 13.12* 16.63*   HEMOGLOBIN g/dL 10.8* 12.5* 12.1*   HEMATOCRIT % 30.8* 36.7* 35.2*   PLATELETS 10*3/mm3 167 285 326     I have reviewed the patient's laboratory results.    Radiology results:    No radiology results from the last 24 hrs     Medication Review:     I have reviewed the patient's active and prn medications.     Problem List:      Acute respiratory failure with hypoxia (HCC)    GOMEZ (acute kidney injury) (HCC)    Essential hypertension    Assessment:    1. Acute hypoxic respiratory failure, POA.  2. Bronchitis secondary to COVID-19 infection, POA, improving.  3. Chronic kidney disease stage II.  4. Cholelithiasis noted on CT scan.  5. Essential hypertension.  6. Diabetes mellitus type 2.  7. Impaired mobility and ADLs.    Plan:    Respiratory failure/COVID-19.  -  Continue high flow oxygen at 3 L.  -  Continue physical and occupational therapy.  -  2D echocardiogram done during this hospitalization showed normal left ventricular ejection fraction at 65% with grade 1 diastolic dysfunction.  -  He has completed a 10-day course of dexamethasone.  -  Continue multivitamins and vitamin C.    Chronic kidney disease/hypertension.  - Renal function is at baseline.  - Continue home medications including carvedilol, losartan and amlodipine.  - Carvedilol has been increased to 12.5 mg twice daily.    Diabetes mellitus type 2.  - Blood sugar control has improved with discontinuation of steroids.  In fact, he had low blood sugar this morning.  - I will decrease the  Levemir from 20 units twice daily to 20 units at night only.  - Continue subcutaneous insulin protocol for coverage.  - Hemoglobin A1c 8.4 on 5/26/2022.    Continue continued on physical and occupational therapy.  Discussed with nursing staff.    I tried to call the patient's wife Melissa over the phone again today but unfortunately there was no response.    DVT Prophylaxis: Heparin  Code Status: DNR/DNI  Diet: Cardiac diabetic  Discharge Plan: Pending- short-term rehab placement when bed becomes available.    Abdoulaye Alcantara MD  06/12/22  11:37 EDT    Dictated utilizing Dragon dictation.

## 2022-06-12 NOTE — THERAPY TREATMENT NOTE
Patient Name: Gene Mobley  : 3/14/1933    MRN: [id number]                              Today's Date: 2022       Admit Date: 2022    Visit Dx:     ICD-10-CM ICD-9-CM   1. Pneumonia due to COVID-19 virus  U07.1 480.8    J12.82 079.89   2. Hypoxia  R09.02 799.02   3. GOMEZ (acute kidney injury) (HCC)  N17.9 584.9     Patient Active Problem List   Diagnosis   • GOMEZ (acute kidney injury) (HCC)   • Acute respiratory failure with hypoxia (HCC)   • Essential hypertension     Past Medical History:   Diagnosis Date   • Diabetes mellitus (HCC)    • Hypertension    • Impaired functional mobility, balance, gait, and endurance      Past Surgical History:   Procedure Laterality Date   • HERNIA REPAIR        General Information     Row Name 22 1601          Physical Therapy Time and Intention    Document Type therapy note (daily note)  -     Mode of Treatment physical therapy  -CC     Row Name 22 1601          General Information    Patient Profile Reviewed yes  -CC     Existing Precautions/Restrictions fall;oxygen therapy device and L/min  -CC     Row Name 22 1601          Cognition    Orientation Status (Cognition) oriented x 4  -CC     Row Name 22 1601          Safety Issues, Functional Mobility    Safety Issues Affecting Function (Mobility) positioning of assistive device;safety precautions follow-through/compliance  -     Impairments Affecting Function (Mobility) balance;endurance/activity tolerance;shortness of breath;strength  -CC           User Key  (r) = Recorded By, (t) = Taken By, (c) = Cosigned By    Initials Name Provider Type    CC Charity Daily, PTA Physical Therapist Assistant               Mobility     Row Name 22 1601          Bed Mobility    Bed Mobility rolling right  -CC     Rolling Right [place] (Bed Mobility) standby assist  -CC     Supine-Sit [place] (Bed Mobility) contact guard  -     Assistive Device (Bed Mobility) head of bed elevated  -CC      Row Name 06/12/22 1601          Transfers    Comment, (Transfers) VC for hand placement for transfers  -CC     Row Name 06/12/22 1601          Sit-Stand Transfer    Sit-Stand [place] (Transfers) contact guard;minimum assist (75% patient effort);verbal cues  -     Assistive Device (Sit-Stand Transfers) walker, front-wheeled  -CC     Row Name 06/12/22 1601          Gait/Stairs (Locomotion)    [place] (Gait) contact guard  -     Assistive Device (Gait) walker, front-wheeled  -CC     Distance in Feet (Gait) 15  -CC     Deviations/Abnormal Patterns (Gait) alba decreased;gait speed decreased  -CC           User Key  (r) = Recorded By, (t) = Taken By, (c) = Cosigned By    Initials Name Provider Type    Charity Smith, PTA Physical Therapist Assistant               Obj/Interventions    No documentation.                Goals/Plan    No documentation.                Clinical Impression     Row Name 06/12/22 1601          Pain    Pretreatment Pain Rating 0/10 - no pain  -CC     Posttreatment Pain Rating 0/10 - no pain  -CC     Additional Documentation Pain Scale: Numbers Pre/Post-Treatment (Group)  -CC     Row Name 06/12/22 1601          Plan of Care Review    Plan of Care Reviewed With patient  -CC     Progress improving  -CC     Outcome Evaluation Performed supine to R sidelying to sit with CGA, sit<->stand with CGA/min A and RW and VC for hand placement, and amb 15 feet. Pt on 3 L O2 with SATS 88-93% with VC to breathe in thru nose. Pt required frequent rest breaks with mob d/t SOA with O2 SATS briefly dropping to 87% during amb rebounded to 88% with PLB.  Con't with PT POC and progress as tolerated  -CC     Row Name 06/12/22 1601          Positioning and Restraints    Pre-Treatment Position in bed  -CC     Post Treatment Position chair  -CC     In Chair reclined;call light within reach;encouraged to call for assist  -CC           User Key  (r) = Recorded By, (t) = Taken By, (c) = Cosigned By     Initials Name Provider Type     Charity Daily PTA Physical Therapist Assistant               Outcome Measures     Row Name 06/12/22 1601          How much help from another person do you currently need...    Turning from your back to your side while in flat bed without using bedrails? 4  -CC     Moving from lying on back to sitting on the side of a flat bed without bedrails? 3  -CC     Moving to and from a bed to a chair (including a wheelchair)? 3  -CC     Standing up from a chair using your arms (e.g., wheelchair, bedside chair)? 3  -CC     Climbing 3-5 steps with a railing? 2  -CC     To walk in hospital room? 3  -CC     AM-PAC 6 Clicks Score (PT) 18  -CC     Highest level of mobility 6 --> Walked 10 steps or more  -     Row Name 06/12/22 1601          Functional Assessment    Outcome Measure Options AM-PAC 6 Clicks Basic Mobility (PT)  -           User Key  (r) = Recorded By, (t) = Taken By, (c) = Cosigned By    Initials Name Provider Type     Charity Daily PTA Physical Therapist Assistant                             Physical Therapy Education                 Title: PT OT SLP Therapies (In Progress)     Topic: Physical Therapy (In Progress)     Point: Mobility training (Done)     Learning Progress Summary           Patient Acceptance, E,TB, VU by CC at 6/11/2022 1516    Comment: Importance of increasing mobility      Show all documentation for this point (7)                 Point: Home exercise program (Not Started)     Learner Progress:  Not documented in this visit.          Point: Body mechanics (Done)     Learning Progress Summary           Patient Acceptance, E,TB, VU by EC at 6/10/2022 1932                   Point: Precautions (Done)     Learning Progress Summary           Patient Acceptance, E,TB, VU by CC at 5/30/2022 1436    Comment: importance of not over excerting and maintain O2 levels >90%                               User Key     Initials Effective Dates Name Provider Type  Discipline    CC 06/16/21 -  Charity Daily PTA Physical Therapist Assistant PT    EC 05/31/22 -  Marybel Ramirez, RN Registered Nurse Nurse              PT Recommendation and Plan     Plan of Care Reviewed With: patient  Progress: improving  Outcome Evaluation: Performed supine to R sidelying to sit with CGA, sit<->stand with CGA/min A and RW and VC for hand placement, and amb 15 feet. Pt on 3 L O2 with SATS 88-93% with VC to breathe in thru nose. Pt required frequent rest breaks with mob d/t SOA with O2 SATS briefly dropping to 87% during amb rebounded to 88% with PLB.  Con't with PT POC and progress as tolerated     Time Calculation:    PT Charges     Row Name 06/12/22 1726             Time Calculation    Start Time 1601  -CC      Stop Time 1635  -CC      Time Calculation (min) 34 min  -CC      PT Received On 06/12/22  -CC      PT Goal Re-Cert Due Date 06/17/22  -CC              Timed Charges    61570 - Gait Training Minutes  15  -CC      21311 - PT Therapeutic Activity Minutes 19  -CC              Total Minutes    Timed Charges Total Minutes 34  -CC       Total Minutes 34  -CC            User Key  (r) = Recorded By, (t) = Taken By, (c) = Cosigned By    Initials Name Provider Type    CC Charity Daily PTA Physical Therapist Assistant              Therapy Charges for Today     Code Description Service Date Service Provider Modifiers Qty    98317896182 HC PT THER PROC EA 15 MIN 6/11/2022 Charity Daily, PTA GP 1    37774007436 HC GAIT TRAINING EA 15 MIN 6/11/2022 Charity Daily, PTA GP 1    81745377493 HC PT THERAPEUTIC ACT EA 15 MIN 6/11/2022 Charity Daily, PTA GP 1    74018742248 HC GAIT TRAINING EA 15 MIN 6/12/2022 Charity Daily, PTA GP 1    15225562704 HC PT THERAPEUTIC ACT EA 15 MIN 6/12/2022 Charity Daily, HAYLIE GP 1          PT G-Codes  Outcome Measure Options: AM-PAC 6 Clicks Basic Mobility (PT)  AM-PAC 6 Clicks Score (PT): 18  AM-PAC 6 Clicks Score (OT): 16    Charity Daily  PTA  6/12/2022

## 2022-06-12 NOTE — PLAN OF CARE
Goal Outcome Evaluation:  Remains on 3LNC, AM blood sugar 63 treated and came up to 135 (blood sugar is not crossing over into the system)     Problem: Adult Inpatient Plan of Care  Goal: Plan of Care Review  Outcome: Ongoing, Progressing

## 2022-06-13 NOTE — CASE MANAGEMENT/SOCIAL WORK
Continued Stay Note   Jo     Patient Name: Gene Mobley  MRN: [id number]  Today's Date: 6/13/2022    Admit Date: 5/25/2022     Discharge Plan     Row Name 06/13/22 1328       [facility]    [facility] Spoke to pt he is agreement to this [facility]  [facility]     [facility] and will start precert 6/13    Pt is accepting of this called wife no answer no voice message set up                Discharge Codes    No documentation.               Expected Discharge Date and Time     Expected Discharge Date Expected Discharge Time    Jun 14, 2022             Veronique Horn RN

## 2022-06-13 NOTE — THERAPY TREATMENT NOTE
Pt declined working with therapy stating his stomach was not feeling well.  Pt reports needing to have a BM, but isn't having any luck.  Will follow up with pt tomorrow.

## 2022-06-13 NOTE — PLAN OF CARE
Goal Outcome Evaluation:  Plan of Care Reviewed With: patient        Progress: no change   Gene remains on 2L-3L NC and maintains SpO2 above 90%.Removed from isolation. Glucose managed per MAR.

## 2022-06-13 NOTE — PROGRESS NOTES
[facility]    Name:  Gene Mobley   Age:  89 y.o.  Sex:  male  :  3/14/1933  MRN:  [id number]   Visit Number:  [id number]  Admission Date:  2022  Date Of Service:  22  Primary Care Physician:  Provider, No Known     LOS: 19 days :    Chief Complaint:      Generalized weakness.    Subjective:    Mr. Mobley was seen and examined this morning.  He is currently lying down on the bed and is comfortable at rest.  He states that he walked around the bed yesterday with physical therapy and sat on the chair for couple of hours.  As per physical therapy note he walked 15 feet and was on 3 L of nasal cannula oxygen.  Denies any chest pain and shortness of breath has improved.  He also feels that his generalized weakness is improving as well.    Hospital Course:    Mr. Mobley is an 89-year-old male with history of hypertension, diabetes mellitus type 2 was admitted from the emergency room with cough, shortness of breath for 2 weeks.  Patient apparently was diagnosed with COVID-19 approximately 10 days prior to admission by his primary care provider and was treated with antibiotics therapy and steroids.  In the emergency room, his initial temperature was 99.1, pulse 69, blood pressure 171/69 and pulse oxygen saturation of 92% on room air.  His pulse oxygen saturation subsequently decreased into the low 80s and he was placed on 5 L of nasal cannula oxygen.  Blood work done in the emergency room revealed a creatinine of 1.49 (baseline) and hemoglobin of 12.  Patient received IV fluids and was treated symptomatically in the emergency room.  He was not continued on antibiotics therapy and was not placed on steroids.  His home medications for blood pressure were restarted.  Patient's FiO2 requirements did bump up and he had to be placed on 15 L nasal cannula oxygen the next day.  He subsequently slowly improved with a decrease in his FiO2 requirements.  He was noted to have  elevated blood sugars and his Levemir dose was adjusted accordingly.    Unfortunately, patient's oxygen requirements continued to worsen through the hospital stay and he was started on IV dexamethasone.  His procalcitonin remain negative and he was not initiated on any antibiotics.  Due to steroid therapy, his blood sugars did worsen and his Levemir dose was uptitrated accordingly.  He was seen by Dr. Ace from pulmonology.  Patient improved slowly over the course of the next 1 week and his oxygen was down titrated to 8 L.  He was continued on physical and occupational therapy.  Patient had an assisted fall on 6/9/2022 without any obvious trauma.  His CT of the head was done which did not show any acute abnormalities.  Patient was continued on physical therapy.  Case management was consulted for short-term rehab placement.    Review of Systems:     All systems were reviewed and negative except as mentioned in subjective, assessment and plan.    Vital Signs:    Temp:  [97.2 °F (36.2 °C)-98.9 °F (37.2 °C)] 98.9 °F (37.2 °C)  Heart Rate:  [61-69] 66  Resp:  [18-19] 19  BP: (121-140)/(53-89) 123/53    Intake and output:    I/O last 3 completed shifts:  In: 1680 [P.O.:1680]  Out: 1550 [Urine:1550]  I/O this shift:  In: 480 [P.O.:480]  Out: 250 [Urine:250]    Physical Examination:    General Appearance:  Alert and cooperative.   Head:  Atraumatic and normocephalic.   Eyes: Conjunctivae and sclerae normal, no icterus. No pallor.   Throat: No oral lesions, no thrush, oral mucosa moist.   Neck: Supple, trachea midline, no thyromegaly.   Lungs:   Breath sounds heard bilaterally equally.  No wheezing or crackles.  No Pleural rub or bronchial breathing.   Heart:  Normal S1 and S2, no murmur, no gallop, no rub. No JVD.   Abdomen:   Normal bowel sounds, no masses, no organomegaly. Soft, nontender, nondistended, no rebound tenderness.   Extremities: Supple, 1+ pitting ankle edema, no cyanosis, no clubbing.   Skin: No bleeding or  rash.   Neurologic: Alert and oriented x 3. No facial asymmetry. Moves all four limbs but does have generalized weakness. No tremors.      Laboratory results:    Results from last 7 days   Lab Units 06/12/22  0540 06/10/22  0744 06/09/22  0644   SODIUM mmol/L 136 136 136   POTASSIUM mmol/L 4.3 4.6 4.4   CHLORIDE mmol/L 104 103 101   CO2 mmol/L 23.3 23.0 22.4   BUN mg/dL 39* 59* 79*   CREATININE mg/dL 1.03 1.25 1.37*   CALCIUM mg/dL 8.7 8.4* 9.2   BILIRUBIN mg/dL  --   --  0.4   ALK PHOS U/L  --   --  71   ALT (SGPT) U/L  --   --  29   AST (SGOT) U/L  --   --  13   GLUCOSE mg/dL 69 143* 190*     Results from last 7 days   Lab Units 06/12/22  0540 06/09/22  0644 06/08/22  0637   WBC 10*3/mm3 11.13* 13.12* 16.63*   HEMOGLOBIN g/dL 10.8* 12.5* 12.1*   HEMATOCRIT % 30.8* 36.7* 35.2*   PLATELETS 10*3/mm3 167 285 326     I have reviewed the patient's laboratory results.    Radiology results:    No radiology results from the last 24 hrs     Medication Review:     I have reviewed the patient's active and prn medications.     Problem List:      Acute respiratory failure with hypoxia (HCC)    GOMEZ (acute kidney injury) (HCC)    Essential hypertension    Assessment:    1. Acute hypoxic respiratory failure, POA.  2. Bronchitis secondary to COVID-19 infection, POA, improving.  3. Chronic kidney disease stage II.  4. Cholelithiasis noted on CT scan.  5. Essential hypertension.  6. Diabetes mellitus type 2.  7. Impaired mobility and ADLs.    Plan:    Respiratory failure/COVID-19.  -  Continue high flow oxygen at 3 L.  -  Continue physical and occupational therapy.  -  2D echocardiogram done during this hospitalization showed normal left ventricular ejection fraction at 65% with grade 1 diastolic dysfunction.  -  He has completed a 10-day course of dexamethasone.  -  Continue multivitamins and vitamin C.    Chronic kidney disease/hypertension.  - Renal function is at baseline.  - Continue home medications including carvedilol,  losartan and amlodipine.  - Carvedilol has been increased to 12.5 mg twice daily.    Diabetes mellitus type 2.  - Patient had steroid-induced hyperglycemia but once the steroids were discontinued, he had low blood sugars with appropriate down titration of his insulin therapy.  - Continue Levemir 20 units at night.  - Continue subcutaneous insulin protocol for coverage.  - Hemoglobin A1c 8.4 on 5/26/2022.    Continue continued on physical and occupational therapy.      Discussed with nursing staff and multidisciplinary team.    I discussed the patient's condition and treatment plan with his wife Melissa over the phone today.  She is agreeable for him to go either to [place] or [facility] for rehab.    DVT Prophylaxis: Heparin  Code Status: DNR/DNI  Diet: Cardiac diabetic  Discharge Plan: Pending- short-term rehab placement when bed becomes available.    Abdoulaye Alcantara MD  06/13/22  12:43 EDT    Dictated utilizing Dragon dictation.

## 2022-06-13 NOTE — THERAPY TREATMENT NOTE
Pt declined PT today stating I need to have a BM requested to hold until tomorrow. PT to f/u with pt tomorrow

## 2022-06-13 NOTE — PLAN OF CARE
Goal Outcome Evaluation:  Patient remains on 3LNC.   Problem: Adult Inpatient Plan of Care  Goal: Plan of Care Review  Outcome: Ongoing, Progressing

## 2022-06-14 NOTE — DISCHARGE INSTRUCTIONS
-Follow-up with your PCP in 2 to 3 days for recheck.  -Follow-up with Dr. Ace in the office per directions.  - Please come back to the ER if any worse or any new signs or symptoms.

## 2022-06-14 NOTE — PLAN OF CARE
Goal Outcome Evaluation:  Plan of Care Reviewed With: patient        Progress: no change  Outcome Evaluation: Pt recieved supine in bed and willing to participate with treatment.  Pt performed transfer to EOB with min a. Pt was min a with vc's for hand placement for sts transfer.  Pt requires vc's for safety, posture, hand placement for transfers, positioning of rw and pursed lip breathing  while ambulating 12' min a.  Pt O2 did decline to 85% and required 3-4 minutes to rebound to 90%. Pt returned to 85% in approx 5-6 minutes.  See flowsheet for details

## 2022-06-14 NOTE — PLAN OF CARE
Goal Outcome Evaluation:  No complaints overnight from patient.   Problem: Adult Inpatient Plan of Care  Goal: Plan of Care Review  Outcome: Ongoing, Progressing

## 2022-06-14 NOTE — CASE MANAGEMENT/SOCIAL WORK
Continued Stay Note  MIA Jo     Patient Name: Gene Mobley  MRN: [id number]  Today's Date: 6/14/2022    Admit Date: 5/25/2022     Discharge Plan     Row Name 06/14/22 1327       Plan    Plan [facility] can accept pt today does not a COVID test Dc summary fax to [phone] call dc summary to  [phone]  Spoke to pt and wife regarding this they are accepting of this                Discharge Codes    No documentation.               Expected Discharge Date and Time     Expected Discharge Date Expected Discharge Time    Aly 15, 2022             Veronique Horn RN

## 2022-06-14 NOTE — CASE MANAGEMENT/SOCIAL WORK
Case Management Discharge Note                Selected Continued Care - Admitted Since 5/25/2022     Destination Coordination complete.    Service Provider Selected Services Address Phone Fax Patient Preferred    [facility]  Skilled Nursing [address] [place] [address] [phone] --       Internal Comment last updated by Veronique Horn, RN 6/13/2022 1001    They will look at referral                        Durable Medical Equipment    No services have been selected for the patient.              Dialysis/Infusion    No services have been selected for the patient.              Home Medical Care    No services have been selected for the patient.              Therapy    No services have been selected for the patient.              Community Resources    No services have been selected for the patient.              Community & DME    No services have been selected for the patient.                  Transportation Services  Ambulance: [facility]

## 2022-06-14 NOTE — THERAPY TREATMENT NOTE
Patient Name: Gene Mobley  : 3/14/1933    MRN: [id number]                              Today's Date: 2022       Admit Date: 2022    Visit Dx:     ICD-10-CM ICD-9-CM   1. Pneumonia due to COVID-19 virus  U07.1 480.8    J12.82 079.89   2. Hypoxia  R09.02 799.02   3. GOMEZ (acute kidney injury) (HCC)  N17.9 584.9     Patient Active Problem List   Diagnosis   • GOMEZ (acute kidney injury) (HCC)   • Acute respiratory failure with hypoxia (HCC)   • Essential hypertension     Past Medical History:   Diagnosis Date   • Diabetes mellitus (HCC)    • Hypertension    • Impaired functional mobility, balance, gait, and endurance      Past Surgical History:   Procedure Laterality Date   • HERNIA REPAIR        General Information     Row Name 22 135          Physical Therapy Time and Intention    Document Type therapy note (daily note)  -RM     Mode of Treatment physical therapy  -     Row Name 22 135          General Information    Patient Profile Reviewed yes  -RM     Existing Precautions/Restrictions fall;oxygen therapy device and L/min  -RM     Row Name 22 135          Cognition    Orientation Status (Cognition) oriented x 4  -RM     Row Name 22 135          Safety Issues, Functional Mobility    Safety Issues Affecting Function (Mobility) safety precautions follow-through/compliance;positioning of assistive device;safety precaution awareness;sequencing abilities;insight into deficits/self-awareness  -     Impairments Affecting Function (Mobility) balance;endurance/activity tolerance;shortness of breath;strength  -RM           User Key  (r) = Recorded By, (t) = Taken By, (c) = Cosigned By    Initials Name Provider Type     Mekhi Caceres, PTA Physical Therapist Assistant               Mobility     Row Name 22 135          Bed Mobility    Supine-Sit [place] (Bed Mobility) minimum assist (75% patient effort);contact guard;verbal cues  -     Row Name 22 9427           Sit-Stand Transfer    Sit-Stand [place] (Transfers) minimum assist (75% patient effort);verbal cues  -RM     Assistive Device (Sit-Stand Transfers) walker, front-wheeled  -RM     Row Name 06/14/22 1351          Gait/Stairs (Locomotion)    [place] Level (Gait) minimum assist (75% patient effort);verbal cues  -RM     Assistive Device (Gait) walker, front-wheeled  -RM     Distance in Feet (Gait) 12'  -RM     Deviations/Abnormal Patterns (Gait) alba decreased;gait speed decreased;stride length decreased;weight shifting decreased  -RM     Bilateral Gait Deviations forward flexed posture  -RM           User Key  (r) = Recorded By, (t) = Taken By, (c) = Cosigned By    Initials Name Provider Type    RM Mekhi Caceres, PTA Physical Therapist Assistant               Obj/Interventions    No documentation.                Goals/Plan    No documentation.                Clinical Impression     Row Name 06/14/22 1352          Pain    Pretreatment Pain Rating 0/10 - no pain  -RM     Posttreatment Pain Rating 0/10 - no pain  -RM     Row Name 06/14/22 1352          Plan of Care Review    Plan of Care Reviewed With patient  -RM     Progress no change  -RM     Outcome Evaluation Pt recieved supine in bed and willing to participate with treatment.  Pt performed transfer to EOB with min a. Pt was min a with vc's for hand placement for sts transfer.  Pt requires vc's for safety, posture, hand placement for transfers, positioning of rw and pursed lip breathing  while ambulating 12' min a.  Pt O2 did decline to 85% and required 3-4 minutes to rebound to 90%. Pt returned to 85% in approx 5-6 minutes.  See flowsheet for details  -     Row Name 06/14/22 1352          Vital Signs    Pre SpO2 (%) 95  -RM     O2 Delivery Pre Treatment supplemental O2  3  -RM     Intra SpO2 (%) 85  -RM     O2 Delivery Intra Treatment supplemental O2  3  -RM     Post SpO2 (%) 95  -RM     O2 Delivery Post Treatment supplemental O2  3  -RM     Pre  Patient Position Supine  -RM     Intra Patient Position Standing  -RM     Post Patient Position Sitting  -RM     Row Name 06/14/22 1352          Positioning and Restraints    Pre-Treatment Position in bed  -RM     Post Treatment Position chair  -RM     In Chair reclined;call light within reach;encouraged to call for assist;notified nsg  -RM           User Key  (r) = Recorded By, (t) = Taken By, (c) = Cosigned By    Initials Name Provider Type    Mekhi Garza, HAYLIE Physical Therapist Assistant               Outcome Measures     Row Name 06/14/22 1359          How much help from another person do you currently need...    Turning from your back to your side while in flat bed without using bedrails? 3  -RM     Moving from lying on back to sitting on the side of a flat bed without bedrails? 3  -RM     Moving to and from a bed to a chair (including a wheelchair)? 3  -RM     Standing up from a chair using your arms (e.g., wheelchair, bedside chair)? 3  -RM     Climbing 3-5 steps with a railing? 2  -RM     To walk in hospital room? 3  -RM     AM-PAC 6 Clicks Score (PT) 17  -RM     Highest level of mobility 5 --> Static standing  -RM     Row Name 06/14/22 1359          Functional Assessment    Outcome Measure Options AM-PAC 6 Clicks Basic Mobility (PT)  -RM           User Key  (r) = Recorded By, (t) = Taken By, (c) = Cosigned By    Initials Name Provider Type    Mekhi Garza, HAYLIE Physical Therapist Assistant                             Physical Therapy Education                 Title: PT OT SLP Therapies (In Progress)     Topic: Physical Therapy (In Progress)     Point: Mobility training (Done)     Learning Progress Summary           Patient Acceptance, E,TB,D, VU,NR by  at 6/14/2022 9092    Comment: safety with mobility and proper techniques and pusrd lip breathing      Show all documentation for this point (8)                 Point: Home exercise program (Not Started)     Learner Progress:  Not documented  in this visit.          Point: Body mechanics (Done)     Learning Progress Summary           Patient Acceptance, E,TB, VU by EC at 6/10/2022 1932                   Point: Precautions (Done)     Learning Progress Summary           Patient Acceptance, E,TB, VU by  at 5/30/2022 1436    Comment: importance of not over excerting and maintain O2 levels >90%                               User Key     Initials Effective Dates Name Provider Type Discipline    CC 06/16/21 -  Charity Daily, PTA Physical Therapist Assistant PT     06/16/21 -  Mekhi Caceres, HAYLIE Physical Therapist Assistant PT     05/31/22 -  Marybel Ramirez, KELSIE Registered Nurse Nurse              PT Recommendation and Plan     Plan of Care Reviewed With: patient  Progress: no change  Outcome Evaluation: Pt recieved supine in bed and willing to participate with treatment.  Pt performed transfer to EOB with min a. Pt was min a with vc's for hand placement for sts transfer.  Pt requires vc's for safety, posture, hand placement for transfers, positioning of rw and pursed lip breathing  while ambulating 12' min a.  Pt O2 did decline to 85% and required 3-4 minutes to rebound to 90%. Pt returned to 85% in approx 5-6 minutes.  See flowsheet for details     Time Calculation:    PT Charges     Row Name 06/14/22 1400             Time Calculation    Start Time 1054  -RM      Stop Time 1115  -RM      Time Calculation (min) 21 min  -RM      PT Received On 06/14/22  -RM      PT Goal Re-Cert Due Date 06/17/22  -RM              Time Calculation- PT    Total Timed Code Minutes- PT 21 minute(s)  -RM              Timed Charges    74629 - Gait Training Minutes  15  -RM      47442 - PT Therapeutic Activity Minutes 6  -RM              Total Minutes    Timed Charges Total Minutes 21  -RM       Total Minutes 21  -RM            User Key  (r) = Recorded By, (t) = Taken By, (c) = Cosigned By    Initials Name Provider Type     Mekhi Caceres, PTA Physical Therapist  Assistant              Therapy Charges for Today     Code Description Service Date Service Provider Modifiers Qty    [id number] HC GAIT TRAINING EA 15 MIN 6/14/2022 Mekhi Caceres, PTA GP 1          PT G-Codes  Outcome Measure Options: AM-PAC 6 Clicks Basic Mobility (PT)  AM-PAC 6 Clicks Score (PT): 17  AM-PAC 6 Clicks Score (OT): 16    Mekhi Caceres PTA  6/14/2022

## 2022-06-14 NOTE — DISCHARGE SUMMARY
[facility]   DISCHARGE SUMMARY      Name:  Gene Mobley   Age:  89 y.o.  Sex:  male  :  3/14/1933  MRN:  [id number]   Visit Number:  [id number]    Admission Date:  2022  Date of Discharge:  2022  Primary Care Physician:  Provider, No Known    Important issues to note:    -Patient was admitted to the hospital with acute respiratory failure secondary to COVID-pneumonia.  Patient was requiring high flow oxygen with 15 L initially which was titrated down to 3 L nasal cannula on discharge.  Pulmonology evaluated the patient, patient was not a candidate for Actemra.  Patient received dexamethasone for 10 days.  -His carvedilol was increased to 12.5 mg twice daily.   -Continue patient on albuterol inhaler as needed.  -Continue oxygen supplements to keep saturation above 90% , continue to titrate down if possible.  -Patient will be discharged to rehab at [facility] to continue on his therapy.  -Patient to follow-up with Dr. Ace in the office as an outpatient in addition to following up with his PCP in 2 to 3 days.    Discharge Diagnoses:     1. Acute hypoxic respiratory failure, POA.  2. Bronchitis secondary to COVID-19 infection, POA, improving.  3. Chronic kidney disease stage II.  4. Cholelithiasis noted on CT scan.  5. Essential hypertension.  6. Diabetes mellitus type 2.  7. Impaired mobility and ADLs.      Problem List:     Active Hospital Problems    Diagnosis  POA   • **Acute respiratory failure with hypoxia (HCC) [J96.01]  Yes   • GOMEZ (acute kidney injury) (HCC) [N17.9]  Yes   • Essential hypertension [I10]  Yes      Resolved Hospital Problems    Diagnosis Date Resolved POA   • Cytokine release syndrome, grade 2 [D89.832] 06/10/2022 No   • Pneumonia due to COVID-19 virus [U07.1, J12.82] 06/10/2022 Yes     Presenting Problem:    Chief Complaint   Patient presents with   • Pneumonia      Consults:     Consulting Physician(s)  Chat With All Active Members    Provider  Relationship Specialty    Werner Ace MD  Consulting Physician Pulmonary Disease        Procedures Performed:        History of presenting illness/Hospital Course:    Mr. Mobley is an 89-year-old male with history of hypertension, diabetes mellitus type 2 was admitted from the emergency room with cough, shortness of breath for 2 weeks.  Patient apparently was diagnosed with COVID-19 approximately 10 days prior to admission by his primary care provider and was treated with antibiotics therapy and steroids.  In the emergency room, his initial temperature was 99.1, pulse 69, blood pressure 171/69 and pulse oxygen saturation of 92% on room air.  His pulse oxygen saturation subsequently decreased into the low 80s and he was placed on 5 L of nasal cannula oxygen.  Blood work done in the emergency room revealed a creatinine of 1.49 (baseline) and hemoglobin of 12.  Patient received IV fluids and was treated symptomatically in the emergency room.  He was not continued on antibiotics therapy and was not placed on steroids.  His home medications for blood pressure were restarted.  Patient's FiO2 requirements did bump up and he had to be placed on 15 L nasal cannula oxygen the next day.  He subsequently slowly improved with a decrease in his FiO2 requirements.  He was noted to have elevated blood sugars and his Levemir dose was adjusted accordingly.     Unfortunately, patient's oxygen requirements continued to worsen through the hospital stay and he was started on IV dexamethasone.  His procalcitonin remain negative and he was not initiated on any antibiotics.  Due to steroid therapy, his blood sugars did worsen and his Levemir dose was uptitrated accordingly.  He was seen by Dr. Ace from pulmonology.  Patient improved slowly over the course of the next 1 week and his oxygen was down titrated to 8 L.  He was continued on physical and occupational therapy.  Patient had an assisted fall on 6/9/2022 without any obvious  trauma.  His CT of the head was done which did not show any acute abnormalities.  Patient was continued on physical therapy.  Case management was consulted for short-term rehab placement and patient was accepted for [facility].  Patient was seen and examined on day of discharge.  Patient sitting up in the chair and has been participating with therapy with small steps earlier today. Patient reports generalized weakness however continues to improve slowly on his respiratory status.  Patient is currently down to 3 L nasal cannula.  Patient with significant improvement on his respiratory status compared to last week.  Patient denies any acute complaints at this time.  I discussed management plan with the patient, all of his questions were answered to his satisfaction.     Vital Signs:    Temp:  [97.5 °F (36.4 °C)-99.8 °F (37.7 °C)] 98.3 °F (36.8 °C)  Heart Rate:  [60-87] 71  Resp:  [19-24] 24  BP: (115-146)/(52-83) 115/52    Physical Exam:    General Appearance:  Alert and cooperative.  No acute distress.  On 3 L nasal cannula.   Head:  Atraumatic and normocephalic.   Eyes: Conjunctivae and sclerae normal, no icterus. No pallor.   Ears:  Ears with no abnormalities noted.   Throat: No oral lesions, no thrush, oral mucosa moist.   Neck: Supple, trachea midline, no thyromegaly.   Back:   No kyphoscoliosis present. No tenderness to palpation.   Lungs:   Breath sounds heard bilaterally equally.  No crackles or wheezing. No Pleural rub or bronchial breathing.   Heart:  Normal S1 and S2, no murmur, no gallop, no rub. No JVD.   Abdomen:   Normal bowel sounds, no masses, no organomegaly. Soft, nontender, nondistended, no rebound tenderness.   Extremities: Supple, no edema, no cyanosis, no clubbing.   Pulses: Pulses palpable bilaterally.   Skin: No bleeding or rash.   Neurologic: Alert and oriented x 3. No facial asymmetry. Moves all four limbs but does have generalized weakness. No tremors.     Pertinent Lab Results:      Results from last 7 days   Lab Units 06/12/22  0540 06/10/22  0744 06/09/22  0644   SODIUM mmol/L 136 136 136   POTASSIUM mmol/L 4.3 4.6 4.4   CHLORIDE mmol/L 104 103 101   CO2 mmol/L 23.3 23.0 22.4   BUN mg/dL 39* 59* 79*   CREATININE mg/dL 1.03 1.25 1.37*   CALCIUM mg/dL 8.7 8.4* 9.2   BILIRUBIN mg/dL  --   --  0.4   ALK PHOS U/L  --   --  71   ALT (SGPT) U/L  --   --  29   AST (SGOT) U/L  --   --  13   GLUCOSE mg/dL 69 143* 190*     Results from last 7 days   Lab Units 06/12/22  0540 06/09/22  0644 06/08/22  0637   WBC 10*3/mm3 11.13* 13.12* 16.63*   HEMOGLOBIN g/dL 10.8* 12.5* 12.1*   HEMATOCRIT % 30.8* 36.7* 35.2*   PLATELETS 10*3/mm3 167 285 326                                   Pertinent Radiology Results:    Imaging Results (All)     Procedure Component Value Units Date/Time    CT Head Without Contrast [[id number]] Collected: 06/09/22 0852     Updated: 06/09/22 0855    Narrative:      PROCEDURE: CT HEAD WO CONTRAST-     HISTORY: Fall and confusion; U07.1-COVID-19; J12.82-Pneumonia due to  coronavirus disease 2019; R09.02-Hypoxemia; N17.9-Acute kidney failure,  unspecified, altered mental status. Confusion.     TECHNIQUE: Noncontrast exam     FINDINGS:  Moderate atrophy and chronic ischemic white matter changes  are noted.     No cortical edema is present. There is no mass or hemorrhage. Ventricles  are normal.     Bone windows show no skull fracture or obvious destructive lesion.       Impression:      1. No acute intracranial abnormality or obvious mass.   2. Atrophy and chronic ischemic white matter changes as above.        This study was performed with techniques to keep radiation doses as low  as reasonably achievable (ALARA). Individualized dose reduction  techniques using automated exposure control or adjustment of vA and/or  kV according to the patient size were employed.            This report was signed and finalized on 6/9/2022 8:53 AM by Tucker Norris MD.    XR Chest 1 View [[id number]]  Collected: 06/03/22 1134     Updated: 06/03/22 1137    Narrative:      CLINICAL INDICATION:    acute resp failure, COVID PNA; U07.1-COVID-19; J12.82-Pneumonia due to  coronavirus disease 2019; R09.02-Hypoxemia; N17.9-Acute kidney failure,  unspecified     EXAMINATION TECHNIQUE:   XR CHEST 1 VW-     COMPARISON:  Radiographs 05/26/2022. CT chest dated 05/25/2022.     FINDINGS:  Interval worsening bilateral multifocal airspace opacities. Stable  enlarged cardiomediastinal silhouette. No pneumothorax. No large  effusion.       Impression:      Worsening bilateral multifocal airspace disease/pneumonia.     Images personally reviewed, interpreted and dictated by LISSY Saleh.                This report was signed and finalized on 6/3/2022 11:35 AM by LISSY Saleh.    XR Chest 1 View [[id number]] Collected: 05/26/22 1010     Updated: 05/26/22 1017    Narrative:      CLINICAL INDICATION:    COVID pneumonia; U07.1-COVID-19; J12.82-Pneumonia due to coronavirus  disease 2019; R09.02-Hypoxemia; N17.9-Acute kidney failure, unspecified     EXAMINATION TECHNIQUE:   XR CHEST 1 VW-     COMPARISON:  CT chest 05/25/2022. Chest radiograph 10/31/2013.     FINDINGS:  Right mid and lower lung and left lower lung ill-defined airspace  opacities. No pneumothorax or pleural effusion. Heart and mediastinal  contours are within normal limits.        Impression:      Bilateral multifocal pneumonia, right greater than left.     Images personally reviewed, interpreted and dictated by LISSY Saleh.                This report was signed and finalized on 5/26/2022 10:15 AM by LISSY Saleh.    CT Angiogram Chest Pulmonary Embolism [[id number]] Collected: 05/25/22 2229     Updated: 05/25/22 2331    Narrative:      FINAL REPORT    TECHNIQUE:  Thin section axial images were obtained through the chest and  during the arterial phase of IV contrast administration. Coronal  3-D MIP reconstructed images were also  provided.    CLINICAL HISTORY:  recent covid soa    FINDINGS:  The pulmonary arteries are well-opacified and there is no  filling defect to indicate pulmonary embolism. The thoracic  aorta is normal. There are multifocal bilateral patchy areas of  groundglass/airspace opacity consistent with pneumonia,  including viral pneumonia. There is no pleural disease,  adenopathy or significant osseous abnormality.  In the upper  abdomen, there are multiple dependent gallstones in an otherwise  normal gallbladder.      Impression:      Viral pneumonia. No pulmonary embolism.  Gallstones.    Authenticated by James Pedraza M.D. on 05/25/2022 10:29:36 PM          Echo:    Results for orders placed during the hospital encounter of 05/25/22    Adult Transthoracic Echo Complete W/ Cont if Necessary Per Protocol    Interpretation Summary  1.  Normal left ventricular size and systolic function, LVEF 65-70%.  2.  Mild concentric LVH.  3.  Grade 1 diastolic dysfunction.  4.  Normal right ventricular size and systolic function.  5.  Mildly increased left atrial volume index.  6.  Mild calcification of aortic valve without significant stenosis.    Condition on Discharge:      Stable.    Code status during the hospital stay:    Code Status and Medical Interventions:   Ordered at: 05/30/22 1348     Medical Intervention Limits:    NO intubation (DNI)    NO cardioversion     Level Of Support Discussed With:    Patient    Health Care Surrogate     Code Status (Patient has no pulse and is not breathing):    No CPR (Do Not Attempt to Resuscitate)     Medical Interventions (Patient has pulse or is breathing):    Limited Support     Discharge Disposition:    Rehab Facility or Unit (DC - External)    Discharge Medications:       Discharge Medications      New Medications      Instructions Start Date   albuterol sulfate  (90 Base) MCG/ACT inhaler  Commonly known as: PROVENTIL HFA;VENTOLIN HFA;PROAIR HFA   2 puffs, Inhalation, Every 4 Hours PRN       insulin detemir 100 UNIT/ML injection  Commonly known as: LEVEMIR   20 Units, Subcutaneous, Nightly         Changes to Medications      Instructions Start Date   carvedilol 6.25 MG tablet  Commonly known as: COREG  What changed: how much to take   12.5 mg, Oral, 2 Times Daily With Meals      losartan 50 MG tablet  Commonly known as: COZAAR  What changed:   · medication strength  · how much to take  · when to take this   50 mg, Oral, Every 24 Hours Scheduled   Start Date: Tonya 15, 2022        Continue These Medications      Instructions Start Date   amLODIPine 10 MG tablet  Commonly known as: NORVASC   10 mg, Oral, Daily      insulin glargine 100 UNIT/ML injection  Commonly known as: LANTUS, SEMGLEE   Subcutaneous, 2 Times Daily, Sliding scale dosing      tamsulosin 0.4 MG capsule 24 hr capsule  Commonly known as: FLOMAX   1 capsule, Oral, Daily         Stop These Medications    cefdinir 300 MG capsule  Commonly known as: OMNICEF     dexamethasone 1 MG tablet  Commonly known as: DECADRON          Discharge Diet:     Diet Instructions     Diet: Consistent Carbohydrate, Cardiac; Thin      Discharge Diet:  Consistent Carbohydrate  Cardiac       Fluid Consistency: Thin        Activity at Discharge:     Activity Instructions     Activity as Tolerated          Follow-up Appointments:     Follow-up Information     Provider, No Known .    Contact information:  [facility] [address] [phone]             Werner Ace MD Follow up in 1 week(s).    Specialties: Pulmonary Disease, Sleep Medicine  Contact information:  [address]  [address]  [facility] [address] [phone]                       No future appointments.  Test Results Pending at Discharge:           Brayden Fischer MD  06/14/22  13:52 EDT    Time: I spent 32 minutes on this discharge activity which included: face-to-face encounter with the patient, reviewing the data in the system, coordination of the care with the nursing  staff as well as consultants, documentation, and entering orders.     Dictated utilizing Dragon dictation.

## 2022-06-14 NOTE — PLAN OF CARE
"Goal Outcome Evaluation:  Plan of Care Reviewed With: patient        Progress: improving  Outcome Evaluation: Pt seen for OT treatment this date.  Pt sitting up in chair upon OT entry.  O2 at 3L nc with O2 sat at 98%.  Pt agreeable to UE exercises but c/o pain with \"tailbone\" at 8/10.  Pt completed B UE exercises 10 reps x 1 set with OT instruction.,  Short rest breaks after each exercise due to fatigue.  OT assisted pt with repositioning in chair, adding pillows under each hips to lessen pressure to tailbone, and reclined chair with pillows under LEs for comfort.  Pt reports more comfortable but still having pain at tailbone.  Nursing informed with pt wanting bed made so he could return to bed.  No change in c/o pain with OT visit.  "

## 2022-06-14 NOTE — PLAN OF CARE
Goal Outcome Evaluation:         Patient meets criteria for transfer to [facility]

## 2022-06-14 NOTE — THERAPY TREATMENT NOTE
Patient Name: Geen Mobley  : 3/14/1933    MRN: [id number]                              Today's Date: 2022       Admit Date: 2022    Visit Dx:     ICD-10-CM ICD-9-CM   1. Pneumonia due to COVID-19 virus  U07.1 480.8    J12.82 079.89   2. Hypoxia  R09.02 799.02   3. GOMEZ (acute kidney injury) (HCC)  N17.9 584.9     Patient Active Problem List   Diagnosis   • GOMEZ (acute kidney injury) (HCC)   • Acute respiratory failure with hypoxia (HCC)   • Essential hypertension     Past Medical History:   Diagnosis Date   • Diabetes mellitus (HCC)    • Hypertension    • Impaired functional mobility, balance, gait, and endurance      Past Surgical History:   Procedure Laterality Date   • HERNIA REPAIR        General Information     Row Name 22 1507          OT Time and Intention    Document Type therapy note (daily note)  -RM     Mode of Treatment occupational therapy  -RM     Row Name 22 1507          General Information    Patient Profile Reviewed yes  -RM           User Key  (r) = Recorded By, (t) = Taken By, (c) = Cosigned By    Initials Name Provider Type    Christie Mendoza OT Occupational Therapist                 Mobility/ADL's     Row Name 22 1508          Bed Mobility    Comment, (Bed Mobility) pt up in chair upon OT entry.  -RM     Row Name 22 1508          Bed-Chair Transfer    Comment, (Bed-Chair Transfer) pt seated in chair upon OT entry  -RM           User Key  (r) = Recorded By, (t) = Taken By, (c) = Cosigned By    Initials Name Provider Type    Christie Mendoza OT Occupational Therapist               Obj/Interventions     Row Name 22 1508          Shoulder (Therapeutic Exercise)    Shoulder (Therapeutic Exercise) AROM (active range of motion)  -RM     Shoulder AROM (Therapeutic Exercise) bilateral;flexion;extension;aBduction;aDduction;horizontal aBduction/aDduction;scapular protraction;scapular retraction;sitting;10 repetitions;other (see comments)  shoulder overhead  "press: 1 set  -RM     Row Name 06/14/22 1508          Motor Skills    Motor Skills functional endurance  -RM     Functional Endurance UE exercises to increase strength and activity tolerance for daily activity  -RM     Therapeutic Exercise shoulder  -RM     Row Name 06/14/22 1508          Balance    Balance Assessment sitting static balance;sitting dynamic balance  -RM     Static Sitting Balance modified independence  -RM     Dynamic Sitting Balance modified independence  -RM     Position, Sitting Balance supported;sitting in chair  -RM     Balance Interventions sitting;supported;static;dynamic;minimal challenge  -RM     Comment, Balance Pt completed B UE exercises seated in chair with O2 at 3L per nc  -RM           User Key  (r) = Recorded By, (t) = Taken By, (c) = Cosigned By    Initials Name Provider Type    RM Christie Leos, OT Occupational Therapist               Goals/Plan    No documentation.                Clinical Impression     Row Name 06/14/22 1511          Pain Assessment    Pretreatment Pain Rating 8/10  -RM     Posttreatment Pain Rating 8/10  -RM     Pain Location generalized  -RM     Pain Location - buttock  -RM     Pre/Posttreatment Pain Comment pt reports pain with \"tailbone\"  -RM     Pain Intervention(s) Ambulation/increased activity;Repositioned  -RM     Additional Documentation Pain Scale: Word Pre/Post-Treatment (Group)  -RM     Row Name 06/14/22 1511          Plan of Care Review    Plan of Care Reviewed With patient  -RM     Progress improving  -RM     Outcome Evaluation Pt seen for OT treatment this date.  Pt sitting up in chair upon OT entry.  O2 at 3L nc with O2 sat at 98%.  Pt agreeable to UE exercises but c/o pain with \"tailbone\" at 8/10.  Pt completed B UE exercises 10 reps x 1 set with OT instruction.,  Short rest breaks after each exercise due to fatigue.  OT assisted pt with repositioning in chair, adding pillows under each hips to lessen pressure to tailbone, and reclined chair with " pillows under LEs for comfort.  Pt reports more comfortable but still having pain at tailbone.  Nursing informed with pt wanting bed made so he could return to bed.  No change in c/o pain with OT visit.  -RM     Row Name 06/14/22 1511          Vital Signs    Pre SpO2 (%) 98  -RM     Intra SpO2 (%) 94  3  -RM     O2 Delivery Intra Treatment supplemental O2  -RM     Post SpO2 (%) 98  3  -RM     O2 Delivery Post Treatment supplemental O2  -RM     Pre Patient Position Sitting  -RM     Intra Patient Position Sitting  -RM     Post Patient Position Sitting  reclined  -RM     Row Name 06/14/22 1511          Positioning and Restraints    Pre-Treatment Position sitting in chair/recliner  -RM     Post Treatment Position chair  -RM     In Chair notified nsg;reclined;sitting;call light within reach;encouraged to call for assist;exit alarm on;legs elevated  -RM           User Key  (r) = Recorded By, (t) = Taken By, (c) = Cosigned By    Initials Name Provider Type    RM Christie Leos, OT Occupational Therapist               Outcome Measures     Row Name 06/14/22 1526          How much help from another is currently needed...    Putting on and taking off regular lower body clothing? 2  -RM     Bathing (including washing, rinsing, and drying) 2  -RM     Toileting (which includes using toilet bed pan or urinal) 2  -RM     Putting on and taking off regular upper body clothing 3  -RM     Taking care of personal grooming (such as brushing teeth) 4  -RM     Eating meals 4  -RM     AM-PAC 6 Clicks Score (OT) 17  -RM     Row Name 06/14/22 1359          How much help from another person do you currently need...    Turning from your back to your side while in flat bed without using bedrails? 3  -RMA     Moving from lying on back to sitting on the side of a flat bed without bedrails? 3  -RMA     Moving to and from a bed to a chair (including a wheelchair)? 3  -RMA     Standing up from a chair using your arms (e.g., wheelchair, bedside  chair)? 3  -RMA     Climbing 3-5 steps with a railing? 2  -RMA     To walk in hospital room? 3  -RMA     AM-PAC 6 Clicks Score (PT) 17  -RMA     Highest level of mobility 5 --> Static standing  -RMA     Row Name 06/14/22 1526 06/14/22 1359       Functional Assessment    Outcome Measure Options AM-PAC 6 Clicks Daily Activity (OT)  -RM AM-PAC 6 Clicks Basic Mobility (PT)  -RMA          User Key  (r) = Recorded By, (t) = Taken By, (c) = Cosigned By    Initials Name Provider Type    RMA Mekhi Caceres, PTA Physical Therapist Assistant    RM Christie Leos, MARK ANTHONY Occupational Therapist                Occupational Therapy Education                 Title: PT OT SLP Therapies (Resolved)     Topic: Occupational Therapy (Resolved)     Point: ADL training (Resolved)     Description:   Instruct learner(s) on proper safety adaptation and remediation techniques during self care or transfers.   Instruct in proper use of assistive devices.              Learning Progress Summary           Patient Acceptance, E,TB, VU by SD at 6/8/2022 1431    Comment: Increased activity as strength improves      Show all documentation for this point (5)                 Point: Home exercise program (Resolved)     Description:   Instruct learner(s) on appropriate technique for monitoring, assisting and/or progressing therapeutic exercises/activities.              Learning Progress Summary           Patient Acceptance, E,TB, VU by SD at 6/1/2022 1306    Comment: Benefit of OT; OT POC                   Point: Precautions (Resolved)     Description:   Instruct learner(s) on prescribed precautions during self-care and functional transfers.              Learning Progress Summary           Patient Acceptance, E,TB, VU by SD at 6/1/2022 1306    Comment: Benefit of OT; OT POC                   Point: Body mechanics (Resolved)     Description:   Instruct learner(s) on proper positioning and spine alignment during self-care, functional mobility activities  "and/or exercises.              Learning Progress Summary           Patient Acceptance, E,TB, VU by SD at 6/1/2022 1306    Comment: Benefit of OT; OT POC                               User Key     Initials Effective Dates Name Provider Type Discipline    SD 06/16/21 -  Jenifer Natarajan, MARK ANTHONY Occupational Therapist OT              OT Recommendation and Plan     Plan of Care Review  Plan of Care Reviewed With: patient  Progress: improving  Outcome Evaluation: Pt seen for OT treatment this date.  Pt sitting up in chair upon OT entry.  O2 at 3L nc with O2 sat at 98%.  Pt agreeable to UE exercises but c/o pain with \"tailbone\" at 8/10.  Pt completed B UE exercises 10 reps x 1 set with OT instruction.,  Short rest breaks after each exercise due to fatigue.  OT assisted pt with repositioning in chair, adding pillows under each hips to lessen pressure to tailbone, and reclined chair with pillows under LEs for comfort.  Pt reports more comfortable but still having pain at tailbone.  Nursing informed with pt wanting bed made so he could return to bed.  No change in c/o pain with OT visit.     Time Calculation:    Time Calculation- OT     Row Name 06/14/22 1527 06/14/22 1400          Time Calculation- OT    OT Start Time 1401  -RM --     OT Stop Time 1416  -RM --     OT Time Calculation (min) 15 min  -RM --     OT Received On 06/14/22  -RM --     OT Goal Re-Cert Due Date 06/14/22  -RM --            Timed Charges    93881 - OT Therapeutic Exercise Minutes 15  -RM --     23792 - Gait Training Minutes  -- 15  -RMA            Total Minutes    Timed Charges Total Minutes 15  -RM 15  -RMA      Total Minutes 15  -RM 15  -RMA           User Key  (r) = Recorded By, (t) = Taken By, (c) = Cosigned By    Initials Name Provider Type    RMA Mekhi Caceres, PTA Physical Therapist Assistant    Christie Mendoza, MARK ANTHONY Occupational Therapist              Therapy Charges for Today     Code Description Service Date Service Provider Modifiers Qty    " [id number]  OT THER PROC EA 15 MIN 6/14/2022 Christie Leos OT GO 1               Christie Leos OT  6/14/2022

## 2022-06-15 NOTE — CASE MANAGEMENT/SOCIAL WORK
Case Management Discharge Note                Selected Continued Care - Discharged on 6/14/2022 Admission date: 5/25/2022 - Discharge disposition: Rehab Facility or Unit (DC - External)    Destination Coordination complete.    Service Provider Selected Services Address Phone Fax Patient Preferred    [facility]  Skilled Nursing [address] [phone] --       Internal Comment last updated by Veronique Horn RN 6/13/2022 1001    They will look at referral                        Durable Medical Equipment    No services have been selected for the patient.              Dialysis/Infusion    No services have been selected for the patient.              Home Medical Care    No services have been selected for the patient.              Therapy    No services have been selected for the patient.              Community Resources    No services have been selected for the patient.              Community & DME    No services have been selected for the patient.                  Transportation Services  Ambulance: [facility]    Final Discharge Disposition Code: 03 - skilled nursing facility (SNF)

## 2022-06-16 PROBLEM — E11.9 TYPE 2 DIABETES MELLITUS WITHOUT COMPLICATIONS: Status: ACTIVE | Noted: 2022-01-01

## 2022-06-16 PROBLEM — J40 BRONCHITIS, NOT SPECIFIED AS ACUTE OR CHRONIC: Status: ACTIVE | Noted: 2022-01-01

## 2022-06-16 PROBLEM — N40.0 BENIGN PROSTATIC HYPERPLASIA WITHOUT LOWER URINARY TRACT SYMPTOMS: Status: ACTIVE | Noted: 2022-01-01

## 2022-06-19 NOTE — PROGRESS NOTES
Nursing Home History and Physical       Zheng DO Conor []  NELY Lazaro []  [address] [place] [address]  Phone: [phone]  Fax: [phone] Nithya Elliott MD []  Carlos Campos DO [x]   [address] [place] [address]  Phone: [phone]  Fax: [phone]     PATIENT NAME: Gene Mobley                                                                          YOB: 1933           DATE OF SERVICE: 06/16/2022  FACILITY:  []  [facility]   [] [facility]    [x] [facility]   [] [facility]   []  [facility]   []  Other ______________________________________________________________________    CHIEF COMPLAINT:  Nursing facility admission      HISTORY OF PRESENT ILLNESS:   Patient is an 89-year-old white male with a history of hypertension and diabetes who presented to [facility] for acute hypoxic respiratory failure secondary to COVID-19 pneumonia.  During his hospitalization, patient required up to 15 L nasal cannula.  He was treated with supportive care and IV dexamethasone during his hospitalization.  He was eventually weaned down to 3 L nasal cannula by the time of discharge.    On exam today, patient was resting comfortably in his bed but shared that he felt quite weak.  He was looking forward to getting his strength with physical therapy and seemed motivated.  Blood pressure has been in reasonable control since his admission.    PAST MEDICAL & SURGICAL HISTORY:   Past Medical History:   Diagnosis Date   • Diabetes mellitus (HCC)    • Hypertension    • Impaired functional mobility, balance, gait, and endurance       Past Surgical History:   Procedure Laterality Date   • HERNIA REPAIR           MEDICATIONS:  I have reviewed and reconciled the patients medication list in the patients chart at the skilled nursing facility on 06/16/2022.      ALLERGIES:  No Known Allergies      SOCIAL HISTORY:  Social History     Socioeconomic History   • Marital  status:    Tobacco Use   • Smoking status: Former Smoker   Vaping Use   • Vaping Use: Never used   Substance and Sexual Activity   • Alcohol use: Never   • Drug use: Never   • Sexual activity: Defer       FAMILY HISTORY:  No family history on file.     REVIEW OF SYSTEMS:  Review of Systems   Constitutional: Negative for chills, fatigue and fever.   HENT: Negative for congestion, ear pain, rhinorrhea, sinus pressure and sore throat.    Eyes: Negative for visual disturbance.   Respiratory: Negative for cough, chest tightness, shortness of breath and wheezing.    Cardiovascular: Negative for chest pain, palpitations and leg swelling.   Gastrointestinal: Negative for abdominal pain, blood in stool, constipation, diarrhea, nausea and vomiting.   Endocrine: Negative for polydipsia and polyuria.   Genitourinary: Negative for dysuria and hematuria.   Musculoskeletal: Negative for arthralgias and back pain.   Skin: Negative for rash.   Neurological: Negative for dizziness, light-headedness, numbness and headaches.   Psychiatric/Behavioral: Negative for dysphoric mood and sleep disturbance. The patient is not nervous/anxious.          PHYSICAL EXAMINATION:   VITAL SIGNS: /66   Pulse 70   Temp 97.4 °F (36.3 °C)   Resp 18   Wt 90.6 kg (199 lb 12.8 oz)   SpO2 94%   BMI 29.49 kg/m²     Physical Exam  Vitals and nursing note reviewed.   Constitutional:       Appearance: Normal appearance. He is well-developed.      Comments: Thin elderly male in no acute distress, laying comfortably in his bed.   HENT:      Head: Normocephalic and atraumatic.      Nose: Nose normal.      Mouth/Throat:      Mouth: Mucous membranes are moist.      Pharynx: No oropharyngeal exudate.   Eyes:      General: No scleral icterus.     Conjunctiva/sclera: Conjunctivae normal.      Pupils: Pupils are equal, round, and reactive to light.   Neck:      Thyroid: No thyromegaly.   Cardiovascular:      Rate and Rhythm: Normal rate and regular  rhythm.      Heart sounds: Normal heart sounds. No murmur heard.    No friction rub. No gallop.   Pulmonary:      Effort: Pulmonary effort is normal. No respiratory distress.      Breath sounds: Normal breath sounds. No wheezing.   Abdominal:      General: Bowel sounds are normal. There is no distension.      Palpations: Abdomen is soft.      Tenderness: There is no abdominal tenderness.   Musculoskeletal:         General: No deformity or signs of injury.      Cervical back: Normal range of motion and neck supple.   Lymphadenopathy:      Cervical: No cervical adenopathy.   Skin:     General: Skin is warm and dry.      Findings: No rash.   Neurological:      Mental Status: He is alert and oriented to person, place, and time.   Psychiatric:         Mood and Affect: Mood normal.         Behavior: Behavior normal.         RECORDS REVIEW:   Discharge Summary from [facility] 6/14/2022    ASSESSMENT   Diagnoses and all orders for this visit:    1. Essential hypertension (Primary)    2. Bronchitis, not specified as acute or chronic    3. Benign prostatic hyperplasia without lower urinary tract symptoms    4. Type 2 diabetes mellitus without complication, without long-term current use of insulin ([facility])    5. GOMEZ (acute kidney injury) ([facility])        PLAN    Acute hypoxic respiratory failure secondary to COVID-19 pneumonia  - Patient is currently weaning down gradually.  Continue 3 L nasal cannula.  - Start physical therapy for strengthening    GOMEZ  - Noted during hospitalization.  Monitor renal function following discharge.    Type 2 diabetes mellitus  - Currently on Levemir and NovoLog regimen with sliding scale.  Continue monitoring glucose levels 3 times daily and adjust insulin accordingly while in facility.    BPH  - Continue tamsulosin    Hypertension  - Continue amlodipine and losartan      [x]  Discussed Patient in detail with nursing/staff, addressed all needs today.     [x]  Plan of Care Reviewed   [x]   PT/OT Reviewed   [x]  Order Changes  []  Discharge Plans Reviewed  [x]  Advance Directive on file with Nursing Home.   [x]  POA on file with Nursing Home.    [x]  Code Status listed and reviewed.       Carlos Campos DO.  6/19/2022      **Part of this note may be an electronic transcription/translation of spoken language to printed text using the Dragon Dictation System.**

## 2022-06-21 NOTE — PROGRESS NOTES
Subjective   Gene Mobley is a 89 y.o. male.     Patient is an 89 yo man with a history of DM, HTN, GOMEZ, and acute respiratory failure with hypoxia. Patient wearing O2 at this time sitting up in bed and states feeling short of air. Continued use of incentive spirometer discussed. Recent chest xray shows bilateral lower lobe pneumonia.        {Common H&P Review Areas:74389}    Review of Systems   Constitutional: Negative.    HENT: Negative.    Eyes: Negative.    Respiratory: Positive for shortness of breath.    Cardiovascular: Negative.    Gastrointestinal: Negative.    Endocrine: Negative.    Genitourinary: Negative.    Musculoskeletal: Negative.    Skin: Negative.    Neurological: Negative.    Hematological: Negative.    Psychiatric/Behavioral: Negative.        Objective   Physical Exam  Constitutional:       Appearance: Normal appearance.   Cardiovascular:      Rate and Rhythm: Normal rate and regular rhythm.      Pulses: Normal pulses.      Heart sounds: Normal heart sounds.   Pulmonary:      Effort: Tachypnea and respiratory distress present.      Breath sounds: Examination of the right-lower field reveals decreased breath sounds. Examination of the left-lower field reveals decreased breath sounds. Decreased breath sounds present.   Abdominal:      General: Bowel sounds are normal.      Palpations: Abdomen is soft.   Musculoskeletal:         General: Normal range of motion.      Cervical back: Normal range of motion.   Skin:     General: Skin is warm and dry.   Neurological:      General: No focal deficit present.      Mental Status: He is alert and oriented to person, place, and time. Mental status is at baseline.   Psychiatric:         Mood and Affect: Mood normal.         Behavior: Behavior normal.         Thought Content: Thought content normal.         Judgment: Judgment normal.         Assessment & Plan   {Assess/PlanSmartLinks:57672}              Acute respiratory failure/dyspnea  -Continue antibiotics  for pneumonia  -Continue using incentive spirometer  -Move around/ambulate as tolerated

## 2022-06-23 NOTE — PROGRESS NOTES
Nursing Home Follow Up Note      Zheng Perdomo DO []   NELY Lazaro [x]  [address] [place] [address]  Phone: [phone]  Fax: [phone] Nithya Elliott MD []    Carlos Campos DO []   [address] [place] [address]  Phone: [phone]  Fax: [phone]     PATIENT NAME: Gene Mobley                                                                          YOB: 1933           DATE OF SERVICE: 06/21/2022  FACILITY:  [][facility]   [] [facility]   [x] [facility]   [] [facility]   [] Other ______________________________________________________________________      CHIEF COMPLAINT:    Follow up cough, congestion, shortness of breath and abnormal CXR.      HISTORY OF PRESENT ILLNESS:     89 yo male with PMH of DM, HTN, CKD and BPH.  He initially presented to the hospital with upper respiratory symptoms and shortness of breath and was diagnosed with respiratory failure due to COVID pneumonia.  He initially required 15 L O2 but was titrated to 3 L.  He was treated with 10-day course of steroids but was not a candidate for other treatment.  He had complaints of increased shortness of breath yesterday so chest x-ray performed and continue to report bilateral lobe pneumonia.  Did start cefdinir and Zithromax as well as every 6 hour DuoNebs.  Patient reports still having some shortness of breath but no hypoxia.  He has been able to work with PT.      PAST MEDICAL & SURGICAL HISTORY:   Past Medical History:   Diagnosis Date   • Diabetes mellitus (HCC)    • Hypertension    • Impaired functional mobility, balance, gait, and endurance       Past Surgical History:   Procedure Laterality Date   • HERNIA REPAIR           MEDICATIONS:  I have reviewed and reconciled the patients medication list in the patients chart at the skilled nursing facility today.      ALLERGIES:    No Known Allergies      SOCIAL HISTORY:    Social History     Socioeconomic History   • Marital status:     Tobacco Use   • Smoking status: Former Smoker   Vaping Use   • Vaping Use: Never used   Substance and Sexual Activity   • Alcohol use: Never   • Drug use: Never   • Sexual activity: Defer       FAMILY HISTORY:    No family history on file.    REVIEW OF SYSTEMS:    Review of Systems   Constitutional: Negative for activity change, appetite change, chills, diaphoresis, fatigue, fever, unexpected weight gain and unexpected weight loss.   HENT: Negative for congestion, ear pain, mouth sores, nosebleeds, postnasal drip, rhinorrhea, sinus pressure, sneezing, sore throat, swollen glands and trouble swallowing.    Eyes: Negative for pain, discharge, redness and itching.   Respiratory: Positive for apnea, cough and shortness of breath. Negative for chest tightness.    Cardiovascular: Negative for chest pain, palpitations and leg swelling.   Gastrointestinal: Negative for abdominal distention, abdominal pain, blood in stool, constipation, diarrhea, nausea, vomiting, GERD and indigestion.   Endocrine: Negative for polydipsia and polyuria.   Genitourinary: Negative for decreased urine volume, difficulty urinating, dysuria, flank pain, frequency, hematuria, urgency and urinary incontinence.   Musculoskeletal: Positive for arthralgias. Negative for back pain, gait problem, joint swelling and myalgias.   Skin: Negative for color change, dry skin, rash and skin lesions.   Allergic/Immunologic: Negative for environmental allergies.   Neurological: Positive for weakness and memory problem. Negative for dizziness, seizures, speech difficulty and confusion.   Psychiatric/Behavioral: Negative for behavioral problems, dysphoric mood, hallucinations, sleep disturbance and depressed mood. The patient is not nervous/anxious.          PHYSICAL EXAMINATION:   VITAL SIGNS:   Vitals:    06/21/22 1256   BP: 128/72   Pulse: 74   Resp: 18   Temp: 98 °F (36.7 °C)   SpO2: 98%   Weight: 90.6 kg (199 lb 12.8 oz)       Physical Exam  Vitals and  nursing note reviewed.   Constitutional:       Appearance: He is well-developed.   HENT:      Head: Normocephalic.      Right Ear: External ear normal.      Left Ear: External ear normal.      Nose: Nose normal.   Eyes:      Conjunctiva/sclera: Conjunctivae normal.   Cardiovascular:      Rate and Rhythm: Normal rate and regular rhythm.      Heart sounds: Normal heart sounds.   Pulmonary:      Effort: Pulmonary effort is normal. No respiratory distress.      Breath sounds: Examination of the right-lower field reveals decreased breath sounds. Examination of the left-lower field reveals decreased breath sounds. Decreased breath sounds present. No wheezing or rales.   Abdominal:      General: Bowel sounds are normal. There is no distension.      Palpations: Abdomen is soft.      Tenderness: There is no abdominal tenderness.   Musculoskeletal:      Cervical back: Normal range of motion.      Comments: NROM all major joints   Skin:     General: Skin is warm and dry.      Findings: No rash.   Neurological:      Mental Status: He is alert and oriented to person, place, and time.   Psychiatric:         Mood and Affect: Mood and affect normal.         Speech: Speech normal.         Behavior: Behavior normal.         Cognition and Memory: Cognition is not impaired. Memory is not impaired.         RECORDS REVIEW:   I have reviewed and interpreted the records in [facility]    ASSESSMENT     Diagnoses and all orders for this visit:    1. Acute respiratory failure with hypoxia (HCC) (Primary)    2. Pneumonia of both lower lobes due to infectious organism    3. Shortness of breath    4. History of COVID-19        PLAN    Resp failure/Pneumonia BLL/SOA/history COVID  -Continue Cefdinir and Zithromax, and Duo Nebs until completion. Encourage patient to use IS every hour while awake. Will follow up and continue to monitor.     Nursing encouraged to keep me informed of any acute changes, lack of improvement, or any new concerning  symptoms.    Continue supportive care for all ADLs.    [x]  Discussed Patient in detail with nursing/staff, addressed all needs today.     [x]  Plan of Care Reviewed   [x]  PT/OT Reviewed   [x]  Order Changes  [x]  Discharge Plans Reviewed  [x]  Advance Directive on file with Nursing Home.   [x]  POA on file with Nursing Home.   [x]  Code Status listed: []  Full Code   []  DNR              NELY Dumont.

## 2022-07-03 NOTE — PROGRESS NOTES
Nursing Home Follow Up Note      Zheng Perdomo DO []   NELY Lazaro [x]  [address] [place] [address]  Phone: [phone]  Fax: [phone] Nithya Elliott MD []    Carlos Campos DO []   [address] [place] [address]  Phone: [phone]  Fax: [phone]     PATIENT NAME: Gene Mobley                                                                          YOB: 1933           DATE OF SERVICE: 06/30/2022  FACILITY:  [][place]   [] [place]   [x] [facility]   [] [facility]   [] Other ______________________________________________________________________      CHIEF COMPLAINT:    Discharging home today.      HISTORY OF PRESENT ILLNESS:     88 yo male with PMH of DM, HTN, CKD and BPH.  He initially presented to the hospital with upper respiratory symptoms and shortness of breath and was diagnosed with respiratory failure due to COVID pneumonia.  He initially required 15 L O2 but was titrated to 3 L.  He was treated with 10-day course of steroids but was not a candidate for other treatment.  He was admitted to facility for rehabilitation and strengthening status post hospitalization for COVID.  He was treated with cefdinir, Zithromax and DuoNebs during hospitalization for continued pneumonia.  This treatment completed and signs and symptoms resolved.  He has been working well with therapy and doing very well but his insurance will no longer pay for his admission for rehabilitation and strengthening so patient is going home today.  He feels he is okay to go home and will be taken care of by his family and other family members who are at bedside today.  He will also continue home health.  His conditions have been well controlled since admitting to the facility.  Patient was taking Lantus 40 units twice daily at home prior to admission but this was decreased during hospitalization due to episodes of hypoglycemia.  He has been on Levemir since in the facility, related to  coverage cost.  Patient and family advised that patient can start taking Lantus when he gets home since he has multiple vials of this, he has been taking it for several years and insurance covers it.  He has also been using sliding scale during admission.  Blood sugars have been well controlled.    PAST MEDICAL & SURGICAL HISTORY:   Past Medical History:   Diagnosis Date   • Diabetes mellitus (HCC)    • Hypertension    • Impaired functional mobility, balance, gait, and endurance       Past Surgical History:   Procedure Laterality Date   • HERNIA REPAIR           MEDICATIONS:  I have reviewed and reconciled the patients medication list in the patients chart at the skilled nursing [facility] today.      ALLERGIES:    No Known Allergies      SOCIAL HISTORY:    Social History     Socioeconomic History   • Marital status:    Tobacco Use   • Smoking status: Former Smoker   Vaping Use   • Vaping Use: Never used   Substance and Sexual Activity   • Alcohol use: Never   • Drug use: Never   • Sexual activity: Defer       FAMILY HISTORY:    No family history on file.    REVIEW OF SYSTEMS:    Review of Systems   Constitutional: Negative for activity change, appetite change, chills, diaphoresis, fatigue, fever, unexpected weight gain and unexpected weight loss.   HENT: Negative for congestion, ear pain, mouth sores, nosebleeds, postnasal drip, rhinorrhea, sinus pressure, sneezing, sore throat, swollen glands and trouble swallowing.    Eyes: Negative for pain, discharge, redness and itching.   Respiratory: Positive for apnea. Negative for cough, chest tightness and shortness of breath.    Cardiovascular: Negative for chest pain, palpitations and leg swelling.   Gastrointestinal: Negative for abdominal distention, abdominal pain, blood in stool, constipation, diarrhea, nausea, vomiting, GERD and indigestion.   Endocrine: Negative for polydipsia and polyuria.   Genitourinary: Negative for decreased urine volume, difficulty  urinating, dysuria, flank pain, frequency, hematuria, urgency and urinary incontinence.   Musculoskeletal: Positive for arthralgias (chronic). Negative for back pain, gait problem, joint swelling and myalgias.   Skin: Negative for color change, dry skin, rash and skin lesions.   Allergic/Immunologic: Negative for environmental allergies.   Neurological: Positive for weakness. Negative for dizziness, seizures, speech difficulty, memory problem and confusion.   Psychiatric/Behavioral: Negative for behavioral problems, dysphoric mood, hallucinations, sleep disturbance and depressed mood. The patient is not nervous/anxious.          PHYSICAL EXAMINATION:   VITAL SIGNS:   Vitals:    06/30/22 1221   BP: 128/74   Pulse: 80   Resp: 18   Temp: 97.4 °F (36.3 °C)   SpO2: 97%   Weight: 89.8 kg (197 lb 14.4 oz)       Physical Exam  Vitals and nursing note reviewed.   Constitutional:       Appearance: He is well-developed.   HENT:      Head: Normocephalic.      Right Ear: External ear normal.      Left Ear: External ear normal.      Nose: Nose normal.   Eyes:      Conjunctiva/sclera: Conjunctivae normal.   Cardiovascular:      Rate and Rhythm: Normal rate and regular rhythm.      Heart sounds: Normal heart sounds.   Pulmonary:      Effort: Pulmonary effort is normal. No respiratory distress.      Breath sounds: Examination of the right-lower field reveals decreased breath sounds. Examination of the left-lower field reveals decreased breath sounds. Decreased breath sounds present. No wheezing or rales.   Abdominal:      General: Bowel sounds are normal. There is no distension.      Palpations: Abdomen is soft.      Tenderness: There is no abdominal tenderness.   Musculoskeletal:      Cervical back: Normal range of motion.      Comments: NROM all major joints   Skin:     General: Skin is warm and dry.      Findings: No rash.   Neurological:      Mental Status: He is alert and oriented to person, place, and time.   Psychiatric:          Mood and Affect: Mood and affect normal.         Speech: Speech normal.         Behavior: Behavior normal.         Cognition and Memory: Cognition is not impaired. Memory is not impaired.         RECORDS REVIEW:   I have reviewed and interpreted the records in [facility]    ASSESSMENT     Diagnoses and all orders for this visit:    1. History of pneumonia (Primary)    2. Personal history of COVID-19    3. History of respiratory failure    4. Essential hypertension    5. Type 2 diabetes mellitus without complication, with long-term current use of insulin ([facility])    6. Benign prostatic hyperplasia without lower urinary tract symptoms        PLAN    Patient discharging home today with wife and other family members.  He will continue home health.    Resp failure/Pneumonia BLL/SOA/history COVID  -Signs and symptoms have all resolved at this time.  He will follow-up with PCP for any other issues.    DM/hypertension/BPH  -Patient will take Lantus 20 units twice daily and use NovoLog sliding scale but sliding scale does not start until greater than 200.  Patient and family educated on this and also given written instructions. They voiced understanding of all instructions and denied further questions.  He will continue to follow-up with PCP for management of his chronic conditions.    Nursing encouraged to keep me informed of any acute changes, lack of improvement, or any new concerning symptoms.    Continue supportive care for all ADLs.    [x]  Discussed Patient in detail with nursing/staff, addressed all needs today.     [x]  Plan of Care Reviewed   [x]  PT/OT Reviewed   [x]  Order Changes  [x]  Discharge Plans Reviewed  [x]  Advance Directive on file with Nursing Home.   [x]  POA on file with Nursing Home.   [x]  Code Status listed: []  Full Code   []  DNR        “I confirm accuracy of unchanged data/findings which have been carried forward from previous visit, as well as I have updated appropriately those that have  changed.”    I spent 45  minutes on discharge for Gene on this date of service. This time includes time spent by me in the following activities:preparing for the visit, reviewing tests, obtaining and/or reviewing a separately obtained history, performing a medically appropriate examination and/or evaluation , counseling and educating the patient/family/caregiver, ordering medications, tests, or procedures, referring and communicating with other health care professionals , documenting information in the medical record, independently interpreting results and communicating that information with the patient/family/caregiver and care coordination      NELY Dumont.

## 2022-08-02 PROBLEM — J18.9 PNEUMONIA OF BOTH LOWER LOBES DUE TO INFECTIOUS ORGANISM: Status: ACTIVE | Noted: 2022-01-01

## 2022-08-02 PROBLEM — A41.9 SEPSIS DUE TO PNEUMONIA: Status: ACTIVE | Noted: 2022-01-01

## 2022-08-02 PROBLEM — J18.9 SEPSIS DUE TO PNEUMONIA: Status: ACTIVE | Noted: 2022-01-01

## 2022-08-02 NOTE — ED PROVIDER NOTES
Subjective   89-year-old male with past medical history of hypertension and diabetes presents to the emergency department by EMS for respiratory distress.  EMS reports that wife called them because patient was really having problems breathing despite being on his 3 L nasal cannula they report when they arrived he was at 60% and really tachypneic.  Wife told them that patient was admitted to the hospital back in May for COVID-pneumonia and has never really regained his respiratory status since then.  She reports that for the last 2 days he has had shortness of breath but then tonight it really worsened.      History provided by:  Patient and EMS personnel  Shortness of Breath  Severity:  Severe  Onset quality:  Gradual  Timing:  Constant  Progression:  Unchanged  Chronicity:  New  Relieved by:  Nothing  Worsened by:  Nothing  Ineffective treatments:  None tried  Associated symptoms: cough and wheezing    Associated symptoms: no abdominal pain, no chest pain and no fever    Risk factors: obesity        Review of Systems   Constitutional: Negative.  Negative for fever.   HENT: Negative.    Respiratory: Positive for cough, shortness of breath and wheezing.    Cardiovascular: Negative.  Negative for chest pain.   Gastrointestinal: Negative.  Negative for abdominal pain.   Endocrine: Negative.    Genitourinary: Negative.  Negative for dysuria.   Skin: Negative.    Neurological: Negative.    Psychiatric/Behavioral: Negative.    All other systems reviewed and are negative.      Past Medical History:   Diagnosis Date   • Diabetes mellitus (HCC)    • Hypertension    • Impaired functional mobility, balance, gait, and endurance        No Known Allergies    Past Surgical History:   Procedure Laterality Date   • HERNIA REPAIR         History reviewed. No pertinent family history.    Social History     Socioeconomic History   • Marital status:    Tobacco Use   • Smoking status: Former Smoker   Vaping Use   • Vaping Use:  Never used   Substance and Sexual Activity   • Alcohol use: Never   • Drug use: Never   • Sexual activity: Defer           Objective   Physical Exam  Constitutional:       General: He is in acute distress.      Appearance: He is ill-appearing, toxic-appearing and diaphoretic.   HENT:      Head: Normocephalic and atraumatic.   Eyes:      Pupils: Pupils are equal, round, and reactive to light.   Cardiovascular:      Rate and Rhythm: Normal rate and regular rhythm.   Pulmonary:      Effort: Tachypnea present.      Breath sounds: Decreased breath sounds and wheezing present.   Musculoskeletal:         General: Normal range of motion.      Cervical back: Normal range of motion.   Skin:     General: Skin is warm.      Capillary Refill: Capillary refill takes less than 2 seconds.   Neurological:      General: No focal deficit present.      Mental Status: He is alert.   Psychiatric:         Mood and Affect: Mood normal.         Procedures           ED Course  ED Course as of 08/02/22 1957   Tue Aug 02, 2022   0325 EKG interpretation time is 3:04 AM sinus tachycardia 100 bpm there is a right bundle branch block present QRS duration is 128 QT is 372 QTC is 425 and is consistent with an abnormal EKG. [MH]      ED Course User Index  [MH] Anastasiia Ontiveros,                                            MDM  Number of Diagnoses or Management Options  Acute respiratory failure with hypoxia (HCC): new and requires workup  Multifocal pneumonia: new and requires workup  Diagnosis management comments: 89-year-old male presents emergency department with hypertension and diabetes in respiratory distress.  Per family and EMS patient has never regained his full lung capacity since he had COVID-pneumonia in May.  For the last couple days he has had worsening shortness of breath then tonight around 2 AM it became very severe despite his normal oxygen requirements so they called 911 when EMS arrived he was in the 60s on his normal  requirements and had severe wheezing.  Patient is currently on a 10 L simple mask he has an elevated white blood cell count, elevated lactic acid.  Chest x-ray shows concerns for edema versus multifocal pneumonia.  I discussed this case with the hospitalist service who recommend with his recent history of COVID-19 to recent swab him and get a CT PE protocol since his kidney function is okay.  PE protocol reveals a multifocal pneumonia and edema will admit to their service for further management and treatment of the acute respiratory failure with hypoxia and multifocal pneumonia.       Amount and/or Complexity of Data Reviewed  Clinical lab tests: ordered and reviewed  Tests in the radiology section of CPT®: ordered and reviewed  Tests in the medicine section of CPT®: reviewed and ordered  Discuss the patient with other providers: yes  Independent visualization of images, tracings, or specimens: yes        Final diagnoses:   Acute respiratory failure with hypoxia (HCC)   Multifocal pneumonia       ED Disposition  ED Disposition     ED Disposition   Decision to Admit    Condition   --    Comment   Level of Care: Telemetry [5]   Diagnosis: Acute respiratory failure with hypoxia (HCC) [655181]   Admitting Physician: YOANA LALA [[id number]]   Attending Physician: YOANA LALA [[id number]]   Isolate for COVID?: Yes [1]   Certification: I Certify That Inpatient Hospital Services Are Medically Necessary For Greater Than 2 Midnights               No follow-up provider specified.       Medication List      No changes were made to your prescriptions during this visit.          Anastasiia Ontiveros,   08/02/22 1939       Anastasiia Ontiveros,   08/02/22 1957

## 2022-08-02 NOTE — H&P
[facility]   HISTORY AND PHYSICAL      Name:  Gene Molbey   Age:  89 y.o.  Sex:  male  :  3/14/1933  MRN:  [id number]   Visit Number:  [id number]  Admission Date:  2022  Date Of Service:  22  Primary Care Physician:  Provider, No Known    Chief Complaint:     Shortness of breath.    History Of Presenting Illness:      Mr. Mobley is an 89-year-old male with history of diabetes mellitus type 2 on insulin, hypertension was brought to the emergency room by EMS for respiratory distress.  Patient's wife called EMS since the patient has been progressively worsening with regards to shortness of breath.  When the EMS arrived patient's pulse oxygen saturations were 60% and he was tachypneic.    Patient was recently admitted to this hospital on 2022 and was discharged on 2022 and at that time he was treated for COVID-19 pneumonia initially requiring high flow oxygen and was subsequently discharged home on 3 L of nasal cannula oxygen.    In the emergency room, he was afebrile with a heart rate of 101, respiratory to 28, blood pressure in the 190s systolic and a pulse oxygen saturation of 94% on simple facemask at 10 L.  Initial ABG on facemask showed a pH of 7.41, PCO2 41, PO2 of 72 and bicarb of 26.  Initial troponin was was 0.029 with subsequent at 0.067 and lactic acid was 3.3 with repeat at 2.1.  Urine analysis within normal limits.  CMP was remarkable for a glucose of 272 but otherwise unremarkable.  WBC was 18 with a hemoglobin of 9.  Lipase and procalcitonin were within normal limits.  proBNP was 1469.  Chest x-ray on my read showed bilateral nonhomogenous patchy opacities seen mid and lower zone.  CTA of the chest was negative for pulmonary embolism but showed bilateral patchy pneumonia.  Gallbladder with gallstones and wall thickening noted.  Bilateral renal cysts noted.    Review Of Systems:    All systems were reviewed and negative except as mentioned in history of  presenting illness, assessment and plan.    Past Medical History: Patient  has a past medical history of Diabetes mellitus (HCC), Hypertension, and Impaired functional mobility, balance, gait, and endurance.    Past Surgical History: Patient  has a past surgical history that includes Hernia repair.    Social History: Patient  reports that he has quit smoking. He does not have any smokeless tobacco history on file. He reports that he does not drink alcohol and does not use drugs.    Family History: Nothing significant to the current illness.    Allergies:      Patient has no known allergies.    Home Medications:    Prior to Admission Medications     Prescriptions Last Dose Informant Patient Reported? Taking?    albuterol sulfate  (90 Base) MCG/ACT inhaler   No No    Inhale 2 puffs Every 4 (Four) Hours As Needed for Wheezing or Shortness of Air for up to 30 doses.    amLODIPine (NORVASC) 10 MG tablet   Yes No    Take 10 mg by mouth Daily.    carvedilol (COREG) 6.25 MG tablet   No No    Take 2 tablets by mouth 2 (Two) Times a Day With Meals for 30 days.    insulin detemir (LEVEMIR) 100 UNIT/ML injection   No No    Inject 20 Units under the skin into the appropriate area as directed Every Night for 30 days.    insulin glargine (LANTUS, SEMGLEE) 100 UNIT/ML injection   Yes No    Inject  under the skin into the appropriate area as directed 2 (Two) Times a Day. Sliding scale dosing    losartan (COZAAR) 50 MG tablet   No No    Take 1 tablet by mouth Daily for 30 days.    tamsulosin (FLOMAX) 0.4 MG capsule 24 hr capsule   Yes No    Take 1 capsule by mouth Daily.        ED Medications:    Medications   sodium chloride 0.9 % flush 10 mL (has no administration in time range)   vancomycin 2000 mg/500 mL 0.9% NS IVPB ([facility]) (2,000 mg Intravenous New Bag 8/2/22 5273)   methylPREDNISolone sodium succinate (SOLU-Medrol) injection 125 mg (125 mg Intravenous Given 8/2/22 0310)   furosemide (LASIX) injection 80 mg (80 mg  Intravenous Given 8/2/22 7409)   cefepime (MAXIPIME) IVPB 2 g/50ml D5W (premix) (0 g Intravenous Stopped 8/2/22 0588)   iopamidol (ISOVUE-300) 61 % injection 100 mL (100 mL Intravenous Given 8/2/22 1480)     Vital Signs:  Temp:  [98.5 °F (36.9 °C)] 98.5 °F (36.9 °C)  Heart Rate:  [] 86  Resp:  [28] 28  BP: (109-190)/(61-73) 159/61        08/02/22  0305   Weight: 95.3 kg (210 lb)     Body mass index is 31.01 kg/m².    Physical Exam:     Most recent vital Signs: /61   Pulse 86   Temp 98.5 °F (36.9 °C) (Oral)   Resp 28   Wt 95.3 kg (210 lb)   SpO2 97%   BMI 31.01 kg/m²     Physical Exam  Constitutional:       General: He is not in acute distress.     Appearance: Normal appearance. He is ill-appearing.   HENT:      Head: Normocephalic and atraumatic.      Right Ear: External ear normal.      Left Ear: External ear normal.      Nose: Nose normal.      Mouth/Throat:      Mouth: Mucous membranes are moist.   Eyes:      Extraocular Movements: Extraocular movements intact.      Conjunctiva/sclera: Conjunctivae normal.   Cardiovascular:      Rate and Rhythm: Normal rate and regular rhythm.      Pulses: Normal pulses.      Heart sounds: Normal heart sounds. No murmur heard.  Pulmonary:      Effort: Pulmonary effort is normal.      Breath sounds: Rales present. No wheezing.      Comments: Bilateral basal crackles heard.  Abdominal:      General: Bowel sounds are normal.      Palpations: Abdomen is soft.      Tenderness: There is no abdominal tenderness. There is no guarding or rebound.   Musculoskeletal:         General: Normal range of motion.      Cervical back: Neck supple. No rigidity.      Right lower leg: No edema.      Left lower leg: No edema.   Skin:     General: Skin is warm.      Findings: No erythema or rash.   Neurological:      General: No focal deficit present.      Mental Status: He is alert and oriented to person, place, and time. Mental status is at baseline.   Psychiatric:         Mood and  Affect: Mood normal.         Behavior: Behavior normal.       Laboratory data:    I have reviewed the labs done in the emergency room.    Results from last 7 days   Lab Units 08/02/22  0309   SODIUM mmol/L 136   POTASSIUM mmol/L 4.6   CHLORIDE mmol/L 103   CO2 mmol/L 21.2*   BUN mg/dL 18   CREATININE mg/dL 1.00   CALCIUM mg/dL 8.8   BILIRUBIN mg/dL 0.4   ALK PHOS U/L 112   ALT (SGPT) U/L 13   AST (SGOT) U/L 18   GLUCOSE mg/dL 272*     Results from last 7 days   Lab Units 08/02/22  0309   WBC 10*3/mm3 18.02*   HEMOGLOBIN g/dL 9.0*   HEMATOCRIT % 28.4*   PLATELETS 10*3/mm3 270         Results from last 7 days   Lab Units 08/02/22  0535 08/02/22  0309   TROPONIN T ng/mL 0.067* 0.029     Results from last 7 days   Lab Units 08/02/22  0309   PROBNP pg/mL 1,469.0         Results from last 7 days   Lab Units 08/02/22  0309   LIPASE U/L 20     Results from last 7 days   Lab Units 08/02/22  0401   PH, ARTERIAL pH units 7.410   PO2 ART mm Hg 71.9*   PCO2, ARTERIAL mm Hg 40.8   HCO3 ART mmol/L 25.8     Results from last 7 days   Lab Units 08/02/22  0438   COLOR UA  Yellow   GLUCOSE UA  Negative   KETONES UA  Negative   LEUKOCYTES UA  Small (1+)*   PH, URINE  <=5.0   BILIRUBIN UA  Negative   UROBILINOGEN UA  0.2 E.U./dL   RBC UA /HPF None Seen   WBC UA /HPF 3-5*     EKG:      EKG done in the emergency room was reviewed by me.  It shows sinus tachycardia at 100 bpm.  Normal axis.  Right bundle branch block is noted with nonspecific ST-T changes.    Radiology:    CT Angiogram Chest Pulmonary Embolism    Result Date: 8/2/2022  FINAL REPORT TECHNIQUE: Then section axial CT images of the chest were obtained with contrast.  Three-D reformatted images were also obtained.This study was performed with techniques to keep radiation doses as low as reasonably achievable (ALARA). Individualized dose reduction techniques using automated exposure control or adjustment of mA and/or kV according to the patient''s size were employed. CLINICAL  HISTORY: Pulmonary embolism (PE) suspected, unknown D-dimer FINDINGS: The lower lobe branches are suboptimally visualized due to motion artifact. There is no evidence of pulmonary embolism. There is no evidence of thoracic aortic aneurysm or dissection. There are several mildly enlarged mediastinal and hilar lymph nodes that are nonspecific and could be reactive.  There are bilateral pulmonary opacities worrisome for pneumonia.  There are small to moderate pleural effusions.  There is left pleural calcification.    Limited images of the upper abdomen demonstrate multiple gallstones in the gallbladder with gallbladder wall thickening.  There is a 20 mm mass in the right kidney that does not appear to represent a simple cyst and is favored to represent a complex cyst.  There are other probable complex renal cysts bilaterally.     Suboptimal visualization of the lower lobe branches.  No evidence of pulmonary embolism.    Bilateral pulmonary opacities worrisome for bilateral pneumonia with small to moderate bilateral pleural effusions.    Cholelithiasis with gallbladder wall thickening.    Renal masses favoring complex cysts.  If indicated, renal mass protocol CT could further evaluate. Mildly enlarged mediastinal and hilar lymph nodes could be reactive. Authenticated and Electronically Signed by Jair Benitez III, MD on 08/02/2022 06:48:38 AM    Assessment:    1. Acute on chronic hypoxic respiratory failure, POA.  2. Bilateral healthcare associated pneumonia, POA.  3. Diabetes mellitus type 2.  4. Essential hypertension.    Plan:    Mr. Mobley is currently being admitted to the medical floor with telemetry as an inpatient.    Respiratory failure/bilateral pneumonia.  - Continue nonrebreather mask with 10 L of oxygen.  His baseline home oxygen is around 3 L.  - Continue broad-spectrum IV antibiotics therapy with cefepime and vancomycin, pharmacy to dose.  - We will obtain nasal swab for MRSA and sputum cultures.  - He  will be maintained on bronchodilators and mucolytic agents.  - At this time there is no indication of continuation of steroid therapy.    Diabetes mellitus type 2/hypertension.  - We will place him on subcutaneous insulin protocol for coverage.  - We will continue half dose of home dose of Levemir.  - Continue antihypertensive medications.  - Hemoglobin A1c 8.4 on 5/26/2022.    I discussed advanced directives with the patient that he has a living will and wants to be DNR/DNI.  I discussed the patient's condition and treatment plan with his wife Melissa over the phone.  She agrees with his CODE STATUS.    Risk Assessment: Moderate  DVT Prophylaxis: Lovenox  Code Status: DNR/DNI  Diet: Cardiac diabetic    Advance Care Planning      ACP discussion was held with the patient during this visit. Patient has an advance directive in EMR which is still valid.          Abdoulaye Alcantara MD  08/02/22  06:56 EDT    Dictated utilizing Dragon dictation.

## 2022-08-03 NOTE — PLAN OF CARE
Goal Outcome Evaluation:  Plan of Care Reviewed With: patient        Progress: no change  Outcome Evaluation: VSS; continue antibiotics; monitor O2

## 2022-08-03 NOTE — PLAN OF CARE
Goal Outcome Evaluation:  Plan of Care Reviewed With: patient        Progress: no change  Outcome Evaluation: Pt seen for Pt eval this date. Pt transferred to EOB with SBA. Pt was able to stand with Jamaal and take steps over to chair 3 ft with Rwx. Pt did lose his balance with short distance gait requiring assist to correct. Pt is expected to benefit from skilled PTx during this inpatient stay.

## 2022-08-03 NOTE — THERAPY EVALUATION
Patient Name: Gene Mobley  : 3/14/1933    MRN: [id number]                              Today's Date: 8/3/2022       Admit Date: 2022    Visit Dx:     ICD-10-CM ICD-9-CM   1. Acute respiratory failure with hypoxia (HCC)  J96.01 518.81   2. Multifocal pneumonia  J18.9 486     Patient Active Problem List   Diagnosis   • GOMEZ (acute kidney injury) (HCC)   • Acute respiratory failure with hypoxia (HCC)   • Essential hypertension   • Bronchitis, not specified as acute or chronic   • Type 2 diabetes mellitus without complications (HCC)   • Benign prostatic hyperplasia without lower urinary tract symptoms   • Sepsis due to pneumonia (HCC)   • Pneumonia of both lower lobes due to infectious organism     Past Medical History:   Diagnosis Date   • Diabetes mellitus (HCC)    • Hypertension    • Impaired functional mobility, balance, gait, and endurance      Past Surgical History:   Procedure Laterality Date   • HERNIA REPAIR        General Information     Row Name 22 1301          Physical Therapy Time and Intention    Document Type evaluation  -MS     Mode of Treatment physical therapy  -MS     Row Name 22 1301          General Information    Patient Profile Reviewed yes  -MS     Prior Level of Function independent:;all household mobility;transfer  -MS     Existing Precautions/Restrictions fall;oxygen therapy device and L/min  -MS     Barriers to Rehab medically complex;previous functional deficit  -MS     Row Name 22 1301          Living Environment    People in Home spouse  -MS     Row Name 22 1301          Home Main Entrance    Number of Stairs, Main Entrance none  -MS     Stair Railings, Main Entrance none  -MS     Row Name 22 1301          Stairs Within Home, Primary    Stairs, Within Home, Primary ramp entrance  -MS     Row Name 22 1327          Cognition    Orientation Status (Cognition) oriented x 4  -MS     Row Name 22 1301          Safety Issues, Functional Mobility     Safety Issues Affecting Function (Mobility) safety precaution awareness;safety precautions follow-through/compliance  -MS     Impairments Affecting Function (Mobility) balance;endurance/activity tolerance;shortness of breath;strength  -MS           User Key  (r) = Recorded By, (t) = Taken By, (c) = Cosigned By    Initials Name Provider Type    Teodoro Phillips PT Physical Therapist               Mobility     Row Name 08/03/22 1327          Bed Mobility    Bed Mobility supine-sit  -MS     Supine-Sit Bland (Bed Mobility) standby assist  -MS     Assistive Device (Bed Mobility) bed rails;head of bed elevated  -MS     Row Name 08/03/22 1327          Bed-Chair Transfer    Bed-Chair Bland (Transfers) minimum assist (75% patient effort)  -MS     Assistive Device (Bed-Chair Transfers) walker, front-wheeled  -MS     Row Name 08/03/22 1327          Sit-Stand Transfer    Sit-Stand Bland (Transfers) minimum assist (75% patient effort)  -MS     Assistive Device (Sit-Stand Transfers) walker, front-wheeled  -MS     Row Name 08/03/22 1327          Gait/Stairs (Locomotion)    Bland Level (Gait) minimum assist (75% patient effort)  -MS     Assistive Device (Gait) walker, front-wheeled  -MS     Distance in Feet (Gait) 3  -MS     Deviations/Abnormal Patterns (Gait) festinating/shuffling;alba decreased;base of support, narrow  -MS     Bilateral Gait Deviations forward flexed posture  -MS           User Key  (r) = Recorded By, (t) = Taken By, (c) = Cosigned By    Initials Name Provider Type    Teodoro Phillips PT Physical Therapist               Obj/Interventions     Row Name 08/03/22 1328          Range of Motion Comprehensive    General Range of Motion bilateral lower extremity ROM WFL  -MS     Row Name 08/03/22 1328          Strength Comprehensive (MMT)    General Manual Muscle Testing (MMT) Assessment lower extremity strength deficits identified  -MS     Comment, General Manual Muscle Testing  (MMT) Assessment B LE 4-/5  -MS     Row Name 08/03/22 1328          Balance    Balance Assessment standing dynamic balance;standing static balance  -MS     Static Standing Balance minimal assist  -MS     Dynamic Standing Balance minimal assist  -MS     Position/Device Used, Standing Balance walker, front-wheeled  -MS     Row Name 08/03/22 1328          Sensory Assessment (Somatosensory)    Sensory Assessment (Somatosensory) sensation intact  -MS           User Key  (r) = Recorded By, (t) = Taken By, (c) = Cosigned By    Initials Name Provider Type    Teodoro Phillips, PT Physical Therapist               Goals/Plan     Row Name 08/03/22 1332          Bed Mobility Goal 1 (PT)    Activity/Assistive Device (Bed Mobility Goal 1, PT) bed mobility activities, all  -MS     [place] Level/Cues Needed (Bed Mobility Goal 1, PT) modified independence  -MS     Time Frame (Bed Mobility Goal 1, PT) long term goal (LTG);10 days  -MS     [facility] Name 08/03/22 1332          Transfer Goal 1 (PT)    Activity/Assistive Device (Transfer Goal 1, PT) transfers, all;sit-to-stand/stand-to-sit  -MS     [place] Level/Cues Needed (Transfer Goal 1, PT) standby assist  -MS     Time Frame (Transfer Goal 1, PT) long term goal (LTG);10 days  -MS     Row Name 08/03/22 1332          Gait Training Goal 1 (PT)    Activity/Assistive Device (Gait Training Goal 1, PT) gait (walking locomotion);assistive device use  -MS     [place] Level (Gait Training Goal 1, PT) standby assist  -MS     Distance (Gait Training Goal 1, PT) 150 ft  -MS     Time Frame (Gait Training Goal 1, PT) long term goal (LTG);10 days  -MS     Row Name 08/03/22 1332          Patient Education Goal (PT)    Activity (Patient Education Goal, PT) ther exer x15 reps ea  -MS     [place]/Cues/Accuracy (Memory Goal 2, PT) demonstrates adequately  -MS     Time Frame (Patient Education Goal, PT) long term goal (LTG);10 days  -MS     Row Name 08/03/22 1332          Therapy  Assessment/Plan (PT)    Planned Therapy Interventions (PT) balance training;bed mobility training;gait training;home exercise program;ROM (range of motion);stair training;strengthening;patient/family education;transfer training  -MS           User Key  (r) = Recorded By, (t) = Taken By, (c) = Cosigned By    Initials Name Provider Type    MS Teodoro Johnson, PT Physical Therapist               Clinical Impression     Row Name 08/03/22 1329          Pain    Pretreatment Pain Rating 0/10 - no pain  -MS     Posttreatment Pain Rating 0/10 - no pain  -MS     Row Name 08/03/22 1329          Plan of Care Review    Plan of Care Reviewed With patient  -MS     Progress no change  -MS     Outcome Evaluation Pt seen for Pt eval this date. Pt transferred to EOB with SBA. Pt was able to stand with Jamaal and take steps over to chair 3 ft with Rwx. Pt did lose his balance with short distance gait requiring assist to correct. Pt is expected to benefit from skilled PTx during this inpatient stay.  -MS     Row Name 08/03/22 1322          Therapy Assessment/Plan (PT)    Patient/Family Therapy Goals Statement (PT) to go home  -MS     Rehab Potential (PT) good, to achieve stated therapy goals  -MS     Criteria for Skilled Interventions Met (PT) yes;meets criteria  -MS     Therapy Frequency (PT) 5 times/wk  -MS     Row Name 08/03/22 1326          Vital Signs    Pretreatment Heart Rate (beats/min) 80  -MS     Posttreatment Heart Rate (beats/min) 72  -MS     Pre SpO2 (%) 99  -MS     O2 Delivery Pre Treatment hi-flow  10L  -MS     Intra SpO2 (%) 92  -MS     O2 Delivery Intra Treatment hi-flow  10L  -MS     Post SpO2 (%) 100  -MS     O2 Delivery Post Treatment hi-flow  8L  -MS     Pre Patient Position Supine  -MS     Intra Patient Position Standing  -MS     Post Patient Position Sitting  -MS     Row Name 08/03/22 1324          Positioning and Restraints    Pre-Treatment Position in bed  -MS     Post Treatment Position chair  -MS     In Chair  reclined;call light within reach;encouraged to call for assist;exit alarm on  -MS           User Key  (r) = Recorded By, (t) = Taken By, (c) = Cosigned By    Initials Name Provider Type    Teodoro Phillips, KARSON Physical Therapist               Outcome Measures     Row Name 08/03/22 1333          How much help from another person do you currently need...    Turning from your back to your side while in flat bed without using bedrails? 4  -MS     Moving from lying on back to sitting on the side of a flat bed without bedrails? 4  -MS     Moving to and from a bed to a chair (including a wheelchair)? 3  -MS     Standing up from a chair using your arms (e.g., wheelchair, bedside chair)? 3  -MS     Climbing 3-5 steps with a railing? 2  -MS     To walk in hospital room? 2  -MS     AM-PAC 6 Clicks Score (PT) 18  -MS     Highest level of mobility 6 --> Walked 10 steps or more  -MS     Row Name 08/03/22 1333 08/03/22 1236       Functional Assessment    Outcome Measure Options AM-PAC 6 Clicks Basic Mobility (PT)  -MS AM-PAC 6 Clicks Daily Activity (OT)  -SD          User Key  (r) = Recorded By, (t) = Taken By, (c) = Cosigned By    Initials Name Provider Type    Jenifer Castle OT Occupational Therapist    Teodoro Phillips, PT Physical Therapist                             Physical Therapy Education                 Title: PT OT SLP Therapies (In Progress)     Topic: Physical Therapy (In Progress)     Point: Mobility training (Done)     Learning Progress Summary           Patient Acceptance, E, VU by MS at 8/3/2022 1334    Comment: importance of mobility                   Point: Home exercise program (Done)     Learning Progress Summary           Patient Acceptance, E, VU by MS at 8/3/2022 1334    Comment: importance of mobility                   Point: Body mechanics (Not Started)     Learner Progress:  Not documented in this visit.          Point: Precautions (Not Started)     Learner Progress:  Not documented in this  visit.                      User Key     Initials Effective Dates Name Provider Type Discipline    MS 07/01/22 -  Teodoro Johnson PT Physical Therapist PT              PT Recommendation and Plan  Planned Therapy Interventions (PT): balance training, bed mobility training, gait training, home exercise program, ROM (range of motion), stair training, strengthening, patient/family education, transfer training  Plan of Care Reviewed With: patient  Progress: no change  Outcome Evaluation: Pt seen for Pt eval this date. Pt transferred to EOB with SBA. Pt was able to stand with Jamaal and take steps over to chair 3 ft with Rwx. Pt did lose his balance with short distance gait requiring assist to correct. Pt is expected to benefit from skilled PTx during this inpatient stay.     Time Calculation:    PT Charges     Row Name 08/03/22 1345             Time Calculation    Start Time 1020  -MS      PT Received On 08/03/22  -MS      PT Goal Re-Cert Due Date 08/13/22  -MS              Untimed Charges    PT Eval/Re-eval Minutes 45  -MS              Total Minutes    Untimed Charges Total Minutes 45  -MS       Total Minutes 45  -MS            User Key  (r) = Recorded By, (t) = Taken By, (c) = Cosigned By    Initials Name Provider Type    MS Teodoro Johnson PT Physical Therapist              Therapy Charges for Today     Code Description Service Date Service Provider Modifiers Qty    [id number] HC PT EVAL LOW COMPLEXITY 3 8/3/2022 Teodoro Johnson PT GP 1          PT G-Codes  Outcome Measure Options: AM-PAC 6 Clicks Basic Mobility (PT)  AM-PAC 6 Clicks Score (PT): 18  AM-PAC 6 Clicks Score (OT): 15    Teodoro Johnson PT  8/3/2022

## 2022-08-03 NOTE — THERAPY EVALUATION
Patient Name: Gene Mobley  : 3/14/1933    MRN: [id number]                              Today's Date: 8/3/2022       Admit Date: 2022    Visit Dx:     ICD-10-CM ICD-9-CM   1. Acute respiratory failure with hypoxia (HCC)  J96.01 518.81   2. Multifocal pneumonia  J18.9 486     Patient Active Problem List   Diagnosis   • GOMEZ (acute kidney injury) (HCC)   • Acute respiratory failure with hypoxia (HCC)   • Essential hypertension   • Bronchitis, not specified as acute or chronic   • Type 2 diabetes mellitus without complications (HCC)   • Benign prostatic hyperplasia without lower urinary tract symptoms   • Sepsis due to pneumonia (HCC)   • Pneumonia of both lower lobes due to infectious organism     Past Medical History:   Diagnosis Date   • Diabetes mellitus (HCC)    • Hypertension    • Impaired functional mobility, balance, gait, and endurance      Past Surgical History:   Procedure Laterality Date   • HERNIA REPAIR        General Information     Row Name 22 1227          OT Time and Intention    Document Type evaluation  -SD     Mode of Treatment occupational therapy  -SD     Row Name 22 1227          General Information    Patient Profile Reviewed yes  -SD     Prior Level of Function independent:;all household mobility;transfer;w/c or scooter;min assist:;ADL's  Since having Covid patient has mainly used WC for mobility, uses walker for short distances. Can perform ADLs with minimal assistance from his wife. Patient has a std walker, SC and BSC as well as home O2  -SD     Existing Precautions/Restrictions fall;oxygen therapy device and L/min  -SD     Barriers to Rehab medically complex;previous functional deficit  -SD     Row Name 22 1227          Living Environment    People in Home spouse  -SD     Row Name 22 1227          Home Main Entrance    Number of Stairs, Main Entrance none  -SD     Row Name 22 1227          Stairs Within Home, Primary    Stairs, Within Home, Primary ramp  entrance  -SD     Row Name 08/03/22 1227          Cognition    Orientation Status (Cognition) oriented x 4  -SD     Row Name 08/03/22 1227          Safety Issues, Functional Mobility    Safety Issues Affecting Function (Mobility) safety precautions follow-through/compliance;safety precaution awareness;sequencing abilities;positioning of assistive device  -SD     Impairments Affecting Function (Mobility) balance;endurance/activity tolerance;shortness of breath;strength  -SD           User Key  (r) = Recorded By, (t) = Taken By, (c) = Cosigned By    Initials Name Provider Type    SD Jenifer Natarajan OT Occupational Therapist                 Mobility/ADL's     Row Name 08/03/22 1230          Bed Mobility    Bed Mobility supine-sit  -SD     Supine-Sit [place] (Bed Mobility) standby assist  -SD     Assistive Device (Bed Mobility) bed rails;head of bed elevated  -SD     Row Name 08/03/22 1230          Transfers    Transfers sit-stand transfer  -SD     Sit-Stand [place] (Transfers) minimum assist (75% patient effort)  -SD     Row Name 08/03/22 1230          Sit-Stand Transfer    Assistive Device (Sit-Stand Transfers) walker, front-wheeled  -SD     Row Name 08/03/22 1230          Functional Mobility    Functional Mobility- Ind. Level minimum assist (75% patient effort)  -SD     Functional Mobility- Device walker, front-wheeled  -SD     Functional Mobility-Distance (Feet) 3  -SD     Functional Mobility- Safety Issues balance decreased during turns;sequencing ability decreased;step length decreased;weight-shifting ability decreased  -SD     Row Name 08/03/22 1230          Activities of Daily Living    BADL Assessment/Intervention bathing;upper body dressing;lower body dressing;grooming;feeding;toileting  -SD     Row Name 08/03/22 1230          Bathing Assessment/Intervention    [place] Level (Bathing) moderate assist (50% patient effort)  -SD     Row Name 08/03/22 1230          Upper Body Dressing  Assessment/Training    [place] Level (Upper Body Dressing) contact guard assist  -SD     Row Name 08/03/22 1230          Lower Body Dressing Assessment/Training    [place] Level (Lower Body Dressing) moderate assist (50% patient effort)  -SD     Row Name 08/03/22 1230          Grooming Assessment/Training    [place] Level (Grooming) minimum assist (75% patient effort)  -SD     Row Name 08/03/22 1230          Self-Feeding Assessment/Training    [place] Level (Feeding) supervision  -SD     Comment, (Feeding) encouraged small meals or boost drinks d/t dyspnea  -SD     Row Name 08/03/22 1230          Toileting Assessment/Training    [place] Level (Toileting) moderate assist (50% patient effort)  -SD     Assistive Devices (Toileting) urinal;commode, bedside without drop arms  -SD           User Key  (r) = Recorded By, (t) = Taken By, (c) = Cosigned By    Initials Name Provider Type    Jenifer Castle OT Occupational Therapist               Obj/Interventions     Row Name 08/03/22 1232          Range of Motion Comprehensive    General Range of Motion bilateral upper extremity ROM WFL  -SD     Row Name 08/03/22 1232          Strength Comprehensive (MMT)    General Manual Muscle Testing (MMT) Assessment upper extremity strength deficits identified  -SD     Comment, General Manual Muscle Testing (MMT) Assessment UB 4-/5  -SD           User Key  (r) = Recorded By, (t) = Taken By, (c) = Cosigned By    Initials Name Provider Type    Jenifer Castle OT Occupational Therapist               Goals/Plan     Row Name 08/03/22 1235          Transfer Goal 1 (OT)    Activity/Assistive Device (Transfer Goal 1, OT) sit-to-stand/stand-to-sit;walker, rolling  -SD     [place] Level/Cues Needed (Transfer Goal 1, OT) contact guard required  -SD     Time Frame (Transfer Goal 1, OT) long term goal (LTG)  -SD     Progress/Outcome (Transfer Goal 1, OT) goal ongoing  -SD     Row Name 08/03/22 1235           Dressing Goal 1 (OT)    Activity/Device (Dressing Goal 1, OT) lower body dressing  -SD     [place]/Cues Needed (Dressing Goal 1, OT) minimum assist (75% or more patient effort)  -SD     Time Frame (Dressing Goal 1, OT) 2 weeks  -SD     Progress/Outcome (Dressing Goal 1, OT) goal ongoing  -SD     Row Name 08/03/22 1235          Toileting Goal 1 (OT)    Activity/Device (Toileting Goal 1, OT) toileting skills, all;commode, bedside without drop arms  -SD     [place] Level/Cues Needed (Toileting Goal 1, OT) minimum assist (75% or more patient effort)  -SD     Time Frame (Toileting Goal 1, OT) 2 weeks  -SD     Progress/Outcome (Toileting Goal 1, OT) goal ongoing  -SD     Row Name 08/03/22 1235          Strength Goal 1 (OT)    Strength Goal 1 (OT) Patient to perform UB ther ex as tolerated  -SD     Time Frame (Strength Goal 1, OT) long term goal (LTG)  -SD     Progress/Outcome (Strength Goal 1, OT) goal ongoing  -SD     Row Name 08/03/22 1235          Therapy Assessment/Plan (OT)    Planned Therapy Interventions (OT) activity tolerance training;adaptive equipment training;BADL retraining;patient/caregiver education/training;ROM/therapeutic exercise;strengthening exercise;transfer/mobility retraining  -SD           User Key  (r) = Recorded By, (t) = Taken By, (c) = Cosigned By    Initials Name Provider Type    Jenifer Castle OT Occupational Therapist               Clinical Impression     Row Name 08/03/22 1232          Pain Assessment    Pretreatment Pain Rating 0/10 - no pain  -SD     Posttreatment Pain Rating 0/10 - no pain  -SD     Row Name 08/03/22 1232          Plan of Care Review    Plan of Care Reviewed With patient  -SD     Progress no change  -SD     Outcome Evaluation OT eval completed. Patient presents deficits in strength, endurance, balance, mobility and ADL performance. Patient completed bed mobility with SBA, tf and functional mobility using RW with min A, patient requires mod A overall for  ADLs. Patient on 10L hi flow NC ranged 99, 92, 100. Patient decreased to 8L and notified RN. Patient is expected to benefit from continued OT services priorto DC and plans to return home with wife assisting  services.  -SD     Row Name 08/03/22 1232          Therapy Assessment/Plan (OT)    Patient/Family Therapy Goal Statement (OT) return home  -SD     Rehab Potential (OT) good, to achieve stated therapy goals  -SD     Criteria for Skilled Therapeutic Interventions Met (OT) skilled treatment is necessary  -SD     Therapy Frequency (OT) 3 times/wk  5 times if indicated  -SD     Row Name 08/03/22 1232          Therapy Plan Review/Discharge Plan (OT)    Anticipated Discharge Disposition (OT) home with assist;home with home health  -SD     Row Name 08/03/22 1232          Vital Signs    Pretreatment Heart Rate (beats/min) 80  -SD     Posttreatment Heart Rate (beats/min) 72  -SD     Pre SpO2 (%) 99  -SD     O2 Delivery Pre Treatment hi-flow  10L  -SD     Intra SpO2 (%) 92  -SD     O2 Delivery Intra Treatment hi-flow  -SD     Post SpO2 (%) 100  -SD     O2 Delivery Post Treatment hi-flow  8L  -SD     Row Name 08/03/22 1232          Positioning and Restraints    Pre-Treatment Position in bed  -SD     Post Treatment Position chair  -SD     In Chair reclined;call light within reach;encouraged to call for assist;notified nsg  -SD           User Key  (r) = Recorded By, (t) = Taken By, (c) = Cosigned By    Initials Name Provider Type    Jenifer Castle OT Occupational Therapist               Outcome Measures     Row Name 08/03/22 1236          How much help from another is currently needed...    Putting on and taking off regular lower body clothing? 2  -SD     Bathing (including washing, rinsing, and drying) 2  -SD     Toileting (which includes using toilet bed pan or urinal) 2  -SD     Putting on and taking off regular upper body clothing 3  -SD     Taking care of personal grooming (such as brushing teeth) 3  -SD      Eating meals 3  -SD     AM-PAC 6 Clicks Score (OT) 15  -SD     Row Name 08/03/22 1236          Functional Assessment    Outcome Measure Options AM-PAC 6 Clicks Daily Activity (OT)  -SD           User Key  (r) = Recorded By, (t) = Taken By, (c) = Cosigned By    Initials Name Provider Type    SD Jenifer Natarajan OT Occupational Therapist                Occupational Therapy Education                 Title: PT OT SLP Therapies (In Progress)     Topic: Occupational Therapy (In Progress)     Point: ADL training (Done)     Description:   Instruct learner(s) on proper safety adaptation and remediation techniques during self care or transfers.   Instruct in proper use of assistive devices.              Learning Progress Summary           Patient Acceptance, E,TB, VU by SD at 8/3/2022 1237    Comment: Benefit of OT; OT POC                   Point: Home exercise program (Not Started)     Description:   Instruct learner(s) on appropriate technique for monitoring, assisting and/or progressing therapeutic exercises/activities.              Learner Progress:  Not documented in this visit.          Point: Precautions (Not Started)     Description:   Instruct learner(s) on prescribed precautions during self-care and functional transfers.              Learner Progress:  Not documented in this visit.          Point: Body mechanics (Not Started)     Description:   Instruct learner(s) on proper positioning and spine alignment during self-care, functional mobility activities and/or exercises.              Learner Progress:  Not documented in this visit.                      User Key     Initials Effective Dates Name Provider Type Discipline    SD 06/16/21 -  Jenifer Natarajan OT Occupational Therapist OT              OT Recommendation and Plan  Planned Therapy Interventions (OT): activity tolerance training, adaptive equipment training, BADL retraining, patient/caregiver education/training, ROM/therapeutic exercise, strengthening  exercise, transfer/mobility retraining  Therapy Frequency (OT): 3 times/wk (5 times if indicated)  Plan of Care Review  Plan of Care Reviewed With: patient  Progress: no change  Outcome Evaluation: OT eval completed. Patient presents deficits in strength, endurance, balance, mobility and ADL performance. Patient completed bed mobility with SBA, tf and functional mobility using RW with min A, patient requires mod A overall for ADLs. Patient on 10L hi flow NC ranged 99, 92, 100. Patient decreased to 8L and notified RN. Patient is expected to benefit from continued OT services priorto DC and plans to return home with wife assisting  services.     Time Calculation:    Time Calculation- OT     Row Name 08/03/22 1237             Time Calculation- OT    OT Start Time 1021  -SD      OT Received On 08/03/22  -SD      OT Goal Re-Cert Due Date 08/15/22  -SD              Untimed Charges    OT Eval/Re-eval Minutes 45  -SD              Total Minutes    Untimed Charges Total Minutes 45  -SD       Total Minutes 45  -SD            User Key  (r) = Recorded By, (t) = Taken By, (c) = Cosigned By    Initials Name Provider Type    Jenifer Castle OT Occupational Therapist              Therapy Charges for Today     Code Description Service Date Service Provider Modifiers Qty    [id number]  OT EVAL LOW COMPLEXITY 3 8/3/2022 Jenifer Natarajan OT GO 1               Jenifer Natarajan OT  8/3/2022

## 2022-08-03 NOTE — THERAPY EVALUATION
Acute Care - Speech Language Pathology   Swallow Initial Evaluation  [place]     Patient Name: Gene Mobley  : 3/14/1933  MRN: [id number]  Today's Date: 8/3/2022               Admit Date: 2022    Visit Dx:     ICD-10-CM ICD-9-CM   1. Acute respiratory failure with hypoxia (HCC)  J96.01 518.81   2. Multifocal pneumonia  J18.9 486     Patient Active Problem List   Diagnosis   • OGMEZ (acute kidney injury) (HCC)   • Acute respiratory failure with hypoxia (HCC)   • Essential hypertension   • Bronchitis, not specified as acute or chronic   • Type 2 diabetes mellitus without complications (HCC)   • Benign prostatic hyperplasia without lower urinary tract symptoms   • Sepsis due to pneumonia (HCC)   • Pneumonia of both lower lobes due to infectious organism     Past Medical History:   Diagnosis Date   • Diabetes mellitus (HCC)    • Hypertension    • Impaired functional mobility, balance, gait, and endurance      Past Surgical History:   Procedure Laterality Date   • HERNIA REPAIR         SLP Recommendation and Plan  SLP Swallowing Diagnosis: functional oral phase, functional pharyngeal phase, esophageal dysphagia (22)  SLP Diet Recommendation: regular textures, thin liquids, nutritional supplements needed (22)  Recommended Precautions and Strategies: upright posture during/after eating, reflux precautions, general aspiration precautions (22)  SLP Rec. for Method of Medication Administration: meds whole, with thin liquids, as tolerated (22)     Monitor for Signs of Aspiration: yes, notify SLP if any concerns, gurgly voice, throat clearing, right lower lobe infiltrates, pneumonia (22)  Recommended Diagnostics: No further SLP services recommended (22)  Swallow Criteria for Skilled Therapeutic Interventions Met: no problems identified which require skilled intervention (22)  Anticipated Discharge Disposition (SLP): unknown (22)      Therapy Frequency (Swallow): evaluation only (08/03/22 1040)                                     Outcome Evaluation: Bedside eval of swallow completed with pt. seated upright in chair for po trials.  Pt. denied dysphagia but voiced concern regarding decreased appetite and reduced po intake as a result.  Oral mech was WFL.  No overt s/s aspiration with any trial or consistency.  He did exhibit intermittent belching post swallows and reported occasional heartburn.  MD may wish to consider meds for reflux as appropriate.  Recommend:  1. continue regular diet with thin liq as duarte,  2. meds whole with thin liq as duarte,  3. aspiration precautions, 4. reflux precautions, 5. nutritional supplements would benefit pt. for reduced po intake to improve nutritional support.      SWALLOW EVALUATION (last 72 hours)     SLP Adult Swallow Evaluation     Row Name 08/03/22 1040                   Rehab Evaluation    Document Type evaluation  -TM        Subjective Information complains of  decreased po intake due to disinterest/ no appetitie  -TM        Patient Observations alert;cooperative  -TM        Patient/Family/Caregiver Comments/Observations no family present  -TM        Patient Effort excellent  -TM                  General Information    Patient Profile Reviewed yes  -TM        Pertinent History Of Current Problem DMII, hx COVID  -TM        Current Method of Nutrition regular textures;thin liquids  -TM        Precautions/Limitations, Vision WFL  -TM        Precautions/Limitations, Hearing hearing impairment, bilaterally  -TM        Prior Level of Function-Communication WFL  -TM        Prior Level of Function-Swallowing no diet consistency restrictions  -TM        Plans/Goals Discussed with patient;other (see comments)  RN  -TM        Barriers to Rehab none identified  -TM                  Pain    Additional Documentation Pain Scale: Numbers Pre/Post-Treatment (Group)  -TM                  Pain Scale: Numbers  Pre/Post-Treatment    Pretreatment Pain Rating 0/10 - no pain  -TM        Posttreatment Pain Rating 0/10 - no pain  -TM                  Oral Motor Structure and Function    Oral Lesions or Structural Abnormalities and/or variants none identified  -TM        Dentition Assessment natural, present and adequate  -TM        Secretion Management WNL/WFL  -TM        Mucosal Quality moist, healthy  -TM        Volitional Cough WFL  -TM                  Oral Musculature and Cranial Nerve Assessment    Oral Motor General Assessment WFL  -TM                  General Eating/Swallowing Observations    Respiratory Support Currently in Use nasal cannula  -TM        Eating/Swallowing Skills fed by SLP;self-fed  -TM        Positioning During Eating upright in chair  -TM        Utensils Used spoon;straw  -TM        Consistencies Trialed regular textures;pureed;thin liquids  -TM        Pre SpO2 (%) 100  -TM        Post SpO2 (%) 99  -TM                  Respiratory    Respiratory Status WFL;during swallowing/eating  -TM                  Clinical Swallow Eval    Oral Prep Phase WFL  -TM        Oral Transit WFL  -TM        Oral Residue WFL  -TM        Pharyngeal Phase no overt signs/symptoms of pharyngeal impairment  -TM        Esophageal Phase suspected esophageal impairment  -TM        Clinical Swallow Evaluation Summary Bedside eval of swallow completed with pt. seated upright in chair for po trials.  Pt. denied dysphagia but voiced concern regarding decreased appetite and reduced po intake as a result.  Oral mech was WFL.  No overt s/s aspiration with any trial or consistency.  He did exhibit intermittent belching post swallows and reported occasional heartburn.  MD may wish to consider meds for reflux as appropriate.  Recommend:  1. continue regular diet with thin liq as duarte,  2. meds whole with thin liq as duarte,  3. aspiration precautions, 4. reflux precautions, 5. nutritional supplements would benefit pt. for reduced po intake to  improve nutritional support.  -                  Esophageal Phase Concerns    Esophageal Phase Concerns belching;other (see comments)  c/o occasional heartburn  -                  SLP Evaluation Clinical Impression    SLP Swallowing Diagnosis functional oral phase;functional pharyngeal phase;esophageal dysphagia  -        Functional Impact risk of malnutrition;risk of dehydration  -        Swallow Criteria for Skilled Therapeutic Interventions Met no problems identified which require skilled intervention  -                  Recommendations    Therapy Frequency (Swallow) evaluation only  -        SLP Diet Recommendation regular textures;thin liquids;nutritional supplements needed  -        Recommended Diagnostics No further SLP services recommended  -        Recommended Precautions and Strategies upright posture during/after eating;reflux precautions;general aspiration precautions  -        Oral Care Recommendations Oral Care BID/PRN;Toothbrush  -        SLP Rec. for Method of Medication Administration meds whole;with thin liquids;as tolerated  -        Monitor for Signs of Aspiration yes;notify SLP if any concerns;gurgly voice;throat clearing;right lower lobe infiltrates;pneumonia  -        Anticipated Discharge Disposition (SLP) unknown  -              User Key  (r) = Recorded By, (t) = Taken By, (c) = Cosigned By    Initials Name Effective Dates     Kathy Farfan 06/16/21 -                 EDUCATION  The patient has been educated in the following areas:   Dysphagia (Swallowing Impairment) Oral Care/Hydration.              Time Calculation:    Time Calculation- SLP     Row Name 08/03/22 1320             Time Calculation- SLP    SLP Start Time 1040  -      SLP Received On 08/03/22  -              Untimed Charges    SLP Eval/Re-eval  ST Eval Oral Pharyng Swallow - 36402  -            User Key  (r) = Recorded By, (t) = Taken By, (c) = Cosigned By    Initials Name Provider Type      Kathy Farfan Speech and Language Pathologist                Therapy Charges for Today     Code Description Service Date Service Provider Modifiers Qty    [id number] HC ST EVAL ORAL PHARYNG SWALLOW 4 8/3/2022 Kathy Farfan GN 1               Kathy Farfan  8/3/2022

## 2022-08-03 NOTE — NURSING NOTE
Seen for wound consult. Noted to have unstageable pressure injury to left heel. Reports has been using betadine dressings at home. Recommend Paint left heel with betadine, leave betadine pad on wound, place dry gauze, wrap with rolled gauze daily. Needs to keep heels floated off of bed. Has spent majority of day in chair and has good bed mobility. Floating heels will be safer than using boots since staff reports patient stands at bedside to urinate multiple times per shift. Discussed care with patient and nurse [place].   Recommendations for care include a turning schedule, barrier cream twice daily and prn after cleansing, float heels off bed with pillows, encourage increase mobility as appropriate and tolerated to reduce pressure, for dry skin apply lotion/cream and a nutrition consult for dietary needs. Staff to contact provider and re-consult wound nurse for new skin issues or lack of improvement with current recommendations. Thank you for the consult. If you have questions or concerns do not hesitate to contact me.

## 2022-08-03 NOTE — PLAN OF CARE
Goal Outcome Evaluation:      Out of the bed to chair,verbalized breathing improved while sitting,patient on 5L humidified high flow nasal cannula ,O2 sat is 94 to 96%.in sitting position.

## 2022-08-03 NOTE — CASE MANAGEMENT/SOCIAL WORK
Discharge Planning Assessment  [facility]     Patient Name: Gene Mobley  MRN: [id number]  Today's Date: 8/3/2022    Admit Date: 8/2/2022     Discharge Needs Assessment     Row Name 08/03/22 1200       Living Environment    People in Home spouse    Name(s) of People in Home Melissa spouse    Current Living Arrangements home    Potentially Unsafe Housing Conditions --  no issues    Primary Care Provided by spouse/significant other    Family Caregiver if Needed spouse    Family Caregiver Names Leon       Resource/Environmental Concerns    Transportation Concerns none       Transition Planning    Patient/Family Anticipates Transition to home with family    Patient/Family Anticipated Services at Transition home health care    Transportation Anticipated family or friend will provide       Discharge Needs Assessment    Readmission Within the Last 30 Days no previous admission in last 30 days    Current Outpatient/Agency/Support Group homecare agency    Equipment Currently Used at Home walker, rolling;wheelchair;commode;oxygen    Concerns to be Addressed no discharge needs identified    Outpatient/Agency/Support Group Needs homecare agency    Discharge Facility/Level of Care Needs home with home health    Patient's Choice of Community Agency(s) has On license of [facility]       and uses [employer] for oxygen               Discharge Plan     Row Name 08/03/22 1204       Plan    Plan home with [facility] of home health    Plan Comments dependent on others for care,  able to feed self, some bathing, uses a wheelchair at home, lives with spouse   Melissa Mobley [phone],  wife says they have difficluty getting  him  in the car or van  to take him to appoitments,   states patient had covid in May went to rehab   but is still very weak,  patient is very hard of hearing   wife states he refuses to wear hearing aides,   CM was having difficulty   undersstanding patient  pt gave permission to call spouse,  has directives on file,    oxygen 3 liters  continoulsy through [employer],   [facility] and wants to continue.   Plans returning home at discharge will need resumption of home care ordered.  If patient needs   greater than 5 liters of oxygen at discharge will need  new order and a larger concentrator.   [facility]              Continued Care and Services - Admitted Since 8/2/2022    Coordination has not been started for this encounter.     Selected Continued Care - Prior Encounters Includes selections from prior encounters from 5/4/2022 to 8/3/2022    Discharged on 6/14/2022 Admission date: 5/25/2022 - Discharge disposition: Skilled Nursing Facility (DC - External)    Destination     Service Provider Selected Services Address Phone Fax Patient Preferred    [facility]  [facility] [address] [facility] [id number] [phone] --       Internal Comment last updated by Veronique Horn RN 6/13/2022 1001    They will look at referral                                Expected Discharge Date and Time     Expected Discharge Date Expected Discharge Time    Aug 6, 2022          Demographic Summary     Row Name 08/03/22 1158       General Information    Admission Type inpatient    Referral Source admission list    Preferred Language English               Functional Status     Row Name 08/03/22 1159       Functional Status    Usual Activity Tolerance fair    Current Activity Tolerance poor       Functional Status, IADL    Medications completely dependent    Meal Preparation completely dependent    Housekeeping completely dependent    Laundry completely dependent    Shopping completely dependent       Employment/    Employment Status retired               Psychosocial    No documentation.                Abuse/Neglect    No documentation.                Legal     Row Name 08/03/22 1200       Financial/Legal    Source of Income social security     Financial/Environmental Concerns --  no issues               Substance Abuse    No documentation.                Patient Forms    No documentation.                   Veronique Chan RN

## 2022-08-03 NOTE — PLAN OF CARE
Goal Outcome Evaluation:  Plan of Care Reviewed With: patient        Progress: no change  Outcome Evaluation: OT eval completed. Patient presents deficits in strength, endurance, balance, mobility and ADL performance. Patient completed bed mobility with SBA, tf and functional mobility using RW with min A, patient requires mod A overall for ADLs. Patient on 10L hi flow NC ranged 99, 92, 100. Patient decreased to 8L and notified RN. Patient is expected to benefit from continued OT services priorto DC and plans to return home with wife assisting HH services.

## 2022-08-03 NOTE — PROGRESS NOTES
"Adult Nutrition  Assessment/PES    Patient Name:  Gene Mobley  YOB: 1933  MRN: [id number]  Admit Date:  8/2/2022    Assessment Date:  8/3/2022    Comments:    1.Continue current diet order as medically appropriate.   2. Encourage PO intake to meet 50% of estimated needs.   3. RD to order Boost Glucose Control TID.  4. Consider a MVI w/ minerals daily.   5. Monitor and replace electrolytes PRN.     RD to follow-up and available PRN.      Reason for Assessment     Row Name 08/03/22 1338          Reason for Assessment    Reason For Assessment diagnosis/disease state;identified at risk by screening criteria;per organizational policy     Diagnosis diabetes diagnosis/complications     Identified At Risk by Screening Criteria need for education                  Anthropometrics     Row Name 08/03/22 1340          Anthropometrics    Age for Calculations 89     Height for Calculation 1.753 m (5' 9\")     Weight for Calculation 95.3 kg (210 lb)                Labs/Tests/Procedures/Meds     Row Name 08/03/22 1339          Labs/Procedures/Meds    Lab Results Reviewed reviewed, pertinent     Lab Results Comments High: glucsoe and BUN            Medications    Pertinent Medications Reviewed reviewed, pertinent     Pertinent Medications Comments lovenox, insulin, MVI w/ minerals daily, probiotic, docusate                Physical Findings     Row Name 08/03/22 1340          Physical Findings    Overall Physical Appearance overwt for age                Estimated/Assessed Needs - Anthropometrics     Row Name 08/03/22 1340          Anthropometrics    Age for Calculations 89     Height for Calculation 1.753 m (5' 9\")     Weight for Calculation 95.3 kg (210 lb)            Estimated/Assessed Needs    Additional Documentation Estimated Calorie Needs (Group);LaMoure-St. Jeor Equation (Group);Fluid Requirements (Group);Protein Requirements (Group);KCAL/KG (Group)            Estimated Calorie Needs    Estimated Calorie " Requirement (kcal/day) 1905            KCAL/KG    KCAL/KG 20 Kcal/Kg (kcal)     20 Kcal/Kg (kcal) 1905.1            Valmora-St. Jeor Equation    Valmora-St. Jeor Activity Factors 1.4 - 1.5     Activity Factors (Valmora-St. Jeor) 0 - 0            Protein Requirements    Weight Used For Protein Calculations 95.3 kg (210 lb)     Est Protein Requirement Amount (gms/kg) 1.0 gm protein     Estimated Protein Requirements (gms/day) 95.26            Fluid Requirements    Fluid Requirements (mL/day) 1905  1ml/kcal     RDA Method (mL) 1905                Nutrition Prescription Ordered     Row Name 08/03/22 1342          Nutrition Prescription PO    Current PO Diet Regular     Common Modifiers Consistent Carbohydrate;Cardiac                Evaluation of Received Nutrient/Fluid Intake     Row Name 08/03/22 1342          PO Evaluation    Number of Days PO Intake Evaluated 1 day     Number of Meals 2     % PO Intake 63                     Problem/Interventions:   Problem 1     Row Name 08/03/22 1342          Nutrition Diagnoses Problem 1    Problem 1 Predicted Suboptimal Intake     Etiology (related to) Other (comment)  reported decreased PO intake     Signs/Symptoms (evidenced by) Other (comment)  need for oral nutrition supplement to encourage intake.                      Intervention Goal     Row Name 08/03/22 1341          Intervention Goal    General Meet nutritional needs for age/condition;Maintain nutrition;Disease management/therapy;Provide information regarding MNT for treatment/condition;Reduce/improve symptoms;Improved nutrition related lab(s)     PO Establish PO;Tolerate PO;Increase intake;Maintain intake;Meet estimated needs     Weight Maintain weight                Nutrition Intervention     Row Name 08/03/22 1346          Nutrition Intervention    RD/Tech Action Follow Tx progress;Care plan reviewd;Encourage intake;Recommend/ordered     Recommended/Ordered Supplement;Snack                Nutrition Prescription      Row Name 08/03/22 1344          Nutrition Prescription PO    PO Prescription Begin/change supplement     Supplement Boost Glucose Control     Supplement Frequency 3 times a day;Snack time     New PO Prescription Ordered? Yes            Other Orders    Obtain Weight Daily     Obtain Weight Ordered? No, recommended     Supplement Vitamin mineral supplement     Supplement Ordered? No, recommended     Labs Mg++;Na+;K+     Labs Ordered? No, recommended     Other Monitor and replace electrolytes PRN.                Education/Evaluation     Row Name 08/03/22 1344          Education    Education Will Instruct as appropriate            Monitor/Evaluation    Monitor Per protocol;PO intake;Supplement intake;Pertinent labs;Weight;Skin status;Symptoms                 Electronically signed by:  RUSSEL SWANSON RD  08/03/22 13:51 EDT

## 2022-08-03 NOTE — PROGRESS NOTES
[facility]    Name:  Gene Mobley   Age:  89 y.o.  Sex:  male  :  3/14/1933  MRN:  [id number]   Visit Number:  [id number]  Admission Date:  2022  Date Of Service:  22  Primary Care Physician:  Matt Stack MD     LOS: 1 day :    Chief Complaint:      Shortness of breath.    Subjective:    Mr. Mobley was seen and examined this morning.  Unfortunately he is feeling worse today with increased requirement of FiO2.  He was given a dose of IV Lasix without any significant improvement.  Later in the day patient's FiO2 was trended down to 4 L but he does require higher oxygen levels on minimal exertion.  One of his blood cultures did come back positive for Enterococcus species.  He is currently on cefepime and vancomycin antibiotics therapy since admission.    Hospital Course:    Mr. Mobley is an 89-year-old male with history of diabetes mellitus type 2 on insulin, hypertension was brought to the emergency room by EMS for respiratory distress.Patient was recently admitted to this hospital on 2022 and was discharged on 2022 and at that time he was treated for COVID-19 pneumonia initially requiring high flow oxygen and was subsequently discharged home on 3 L of nasal cannula oxygen.     In the emergency room, he was afebrile with a heart rate of 101, respiratory to 28, blood pressure in the 190s systolic and a pulse oxygen saturation of 94% on simple facemask at 10 L.  Initial ABG on facemask showed a pH of 7.41, PCO2 41, PO2 of 72 and bicarb of 26.  Initial troponin was was 0.029 with subsequent at 0.067 and lactic acid was 3.3 with repeat at 2.1.  Urine analysis within normal limits.  CMP was remarkable for a glucose of 272 but otherwise unremarkable.  WBC was 18 with a hemoglobin of 9.  Lipase and procalcitonin were within normal limits.  proBNP was 1469.  Chest x-ray on my read showed bilateral nonhomogenous patchy opacities seen mid and lower  zone.  CTA of the chest was negative for pulmonary embolism but showed bilateral patchy pneumonia.  Gallbladder with gallstones and wall thickening noted.  Bilateral renal cysts noted.    Patient was admitted to the medical floor and was continued on oxygen, bronchodilators as well as broad-spectrum IV antibiotics therapy with cefepime and vancomycin.  Both his initial blood cultures came back positive for Enterococcus species.  She is  Review of Systems:     All systems were reviewed and negative except as mentioned in subjective, assessment and plan.    Vital Signs:    Temp:  [97.8 °F (36.6 °C)-98.4 °F (36.9 °C)] 98.1 °F (36.7 °C)  Heart Rate:  [70-83] 73  Resp:  [18-20] 18  BP: (121-135)/(43-61) 125/43    Intake and output:    I/O last 3 completed shifts:  In: 740 [P.O.:240; IV Piggyback:500]  Out: 100 [Urine:100]  I/O this shift:  In: 480 [P.O.:480]  Out: 200 [Urine:200]    Physical Examination:    General Appearance:  Alert and cooperative.   Head:  Atraumatic and normocephalic.   Eyes: Conjunctivae and sclerae normal, no icterus. No pallor.   Throat: No oral lesions, no thrush, oral mucosa moist.   Neck: Supple, trachea midline, no thyromegaly.   Lungs:   Breath sounds heard bilaterally equally.  Bilateral basal crackles and scattered wheezing heard. No Pleural rub or bronchial breathing.   Heart:  Normal S1 and S2, no murmur, no gallop, no rub. No JVD.   Abdomen:   Normal bowel sounds, no masses, no organomegaly. Soft, nontender, nondistended, no rebound tenderness.   Extremities: Supple, no edema, no cyanosis, no clubbing.   Skin: No bleeding or rash.   Neurologic: Alert and oriented x 3. No facial asymmetry. Moves all four limbs. No tremors.      Laboratory results:    Results from last 7 days   Lab Units 08/03/22  0604 08/02/22  0309   SODIUM mmol/L 138 136   POTASSIUM mmol/L 5.2 4.6   CHLORIDE mmol/L 103 103   CO2 mmol/L 23.3 21.2*   BUN mg/dL 28* 18   CREATININE mg/dL 1.02 1.00   CALCIUM mg/dL 8.4* 8.8    BILIRUBIN mg/dL  --  0.4   ALK PHOS U/L  --  112   ALT (SGPT) U/L  --  13   AST (SGOT) U/L  --  18   GLUCOSE mg/dL 302* 272*     Results from last 7 days   Lab Units 08/03/22  0604 08/02/22  0309   WBC 10*3/mm3 11.62* 18.02*   HEMOGLOBIN g/dL 7.7* 9.0*   HEMATOCRIT % 25.2* 28.4*   PLATELETS 10*3/mm3 255 270         Results from last 7 days   Lab Units 08/03/22  0604 08/02/22  0535 08/02/22  0309   TROPONIN T ng/mL 0.087* 0.067* 0.029     Results from last 7 days   Lab Units 08/02/22  0355 08/02/22  0335   BLOODCX  Growth present, too young to evaluate Growth present, too young to evaluate     Results from last 7 days   Lab Units 08/02/22  0401   PH, ARTERIAL pH units 7.410   PO2 ART mm Hg 71.9*   PCO2, ARTERIAL mm Hg 40.8   HCO3 ART mmol/L 25.8     I have reviewed the patient's laboratory results.    Radiology results:    XR Chest 1 View    Result Date: 8/2/2022  PROCEDURE: XR CHEST 1 VW-  HISTORY: Chest pain. Hypertension.  COMPARISON:  07/21/2022  FINDINGS:  Portable view of the chest demonstrates diffuse increased pulmonary opacities, significantly progressed from prior exam. Findings may be due to pneumonia or pulmonary edema given symmetry of disease. There is no evidence of effusion, pneumothorax or other significant pleural disease. The mediastinum is unremarkable.  The heart size is borderline enlarged but stable..      Impression: New bilateral airspace opacities which could be due to pneumonia or edema. Correlate with clinical presentation.  This report was signed and finalized on 8/2/2022 9:05 AM by Tucker Norris MD.    CT Angiogram Chest Pulmonary Embolism    Result Date: 8/2/2022  FINAL REPORT TECHNIQUE: Then section axial CT images of the chest were obtained with contrast.  Three-D reformatted images were also obtained.This study was performed with techniques to keep radiation doses as low as reasonably achievable (ALARA). Individualized dose reduction techniques using automated exposure control or  adjustment of mA and/or kV according to the patient''s size were employed. CLINICAL HISTORY: Pulmonary embolism (PE) suspected, unknown D-dimer FINDINGS: The lower lobe branches are suboptimally visualized due to motion artifact. There is no evidence of pulmonary embolism. There is no evidence of thoracic aortic aneurysm or dissection. There are several mildly enlarged mediastinal and hilar lymph nodes that are nonspecific and could be reactive.  There are bilateral pulmonary opacities worrisome for pneumonia.  There are small to moderate pleural effusions.  There is left pleural calcification.    Limited images of the upper abdomen demonstrate multiple gallstones in the gallbladder with gallbladder wall thickening.  There is a 20 mm mass in the right kidney that does not appear to represent a simple cyst and is favored to represent a complex cyst.  There are other probable complex renal cysts bilaterally.     Impression: Suboptimal visualization of the lower lobe branches.  No evidence of pulmonary embolism.    Bilateral pulmonary opacities worrisome for bilateral pneumonia with small to moderate bilateral pleural effusions.    Cholelithiasis with gallbladder wall thickening.    Renal masses favoring complex cysts.  If indicated, renal mass protocol CT could further evaluate. Mildly enlarged mediastinal and hilar lymph nodes could be reactive. Authenticated and Electronically Signed by Jair Benitez III, MD on 08/02/2022 06:48:38 AM    I have reviewed the patient's radiology reports.    Medication Review:     I have reviewed the patient's active and prn medications.     Problem List:      Pneumonia of both lower lobes due to infectious organism    Acute respiratory failure with hypoxia (HCC)    Essential hypertension    Type 2 diabetes mellitus without complications (HCC)    Sepsis due to pneumonia (HCC)    Assessment:    1. Acute on chronic hypoxic respiratory failure, POA.  2. Bilateral healthcare associated  pneumonia with enterococcal bacteremia, POA.  3. Diabetes mellitus type 2.  4. Essential hypertension.    Plan:    Respiratory failure/bilateral pneumonia.  - Continue nonrebreather mask with 10 L of oxygen.  His baseline home oxygen is around 3 L.  - Continue broad-spectrum IV antibiotics therapy with cefepime and vancomycin.  - Blood cultures growing Enterococcus species, sensitivities pending.  - He will be maintained on bronchodilators, Pulmicort and mucolytic agents.  - Nasal swab for MRSA is negative.     Diabetes mellitus type 2/hypertension.  - We will place him on subcutaneous insulin protocol for coverage.  - Continue home dose of Levemir therapy.  - Continue antihypertensive medications.  - Hemoglobin A1c 8.4 on 5/26/2022.    Continue physical and occupational therapy.    Discussed with nursing staff and multidisciplinary team.    DVT Prophylaxis: Lovenox  Code Status: DNR/DNI  Diet: Cardiac diabetic  Discharge Plan: Pending-anticipate several more days in the hospital.    Abdoulaye Alcantara MD  08/03/22  16:36 EDT    Dictated utilizing Dragon dictation.

## 2022-08-03 NOTE — PLAN OF CARE
Goal Outcome Evaluation:              Outcome Evaluation: Bedside eval of swallow completed with pt. seated upright in chair for po trials.  Pt. denied dysphagia but voiced concern regarding decreased appetite and reduced po intake as a result.  Oral mech was WFL.  No overt s/s aspiration with any trial or consistency.  He did exhibit intermittent belching post swallows and reported occasional heartburn.  MD may wish to consider meds for reflux as appropriate.  Recommend:  1. continue regular diet with thin liq as duarte,  2. meds whole with thin liq as duarte,  3. aspiration precautions, 4. reflux precautions, 5. nutritional supplements would benefit pt. for reduced po intake to improve nutritional support.

## 2022-08-04 NOTE — PLAN OF CARE
Goal Outcome Evaluation:  Plan of Care Reviewed With: patient        Progress: no change  Outcome Evaluation: Patient sat up in chair a few hours today. IV antibiotics running this afternoon. No acute events occurred this shift. VSS, will continue to monitor.

## 2022-08-04 NOTE — PLAN OF CARE
Goal Outcome Evaluation:  Plan of Care Reviewed With: patient        Progress: improving  Outcome Evaluation: OT tx completed. Patient sitting in chair, on 6L HF NC satting at 98%. Patient completed sit to stand tf x 5 with CGA and VCs. using RW. Patient dropped to 91%. Patient completed grooming tasks with SBA followed by UB/trunk ROM. Patient S/U with lunch tray, had returned to 94% following activity. Continue OT POC

## 2022-08-04 NOTE — CASE MANAGEMENT/SOCIAL WORK
Case Management/Social Work    Patient Name:  Gene Mobley  YOB: 1933  MRN: [id number]  Admit Date:  8/2/2022        15:15 EDT SW continuing to follow. Not medically ready for d/c at this time. Current plan for d/c is to return home with HH services. Pt was followed by [facility] prior to admission. Pt has home O2 through [employer], and used 3L at baseline.       Electronically signed by:  EVERETTE Cabrera  08/04/22 15:14 EDT

## 2022-08-04 NOTE — PLAN OF CARE
"Goal Outcome Evaluation:  Plan of Care Reviewed With: patient        Progress: improving  Outcome Evaluation: Pt seen for PTx this date. Pt transferred to EOB with CGA. After transfer Pt's O2 sats dropped to 88% on 5.5L. Pt rested and was able to improve to 90%. Pt then transferred to the chair with Rwx 3ft and CGA. After transfer Pt's O2 sats were 88%, however after ~30\", Pt's O2 sats began to drop to 85%. Pt improved back to 93%. Pt then performed B LE ther exer sitting in chair. Pt progressing, cont per POC  "

## 2022-08-04 NOTE — PROGRESS NOTES
[facility]    Name:  Gene Mobley   Age:  89 y.o.  Sex:  male  :  3/14/1933  MRN:  [id number]   Visit Number:  [id number]  Admission Date:  2022  Date Of Service:  22  Primary Care Physician:  Matt Stack MD     LOS: 2 days :    Chief Complaint:      Shortness of breath.    Subjective:    Mr. Mobley was seen and examined this morning.  He is feeling much better today and is eager to walk in the hallway with physical therapy.  He is currently down to 4 L of nasal cannula oxygen and saturating in the mid 90s.  Does have some coughing and minimal shortness of breath on exertion.  Denies any fevers, abdominal pain, nausea or vomiting.    Hospital Course:    Mr. Mobley is an 89-year-old male with history of diabetes mellitus type 2 on insulin, hypertension was brought to the emergency room by EMS for respiratory distress.Patient was recently admitted to this hospital on 2022 and was discharged on 2022 and at that time he was treated for COVID-19 pneumonia initially requiring high flow oxygen and was subsequently discharged home on 3 L of nasal cannula oxygen.     In the emergency room, he was afebrile with a heart rate of 101, respiratory to 28, blood pressure in the 190s systolic and a pulse oxygen saturation of 94% on simple facemask at 10 L.  Initial ABG on facemask showed a pH of 7.41, PCO2 41, PO2 of 72 and bicarb of 26.  Initial troponin was was 0.029 with subsequent at 0.067 and lactic acid was 3.3 with repeat at 2.1.  Urine analysis within normal limits.  CMP was remarkable for a glucose of 272 but otherwise unremarkable.  WBC was 18 with a hemoglobin of 9.  Lipase and procalcitonin were within normal limits.  proBNP was 1469.  Chest x-ray on my read showed bilateral nonhomogenous patchy opacities seen mid and lower zone.  CTA of the chest was negative for pulmonary embolism but showed bilateral patchy pneumonia.  Gallbladder with  gallstones and wall thickening noted.  Bilateral renal cysts noted.    Patient was admitted to the medical floor and was continued on oxygen, bronchodilators as well as broad-spectrum IV antibiotics therapy with cefepime and vancomycin.  Both his initial blood cultures came back positive for Enterococcus species.    Review of Systems:     All systems were reviewed and negative except as mentioned in subjective, assessment and plan.    Vital Signs:    Temp:  [97.2 °F (36.2 °C)-98.7 °F (37.1 °C)] 98.2 °F (36.8 °C)  Heart Rate:  [73-82] 75  Resp:  [18-20] 20  BP: (116-156)/(43-58) 147/52    Intake and output:    I/O last 3 completed shifts:  In: 960 [P.O.:960]  Out: 725 [Urine:725]  I/O this shift:  In: 480 [P.O.:480]  Out: 300 [Urine:300]    Physical Examination:    General Appearance:  Alert and cooperative.  Comfortable at rest.   Head:  Atraumatic and normocephalic.   Eyes: Conjunctivae and sclerae normal, no icterus. No pallor.   Throat: No oral lesions, no thrush, oral mucosa moist.   Neck: Supple, trachea midline, no thyromegaly.   Lungs:   Breath sounds heard bilaterally equally.  Bilateral basal crackles and scattered wheezing heard. No Pleural rub or bronchial breathing.   Heart:  Normal S1 and S2, no murmur, no gallop, no rub. No JVD.   Abdomen:   Normal bowel sounds, no masses, no organomegaly. Soft, nontender, nondistended, no rebound tenderness.   Extremities: Supple, no edema, no cyanosis, no clubbing.   Skin: No bleeding or rash.   Neurologic: Alert and oriented x 3. No facial asymmetry. Moves all four limbs. No tremors.      Laboratory results:    Results from last 7 days   Lab Units 08/04/22  0553 08/03/22  0604 08/02/22  0309   SODIUM mmol/L 141 138 136   POTASSIUM mmol/L 4.0 5.2 4.6   CHLORIDE mmol/L 105 103 103   CO2 mmol/L 24.3 23.3 21.2*   BUN mg/dL 29* 28* 18   CREATININE mg/dL 0.96 1.02 1.00   CALCIUM mg/dL 8.8 8.4* 8.8   BILIRUBIN mg/dL  --   --  0.4   ALK PHOS U/L  --   --  112   ALT (SGPT)  U/L  --   --  13   AST (SGOT) U/L  --   --  18   GLUCOSE mg/dL 133* 302* 272*     Results from last 7 days   Lab Units 08/04/22  0553 08/03/22  0604 08/02/22  0309   WBC 10*3/mm3 12.33* 11.62* 18.02*   HEMOGLOBIN g/dL 8.0* 7.7* 9.0*   HEMATOCRIT % 25.1* 25.2* 28.4*   PLATELETS 10*3/mm3 274 255 270         Results from last 7 days   Lab Units 08/03/22  0604 08/02/22  0535 08/02/22  0309   TROPONIN T ng/mL 0.087* 0.067* 0.029     Results from last 7 days   Lab Units 08/02/22  0355 08/02/22  0335   BLOODCX  Enterococcus species* Enterococcus species*     Results from last 7 days   Lab Units 08/02/22  0401   PH, ARTERIAL pH units 7.410   PO2 ART mm Hg 71.9*   PCO2, ARTERIAL mm Hg 40.8   HCO3 ART mmol/L 25.8     I have reviewed the patient's laboratory results.    Radiology results:    No radiology results from the last 24 hrs    Medication Review:     I have reviewed the patient's active and prn medications.     Problem List:      Pneumonia of both lower lobes due to infectious organism    Acute respiratory failure with hypoxia (HCC)    Essential hypertension    Type 2 diabetes mellitus without complications (HCC)    Sepsis due to pneumonia (HCC)    Assessment:    1. Acute on chronic hypoxic respiratory failure, POA.  2. Bilateral healthcare associated pneumonia with enterococcal bacteremia, POA.  3. Diabetes mellitus type 2.  4. Essential hypertension.  5. Recent history of COVID-19 pneumonia.    Plan:    Respiratory failure/bilateral pneumonia.  - Continue nasal cannula oxygen at 4 to 5 L.  His baseline home oxygen is around 3 L.  - Continue broad-spectrum IV antibiotics therapy with cefepime and vancomycin (day 2/7).  - Blood cultures growing Enterococcus species, sensitivities pending.  - Continue bronchodilators, Pulmicort and mucolytic agents.  - Nasal swab for MRSA is negative.     Diabetes mellitus type 2/hypertension.  -  Continue Levemir and subcutaneous insulin protocol for coverage.  - Continue  antihypertensive medications.  - Hemoglobin A1c 8.4 on 5/26/2022.    Continue physical and occupational therapy.  Encouraged patient to walk with physical therapy in the hallway today.    Discussed with nursing staff at the bedside and multidisciplinary team.    DVT Prophylaxis: Lovenox  Code Status: DNR/DNI  Diet: Cardiac diabetic  Discharge Plan: Pending-anticipate several more days in the hospital.    Abdoulaye Alcantara MD  08/04/22  11:02 EDT    Dictated utilizing Dragon dictation.

## 2022-08-04 NOTE — THERAPY TREATMENT NOTE
Patient Name: Gene Mobley  : 3/14/1933    MRN: [id number]                              Today's Date: 2022       Admit Date: 2022    Visit Dx:     ICD-10-CM ICD-9-CM   1. Acute respiratory failure with hypoxia (HCC)  J96.01 518.81   2. Multifocal pneumonia  J18.9 486     Patient Active Problem List   Diagnosis   • GOMEZ (acute kidney injury) (HCC)   • Acute respiratory failure with hypoxia (HCC)   • Essential hypertension   • Bronchitis, not specified as acute or chronic   • Type 2 diabetes mellitus without complications (HCC)   • Benign prostatic hyperplasia without lower urinary tract symptoms   • Sepsis due to pneumonia (HCC)   • Pneumonia of both lower lobes due to infectious organism     Past Medical History:   Diagnosis Date   • Diabetes mellitus (HCC)    • Hypertension    • Impaired functional mobility, balance, gait, and endurance      Past Surgical History:   Procedure Laterality Date   • HERNIA REPAIR        General Information     Row Name 22          Physical Therapy Time and Intention    Document Type therapy note (daily note)  -MS     Mode of Treatment physical therapy  -MS     Row Name 22          General Information    Patient Profile Reviewed yes  -MS     Existing Precautions/Restrictions fall;oxygen therapy device and L/min  -MS           User Key  (r) = Recorded By, (t) = Taken By, (c) = Cosigned By    Initials Name Provider Type    MS Teodoro Johnson PT Physical Therapist               Mobility     Row Name 22          Bed Mobility    Bed Mobility supine-sit  -MS     Supine-Sit [place] (Bed Mobility) contact guard  -MS     Assistive Device (Bed Mobility) bed rails;head of bed elevated  -MS     Row Name 22          Bed-Chair Transfer    Bed-Chair [place] (Transfers) minimum assist (75% patient effort)  -MS     Assistive Device (Bed-Chair Transfers) walker, front-wheeled  -MS     Row Name 22 143          Sit-Stand Transfer     Sit-Stand [place] (Transfers) contact guard;verbal cues  -MS     Assistive Device (Sit-Stand Transfers) walker, front-wheeled  -MS     Row Name 08/04/22 1437          Gait/Stairs (Locomotion)    [place] Level (Gait) minimum assist (75% patient effort)  -MS     Assistive Device (Gait) walker, front-wheeled  -MS     Distance in Feet (Gait) 3ft  -MS     Deviations/Abnormal Patterns (Gait) festinating/shuffling;alba decreased;base of support, narrow  -MS     Bilateral Gait Deviations forward flexed posture  -MS           User Key  (r) = Recorded By, (t) = Taken By, (c) = Cosigned By    Initials Name Provider Type    Teodoro Phillips PT Physical Therapist               Obj/Interventions     Row Name 08/04/22 1438          Range of Motion Comprehensive    General Range of Motion bilateral lower extremity ROM WFL  -MS     Row Name 08/04/22 1438          Strength Comprehensive (MMT)    General Manual Muscle Testing (MMT) Assessment lower extremity strength deficits identified  -MS     Row Name 08/04/22 1438          Motor Skills    Therapeutic Exercise hip;knee;ankle  Pt performed B LE ankle pumps, LAQ, hip flexion in sitting x15 reps ea. Pt then performed long sitting hip abd and quad sets x15 reps ea  -MS           User Key  (r) = Recorded By, (t) = Taken By, (c) = Cosigned By    Initials Name Provider Type    Teodoro Phillips PT Physical Therapist               Goals/Plan    No documentation.                Clinical Impression     Row Name 08/04/22 1439          Pain    Pretreatment Pain Rating 0/10 - no pain  -MS     Posttreatment Pain Rating 0/10 - no pain  -MS     Row Name 08/04/22 1439          Plan of Care Review    Plan of Care Reviewed With patient  -MS     Progress improving  -MS     Outcome Evaluation Pt seen for PTx this date. Pt transferred to EOB with CGA. After transfer Pt's O2 sats dropped to 88% on 5.5L. Pt rested and was able to improve to 90%. Pt then transferred to the chair with  "Rwx 3ft and CGA. After transfer Pt's O2 sats were 88%, however after ~30\", Pt's O2 sats began to drop to 85%. Pt improved back to 93%. Pt then performed B LE ther exer sitting in chair. Pt progressing, cont per POC  -MS     Row Name 08/04/22 1439          Vital Signs    Pre Systolic BP Rehab 147  -MS     Pre Treatment Diastolic BP 52  -MS     Pretreatment Heart Rate (beats/min) 82  -MS     Pre SpO2 (%) 93  -MS     O2 Delivery Pre Treatment supplemental O2  -MS     Intra SpO2 (%) 85  -MS     O2 Delivery Intra Treatment supplemental O2  -MS     Post SpO2 (%) 93  -MS     O2 Delivery Post Treatment supplemental O2  -MS     Pre Patient Position Supine  -MS     Intra Patient Position Standing  -MS     Post Patient Position Sitting  -MS     Row Name 08/04/22 1439          Positioning and Restraints    Pre-Treatment Position in bed  -MS     Post Treatment Position chair  -MS     In Chair sitting;call light within reach;encouraged to call for assist;exit alarm on  -MS           User Key  (r) = Recorded By, (t) = Taken By, (c) = Cosigned By    Initials Name Provider Type    Teodoro Phillips, PT Physical Therapist               Outcome Measures     Row Name 08/04/22 1450          How much help from another person do you currently need...    Turning from your back to your side while in flat bed without using bedrails? 4  -MS     Moving from lying on back to sitting on the side of a flat bed without bedrails? 4  -MS     Moving to and from a bed to a chair (including a wheelchair)? 3  -MS     Standing up from a chair using your arms (e.g., wheelchair, bedside chair)? 3  -MS     Climbing 3-5 steps with a railing? 2  -MS     To walk in hospital room? 2  -MS     AM-PAC 6 Clicks Score (PT) 18  -MS     Highest level of mobility 6 --> Walked 10 steps or more  -MS     Row Name 08/04/22 1340          Functional Assessment    Outcome Measure Options AM-PAC 6 Clicks Daily Activity (OT)  -SD           User Key  (r) = Recorded By, (t) = " "Taken By, (c) = Cosigned By    Initials Name Provider Type    Jenifer Castle, OT Occupational Therapist    MS Teodoro Johnson, KARSON Physical Therapist                             Physical Therapy Education                 Title: PT OT SLP Therapies (In Progress)     Topic: Physical Therapy (In Progress)     Point: Mobility training (Done)     Learning Progress Summary           Patient Acceptance, E, VU by MS at 8/3/2022 1334    Comment: importance of mobility                   Point: Home exercise program (Done)     Learning Progress Summary           Patient Acceptance, E, VU by MS at 8/3/2022 1334    Comment: importance of mobility                   Point: Body mechanics (Not Started)     Learner Progress:  Not documented in this visit.          Point: Precautions (Not Started)     Learner Progress:  Not documented in this visit.                      User Key     Initials Effective Dates Name Provider Type Discipline    MS 07/01/22 -  Teodoro Johnson PT Physical Therapist PT              PT Recommendation and Plan  Planned Therapy Interventions (PT): balance training, bed mobility training, gait training, home exercise program, ROM (range of motion), stair training, strengthening, patient/family education, transfer training  Plan of Care Reviewed With: patient  Progress: improving  Outcome Evaluation: Pt seen for PTx this date. Pt transferred to EOB with CGA. After transfer Pt's O2 sats dropped to 88% on 5.5L. Pt rested and was able to improve to 90%. Pt then transferred to the chair with Rwx 3ft and CGA. After transfer Pt's O2 sats were 88%, however after ~30\", Pt's O2 sats began to drop to 85%. Pt improved back to 93%. Pt then performed B LE ther exer sitting in chair. Pt progressing, cont per POC     Time Calculation:    PT Charges     Row Name 08/04/22 0036             Time Calculation    Start Time 1056  -MS      Stop Time 1120  -MS      Time Calculation (min) 24 min  -MS      PT Received On 08/04/22  " -MS              Timed Charges    45776 - PT Therapeutic Exercise Minutes 10  -MS      33623 - PT Therapeutic Activity Minutes 14  -MS              Total Minutes    Timed Charges Total Minutes 24  -MS       Total Minutes 24  -MS            User Key  (r) = Recorded By, (t) = Taken By, (c) = Cosigned By    Initials Name Provider Type    Teodoro Phillips PT Physical Therapist              Therapy Charges for Today     Code Description Service Date Service Provider Modifiers Qty    36247788354 HC PT EVAL LOW COMPLEXITY 3 8/3/2022 Teodoro Johnson, PT GP 1    98811838200 HC PT THER PROC EA 15 MIN 8/4/2022 Teodoro Johnson, PT GP 1    61277253578 HC PT THERAPEUTIC ACT EA 15 MIN 8/4/2022 Teodoro Johnson, PT GP 1          PT G-Codes  Outcome Measure Options: AM-PAC 6 Clicks Daily Activity (OT)  AM-PAC 6 Clicks Score (PT): 18  AM-PAC 6 Clicks Score (OT): 15    Teodoro Johnson PT  8/4/2022

## 2022-08-04 NOTE — PLAN OF CARE
Goal Outcome Evaluation:  Plan of Care Reviewed With: patient        Progress: no change  Outcome Evaluation: VSS; pt titrated up to 10L HF after 2 episodes of SAT dropping with exertion causing labored breathing

## 2022-08-04 NOTE — THERAPY TREATMENT NOTE
Patient Name: Gene Mobley  : 3/14/1933    MRN: [id number]                              Today's Date: 2022       Admit Date: 2022    Visit Dx:     ICD-10-CM ICD-9-CM   1. Acute respiratory failure with hypoxia (HCC)  J96.01 518.81   2. Multifocal pneumonia  J18.9 486     Patient Active Problem List   Diagnosis   • GOMEZ (acute kidney injury) (HCC)   • Acute respiratory failure with hypoxia (HCC)   • Essential hypertension   • Bronchitis, not specified as acute or chronic   • Type 2 diabetes mellitus without complications (HCC)   • Benign prostatic hyperplasia without lower urinary tract symptoms   • Sepsis due to pneumonia (HCC)   • Pneumonia of both lower lobes due to infectious organism     Past Medical History:   Diagnosis Date   • Diabetes mellitus (HCC)    • Hypertension    • Impaired functional mobility, balance, gait, and endurance      Past Surgical History:   Procedure Laterality Date   • HERNIA REPAIR        General Information     Row Name 22          OT Time and Intention    Document Type therapy note (daily note)  -SD     Mode of Treatment occupational therapy  -SD     Row Name 22          General Information    Patient Profile Reviewed yes  -SD           User Key  (r) = Recorded By, (t) = Taken By, (c) = Cosigned By    Initials Name Provider Type    SD Jenifer Natarajan OT Occupational Therapist                 Mobility/ADL's     Row Name 22          Bed Mobility    Comment, (Bed Mobility) in chair  -SD     Row Name 22          Transfers    Transfers sit-stand transfer  -SD     Comment, (Transfers) sts x 5 using RW  -SD     Sit-Stand [place] (Transfers) contact guard;verbal cues  -SD     Row Name 22          Sit-Stand Transfer    Assistive Device (Sit-Stand Transfers) walker, front-wheeled  -SD     Row Name 22          Grooming Assessment/Training    [place] Level (Grooming) hair care, combing/brushing;oral care  regimen;wash face, hands;standby assist  -SD     Position (Grooming) supported sitting  -SD           User Key  (r) = Recorded By, (t) = Taken By, (c) = Cosigned By    Initials Name Provider Type    Jenifer Castle OT Occupational Therapist               Obj/Interventions     Row Name 08/04/22 1337          Shoulder (Therapeutic Exercise)    Shoulder (Therapeutic Exercise) AROM (active range of motion)  -SD     Shoulder AROM (Therapeutic Exercise) bilateral;flexion;extension;aBduction;aDduction;horizontal aBduction/aDduction;15 repititions  -SD     Row Name 08/04/22 1337          Elbow/Forearm (Therapeutic Exercise)    Elbow/Forearm (Therapeutic Exercise) AROM (active range of motion)  -SD     Elbow/Forearm AROM (Therapeutic Exercise) bilateral;flexion;extension;supination;pronation;15 repititions  -SD     [facility] Name 08/04/22 [facility]7          Wrist (Therapeutic Exercise)    Wrist (Therapeutic Exercise) AROM (active range of motion)  -SD     Wrist AROM (Therapeutic Exercise) bilateral;flexion;extension;15 repititions  -SD     Row Name 08/04/22 1337          Hand (Therapeutic Exercise)    Hand (Therapeutic Exercise) AROM (active range of motion)  -SD     Hand AROM/AAROM (Therapeutic Exercise) bilateral;AROM (active range of motion);finger flexion;finger extension;thumb flexion;thumb extension;15 repititions  -SD     [facility] Name 08/04/22 1337          Motor Skills    Therapeutic Exercise shoulder;elbow/forearm;wrist;hand;other (see comments)  trunk rotation left/right x 10 ea direction  -SD           User Key  (r) = Recorded By, (t) = Taken By, (c) = Cosigned By    Initials Name Provider Type    Jenifer Castle OT Occupational Therapist               Goals/Plan    No documentation.                Clinical Impression     Row Name 08/04/22 1338          Pain Assessment    Pretreatment Pain Rating 0/10 - no pain  -SD     Posttreatment Pain Rating 0/10 - no pain  -SD     Row Name 08/04/22 1338          Plan of Care  Review    Plan of Care Reviewed With patient  -SD     Progress improving  -SD     Outcome Evaluation OT tx completed. Patient sitting in chair, on 6L HF NC satting at 98%. Patient completed sit to stand tf x 5 with CGA and VCs. using RW. Patient dropped to 91%. Patient completed grooming tasks with SBA followed by UB/trunk ROM. Patient S/U with lunch tray, had returned to 94% following activity. Continue OT POC  -SD     Row Name 08/04/22 1338          Vital Signs    Pre SpO2 (%) 98  -SD     O2 Delivery Pre Treatment hi-flow  -SD     Intra SpO2 (%) 91  -SD     O2 Delivery Intra Treatment hi-flow  -SD     Post SpO2 (%) 94  -SD     O2 Delivery Post Treatment hi-flow  -SD     Row Name 08/04/22 1338          Positioning and Restraints    Pre-Treatment Position sitting in chair/recliner  -SD     Post Treatment Position chair  -SD     In Chair sitting;call light within reach;encouraged to call for assist;exit alarm on  -SD           User Key  (r) = Recorded By, (t) = Taken By, (c) = Cosigned By    Initials Name Provider Type    Jenifer Castle OT Occupational Therapist               Outcome Measures     Row Name 08/04/22 1340          How much help from another is currently needed...    Putting on and taking off regular lower body clothing? 2  -SD     Bathing (including washing, rinsing, and drying) 2  -SD     Toileting (which includes using toilet bed pan or urinal) 2  -SD     Putting on and taking off regular upper body clothing 3  -SD     Taking care of personal grooming (such as brushing teeth) 3  -SD     Eating meals 3  -SD     AM-PAC 6 Clicks Score (OT) 15  -SD     Row Name 08/04/22 1340          Functional Assessment    Outcome Measure Options AM-PAC 6 Clicks Daily Activity (OT)  -SD           User Key  (r) = Recorded By, (t) = Taken By, (c) = Cosigned By    Initials Name Provider Type    Jenifer Castle OT Occupational Therapist                Occupational Therapy Education                 Title: PT OT  SLP Therapies (In Progress)     Topic: Occupational Therapy (In Progress)     Point: ADL training (Done)     Description:   Instruct learner(s) on proper safety adaptation and remediation techniques during self care or transfers.   Instruct in proper use of assistive devices.              Learning Progress Summary           Patient Acceptance, E,TB, VU by SD at 8/4/2022 1341    Comment: Safety and sequencing during functional tf.    Acceptance, E,TB, VU by SD at 8/3/2022 1237    Comment: Benefit of OT; OT POC                   Point: Home exercise program (Not Started)     Description:   Instruct learner(s) on appropriate technique for monitoring, assisting and/or progressing therapeutic exercises/activities.              Learner Progress:  Not documented in this visit.          Point: Precautions (Not Started)     Description:   Instruct learner(s) on prescribed precautions during self-care and functional transfers.              Learner Progress:  Not documented in this visit.          Point: Body mechanics (Not Started)     Description:   Instruct learner(s) on proper positioning and spine alignment during self-care, functional mobility activities and/or exercises.              Learner Progress:  Not documented in this visit.                      User Key     Initials Effective Dates Name Provider Type Discipline    SD 06/16/21 -  Jenifer Natarajan OT Occupational Therapist OT              OT Recommendation and Plan  Planned Therapy Interventions (OT): activity tolerance training, adaptive equipment training, BADL retraining, patient/caregiver education/training, ROM/therapeutic exercise, strengthening exercise, transfer/mobility retraining  Therapy Frequency (OT): 3 times/wk (5 times if indicated)  Plan of Care Review  Plan of Care Reviewed With: patient  Progress: improving  Outcome Evaluation: OT tx completed. Patient sitting in chair, on 6L HF NC satting at 98%. Patient completed sit to stand tf x 5 with CGA  and VCs. using RW. Patient dropped to 91%. Patient completed grooming tasks with SBA followed by UB/trunk ROM. Patient S/U with lunch tray, had returned to 94% following activity. Continue OT POC     Time Calculation:    Time Calculation- OT     Row Name 08/04/22 1341             Time Calculation- OT    OT Start Time 1200  -SD      OT Stop Time 1228  -SD      OT Time Calculation (min) 28 min  -SD      OT Received On 08/04/22  -SD      OT Goal Re-Cert Due Date 08/15/22  -SD              Timed Charges    17956 - OT Therapeutic Exercise Minutes 9  -SD      95990 - OT Therapeutic Activity Minutes 8  -SD      39546 - OT Self Care/Mgmt Minutes 11  -SD              Total Minutes    Timed Charges Total Minutes 28  -SD       Total Minutes 28  -SD            User Key  (r) = Recorded By, (t) = Taken By, (c) = Cosigned By    Initials Name Provider Type    SD Jenifer Natarajan OT Occupational Therapist              Therapy Charges for Today     Code Description Service Date Service Provider Modifiers Qty    [id number]  OT EVAL LOW COMPLEXITY 3 8/3/2022 Jenifer Natarajan OT GO 1    [id number]  OT THER PROC EA 15 MIN 8/4/2022 Jenifer Natarajan OT GO 1    [id number]  OT SELF CARE/MGMT/TRAIN EA 15 MIN 8/4/2022 Jenifer Natarajan OT GO 1               Jenifer Natarajan OT  8/4/2022

## 2022-08-05 NOTE — PROGRESS NOTES
[facility]    PROGRESS NOTE    Name:  Gene Mobley   Age:  89 y.o.  Sex:  male  :  3/14/1933  MRN:  [id number]   Visit Number:  [id number]  Admission Date:  2022  Date Of Service:  22  Primary Care Physician:  Matt Stack MD     LOS: 3 days :    Chief Complaint:      Shortness of breath.    Subjective:    Mr. Mobley was seen and examined this morning.  He is currently sitting up on the bed and seems to be about the same compared to yesterday with regards to his respiratory status.  He is having cough.  He is complaining of anxiety and according to the nurse sometimes does get into what seems to be panic attack and tachypnea.  He does not take any medication for anxiety at home but would like to have something started.  Denies any chest pain.  No fevers.  Surveillance blood cultures were done this morning.    Hospital Course:    Mr. Mobley is an 89-year-old male with history of diabetes mellitus type 2 on insulin, hypertension was brought to the emergency room by EMS for respiratory distress.Patient was recently admitted to this hospital on 2022 and was discharged on 2022 and at that time he was treated for COVID-19 pneumonia initially requiring high flow oxygen and was subsequently discharged home on 3 L of nasal cannula oxygen.     In the emergency room, he was afebrile with a heart rate of 101, respiratory to 28, blood pressure in the 190s systolic and a pulse oxygen saturation of 94% on simple facemask at 10 L.  Initial ABG on facemask showed a pH of 7.41, PCO2 41, PO2 of 72 and bicarb of 26.  Initial troponin was was 0.029 with subsequent at 0.067 and lactic acid was 3.3 with repeat at 2.1.  Urine analysis within normal limits.  CMP was remarkable for a glucose of 272 but otherwise unremarkable.  WBC was 18 with a hemoglobin of 9.  Lipase and procalcitonin were within normal limits.  proBNP was 1469.  Chest x-ray on my read showed bilateral  nonhomogenous patchy opacities seen mid and lower zone.  CTA of the chest was negative for pulmonary embolism but showed bilateral patchy pneumonia.  Gallbladder with gallstones and wall thickening noted.  Bilateral renal cysts noted.    Patient was admitted to the medical floor and was continued on oxygen, bronchodilators as well as broad-spectrum IV antibiotics therapy with cefepime and vancomycin.  Both his initial blood cultures came back positive for Enterococcus species.  Patient's antibody therapy was changed to ampicillin on 8/4/2022.  The plan was to continue 7 days of IV antibiotics therapy and 7 days of oral.  Surveillance blood cultures were drawn on 8/5/2024.  Due to generalized anxiety, he was started on BuSpar during this hospital stay.    Review of Systems:     All systems were reviewed and negative except as mentioned in subjective, assessment and plan.    Vital Signs:    Temp:  [97.5 °F (36.4 °C)-98.2 °F (36.8 °C)] 97.7 °F (36.5 °C)  Heart Rate:  [75-84] 77  Resp:  [20-24] 22  BP: (140-155)/(46-63) 150/60    Intake and output:    I/O last 3 completed shifts:  In: 960 [P.O.:960]  Out: 1225 [Urine:1225]  I/O this shift:  In: 720 [P.O.:720]  Out: 400 [Urine:400]    Physical Examination:    General Appearance:  Alert and cooperative.  Anxious.   Head:  Atraumatic and normocephalic.   Eyes: Conjunctivae and sclerae normal, no icterus. No pallor.   Throat: No oral lesions, no thrush, oral mucosa moist.   Neck: Supple, trachea midline, no thyromegaly.   Lungs:   Breath sounds heard bilaterally equally.  Bilateral basal crackles and scattered wheezing heard. No Pleural rub or bronchial breathing.   Heart:  Normal S1 and S2, no murmur, no gallop, no rub. No JVD.   Abdomen:   Normal bowel sounds, no masses, no organomegaly. Soft, nontender, nondistended, no rebound tenderness.   Extremities: Supple, no edema, no cyanosis, no clubbing.   Skin: No bleeding or rash.   Neurologic: Alert and oriented x 3. No  facial asymmetry. Moves all four limbs. No tremors.      Laboratory results:    Results from last 7 days   Lab Units 08/04/22  0553 08/03/22  0604 08/02/22  0309   SODIUM mmol/L 141 138 136   POTASSIUM mmol/L 4.0 5.2 4.6   CHLORIDE mmol/L 105 103 103   CO2 mmol/L 24.3 23.3 21.2*   BUN mg/dL 29* 28* 18   CREATININE mg/dL 0.96 1.02 1.00   CALCIUM mg/dL 8.8 8.4* 8.8   BILIRUBIN mg/dL  --   --  0.4   ALK PHOS U/L  --   --  112   ALT (SGPT) U/L  --   --  13   AST (SGOT) U/L  --   --  18   GLUCOSE mg/dL 133* 302* 272*     Results from last 7 days   Lab Units 08/04/22  0553 08/03/22  0604 08/02/22  0309   WBC 10*3/mm3 12.33* 11.62* 18.02*   HEMOGLOBIN g/dL 8.0* 7.7* 9.0*   HEMATOCRIT % 25.1* 25.2* 28.4*   PLATELETS 10*3/mm3 274 255 270         Results from last 7 days   Lab Units 08/03/22  0604 08/02/22  0535 08/02/22  0309   TROPONIN T ng/mL 0.087* 0.067* 0.029     Results from last 7 days   Lab Units 08/02/22  0355 08/02/22  0335   BLOODCX  Enterococcus faecalis* Enterococcus faecalis*     Results from last 7 days   Lab Units 08/02/22  0401   PH, ARTERIAL pH units 7.410   PO2 ART mm Hg 71.9*   PCO2, ARTERIAL mm Hg 40.8   HCO3 ART mmol/L 25.8     I have reviewed the patient's laboratory results.    Radiology results:    No radiology results from the last 24 hrs    Medication Review:     I have reviewed the patient's active and prn medications.     Problem List:      Pneumonia of both lower lobes due to infectious organism    Acute respiratory failure with hypoxia (HCC)    Essential hypertension    Type 2 diabetes mellitus without complications (HCC)    Sepsis due to pneumonia (HCC)    Assessment:    1. Acute on chronic hypoxic respiratory failure, POA.  2. Bilateral healthcare associated pneumonia with enterococcal bacteremia, POA.  3. Diabetes mellitus type 2.  4. Essential hypertension.  5. Recent history of COVID-19 pneumonia.    Plan:    Respiratory failure/bilateral pneumonia.  - Continue nasal cannula oxygen at 4 to  5 L.  His baseline home oxygen is around 3 L.  - Continue broad-spectrum IV antibiotics therapy with ampicillin (day 3/7).  - Blood cultures growing Enterococcus species, sensitivities pending.  - Continue bronchodilators, Pulmicort and mucolytic agents.  - Nasal swab for MRSA is negative.     Diabetes mellitus type 2/hypertension.  - Continue Levemir and subcutaneous insulin protocol for coverage.  - Continue antihypertensive medications.  - Hemoglobin A1c 8.4 on 5/26/2022.     I will start him on BuSpar for his anxiety.  Continue physical and occupational therapy.  Discussed with nursing staff at the bedside and multidisciplinary team.    Discussed with the patient's wife Melissa over the phone today.    DVT Prophylaxis: Lovenox  Code Status: DNR/DNI  Diet: Cardiac diabetic  Discharge Plan: Pending-anticipate several more days in the hospital.    Abdoulaye Alcantara MD  08/05/22  15:40 EDT    Dictated utilizing Dragon dictation.

## 2022-08-05 NOTE — PLAN OF CARE
Goal Outcome Evaluation:  Plan of Care Reviewed With: patient        Progress: no change  Outcome Evaluation: Patient had company this morning and rested some this afternoon. IV antibiotics given. Patient had concerns of anxiety; MD aware and medications ordered and given. No acute events occurred this shift. VSS, will continue to monitor.

## 2022-08-05 NOTE — THERAPY TREATMENT NOTE
Attempted PTx this AM, Pt asked PT to return due to feeling SOA, at that time O2 sats were >90%. PT returned in PM, Pt still complaining of SOA, Pt on 3L O2 sats 91%. Pt assisted with repositioning in bed, with supine positioning Pt's O2 sats dropped to 87% on 3L. Pt maxA x2 for scooting and was placed in chair position with O2 sats returning to 90%. Deferred further activity at this time due to decreased activity tolerance. PT to follow up tomorrow.

## 2022-08-05 NOTE — PLAN OF CARE
Goal Outcome Evaluation:  Plan of Care Reviewed With: patient        Progress: improving  Outcome Evaluation: VSS; continue antibiotics; pt continues to de-SAT with exertion but recovers quickly

## 2022-08-05 NOTE — THERAPY TREATMENT NOTE
Attempted x2 to see pt today.  Pt more soa, but O2 sats remaining >90-91% on 3L O2 via nc.  Pt was repositioned in bed with bed placed in chair position and educated on pursed lip breathing tech.  Pt reports feeling better once in more upright position.

## 2022-08-06 NOTE — THERAPY TREATMENT NOTE
Per pt's nurse hold PT treatment this p.m. due pt having increased difficulty with shortness of breath and increased anxiety with this shortness of breath. Will plan to check back tomorrow and proceed as pt is able to tolerate and as is medically appropriate.

## 2022-08-06 NOTE — PLAN OF CARE
Goal Outcome Evaluation:           Progress: no change  Outcome Evaluation: VSS.  Patient alert and oriented.  Oxygenation maintained on 5L O2 via simple face mask.  Accu-checks as ordered.  IV antibiotics (see MAR) administered as ordered.  Intake and output monitored.  No complaints during shift.  Will continue to monitor.

## 2022-08-06 NOTE — PROGRESS NOTES
[facility]    Name:  Gene Mobley   Age:  89 y.o.  Sex:  male  :  3/14/1933  MRN:  [id number]   Visit Number:  [id number]  Admission Date:  2022  Date Of Service:  22  Primary Care Physician:  Matt Stack MD     LOS: 4 days :    Chief Complaint:      Shortness of breath.    Subjective:    Mr. Mobley was seen and examined this morning.  He states that he is currently feeling better but did have increasing shortness of breath during the night.  He is currently on 6 L of nasal cannula oxygen through the facemask.  He was able to speak in full sentences and was able to answer questions appropriately.  He states that his anxiety seems to be better after initiation of BuSpar yesterday.  Unfortunately surveillance blood cultures done yesterday are still growing gram-positive cocci.  No fevers.    Hospital Course:    Mr. Mobley is an 89-year-old male with history of diabetes mellitus type 2 on insulin, hypertension was brought to the emergency room by EMS for respiratory distress.Patient was recently admitted to this hospital on 2022 and was discharged on 2022 and at that time he was treated for COVID-19 pneumonia initially requiring high flow oxygen and was subsequently discharged home on 3 L of nasal cannula oxygen.     In the emergency room, he was afebrile with a heart rate of 101, respiratory to 28, blood pressure in the 190s systolic and a pulse oxygen saturation of 94% on simple facemask at 10 L.  Initial ABG on facemask showed a pH of 7.41, PCO2 41, PO2 of 72 and bicarb of 26.  Initial troponin was was 0.029 with subsequent at 0.067 and lactic acid was 3.3 with repeat at 2.1.  Urine analysis within normal limits.  CMP was remarkable for a glucose of 272 but otherwise unremarkable.  WBC was 18 with a hemoglobin of 9.  Lipase and procalcitonin were within normal limits.  proBNP was 1469.  Chest x-ray on my read showed bilateral  nonhomogenous patchy opacities seen mid and lower zone.  CTA of the chest was negative for pulmonary embolism but showed bilateral patchy pneumonia.  Gallbladder with gallstones and wall thickening noted.  Bilateral renal cysts noted.    Patient was admitted to the medical floor and was continued on oxygen, bronchodilators as well as broad-spectrum IV antibiotics therapy with cefepime and vancomycin.  Both his initial blood cultures came back positive for Enterococcus species.  Patient's antibody therapy was changed to ampicillin on 8/4/2022.  The plan was to continue 7 days of IV antibiotics therapy and 7 days of oral.  Due to generalized anxiety, he was started on BuSpar during this hospital stay.  Unfortunately, surveillance blood cultures done on 8/5/2022 also came back positive.  A 2D echocardiogram was ordered.    Review of Systems:     All systems were reviewed and negative except as mentioned in subjective, assessment and plan.    Vital Signs:    Temp:  [97.7 °F (36.5 °C)-98.5 °F (36.9 °C)] 98.2 °F (36.8 °C)  Heart Rate:  [77-94] 86  Resp:  [18-24] 22  BP: (141-154)/(52-64) 154/64    Intake and output:    I/O last 3 completed shifts:  In: 3624.9 [P.O.:880; I.V.:2644.9; IV Piggyback:100]  Out: 1000 [Urine:1000]  I/O this shift:  In: 240 [P.O.:240]  Out: -     Physical Examination:    General Appearance:  Alert and cooperative.   Head:  Atraumatic and normocephalic.   Eyes: Conjunctivae and sclerae normal, no icterus. No pallor.   Throat: No oral lesions, no thrush, oral mucosa moist.   Neck: Supple, trachea midline, no thyromegaly.   Lungs:   Breath sounds heard bilaterally equally.  Bilateral basal crackles and scattered wheezing heard. No Pleural rub or bronchial breathing.   Heart:  Normal S1 and S2, no murmur, no gallop, no rub. No JVD.   Abdomen:   Normal bowel sounds, no masses, no organomegaly. Soft, nontender, nondistended, no rebound tenderness.   Extremities: Supple, no edema, no cyanosis, no  clubbing.   Skin: No bleeding or rash.   Neurologic: Alert and oriented x 3. No facial asymmetry. Moves all four limbs but does have generalized weakness. No tremors.      Laboratory results:    Results from last 7 days   Lab Units 08/06/22  0542 08/04/22  0553 08/03/22  0604 08/02/22  0309   SODIUM mmol/L 143 141 138 136   POTASSIUM mmol/L 4.6 4.0 5.2 4.6   CHLORIDE mmol/L 108* 105 103 103   CO2 mmol/L 23.5 24.3 23.3 21.2*   BUN mg/dL 29* 29* 28* 18   CREATININE mg/dL 1.05 0.96 1.02 1.00   CALCIUM mg/dL 8.7 8.8 8.4* 8.8   BILIRUBIN mg/dL  --   --   --  0.4   ALK PHOS U/L  --   --   --  112   ALT (SGPT) U/L  --   --   --  13   AST (SGOT) U/L  --   --   --  18   GLUCOSE mg/dL 173* 133* 302* 272*     Results from last 7 days   Lab Units 08/06/22  0542 08/04/22  0553 08/03/22  0604   WBC 10*3/mm3 12.89* 12.33* 11.62*   HEMOGLOBIN g/dL 9.0* 8.0* 7.7*   HEMATOCRIT % 28.4* 25.1* 25.2*   PLATELETS 10*3/mm3 304 274 255         Results from last 7 days   Lab Units 08/03/22  0604 08/02/22  0535 08/02/22  0309   TROPONIN T ng/mL 0.087* 0.067* 0.029     Results from last 7 days   Lab Units 08/05/22  0609 08/05/22  0601 08/02/22  0355 08/02/22  0335   BLOODCX  Abnormal Stain* Abnormal Stain* Enterococcus faecalis* Enterococcus faecalis*     Results from last 7 days   Lab Units 08/02/22  0401   PH, ARTERIAL pH units 7.410   PO2 ART mm Hg 71.9*   PCO2, ARTERIAL mm Hg 40.8   HCO3 ART mmol/L 25.8     I have reviewed the patient's laboratory results.    Radiology results:    No radiology results from the last 24 hrs    Medication Review:     I have reviewed the patient's active and prn medications.     Problem List:      Pneumonia of both lower lobes due to infectious organism    Acute respiratory failure with hypoxia (HCC)    Essential hypertension    Type 2 diabetes mellitus without complications (HCC)    Sepsis due to pneumonia (HCC)    Assessment:    1. Acute on chronic hypoxic respiratory failure, POA.  2. Bilateral healthcare  associated pneumonia with enterococcal bacteremia, POA.  3. Diabetes mellitus type 2.  4. Essential hypertension.  5. Recent history of COVID-19 pneumonia.  6. Generalized anxiety disorder- initiated on BuSpar.    Plan:    Respiratory failure/bilateral pneumonia.  - Continue nasal cannula oxygen at 6 L.  His baseline home oxygen is around 3 L.  - Continue broad-spectrum IV antibiotics therapy with ampicillin (day 4/7).  - Blood cultures growing Enterococcus species, sensitive to ampicillin and vancomycin.  - Repeat blood cultures done on 8/5/2022 are also positive- speciation pending.  - Continue bronchodilators, Pulmicort and mucolytic agents.  - Nasal swab for MRSA was negative.  - The exact etiology of bacteremia is uncertain but was presumed to be pneumonia.  However, due to persistent blood cultures being positive, I will go ahead and order a 2D echocardiogram to look for valvular abnormalities.     Diabetes mellitus type 2/hypertension.  - Continue Levemir and subcutaneous insulin protocol for coverage.  - Continue antihypertensive medications.  - Hemoglobin A1c 8.4 on 5/26/2022.    Generalized anxiety disorder.  - Initiated on BuSpar during this hospital stay.    Continue physical and occupational therapy.  I have strongly advised him to use the incentive spirometry.    DVT Prophylaxis: Lovenox  Code Status: DNR/DNI  Diet: Cardiac diabetic  Discharge Plan: Pending-anticipate several more days in the hospital.    Abdoulaye Alcantara MD  08/06/22  11:00 EDT    Dictated utilizing Dragon dictation.

## 2022-08-06 NOTE — PROGRESS NOTES
Patient could not tolerate BiPAP at this moment. He keeps pulling mask off his face. Nurse was notified.

## 2022-08-06 NOTE — SIGNIFICANT NOTE
Dr. Miner informed patient's preliminary blood culture with AZRA - left arm result is gram positive cocci in chains.

## 2022-08-06 NOTE — PLAN OF CARE
Goal Outcome Evaluation:  Plan of Care Reviewed With: patient        Progress: declining  Outcome Evaluation: VSS.  Pt requiring 10 L O2 simple face mask.  Pt has CPAP in room but was unabe to tolerate.  Pt still having episodes of anxiety attacks that needs reorienting.  Pt failed bedside dyspahgia screen and diet orders were changed accordingly.  No other changes in pt conditon to report.  Will continue to monitor.

## 2022-08-07 NOTE — NURSING NOTE
Patients oxygen was dropping on the monitor. Staff went in and patient was .  was called and pronounced him at . Family was called and they came to see him, they left with patient belongings consisting of: 2 pair underwear, pants, shirt, handkerchief, slippers, socks, mirror, and razor.  home was called.

## 2022-08-07 NOTE — PROGRESS NOTES
[facility]   FOLLOW UP NOTE    Name:  Gene Mobley   Age:  89 y.o.  Sex:  male  :  3/14/1933  MRN:  [id number]   Visit Number:  [id number]  Admission Date:  2022  Date Of Service:  22  Primary Care Physician:  Matt Stack MD    Patient was seen and examined. Pertinent laboratory and radiology data were reviewed.    Vital signs:    Vital Signs (last 24 hours)        0700  0659  2150   Most Recent      Temp (°F) 97.7 -  98.5    98.2 -  98.5     98.5 (36.9) 1949    Heart Rate 77 -  94    80 -  99     87 1949    Resp  -      /61 -  150/60    141/53 -  154/64     141/53 1949    SpO2 (%) 88 -  95    87 -  95     95 1949          Was notified by nursing staff that patient had been found in his room unresponsive and a rapid response was called.  He was being monitored with continuous pulse ox and this was very low at time of their arrival.  Per report, patient had been increasingly anxious throughout the day, more shortness of breath, requiring higher oxygen requirement.  Patient did have confirmed CODE STATUS of DNR/DNI.  Upon my evaluation, patient remained unresponsive.    Physical exam:  General appearance: Unresponsive  Cardiac: No cardiac sounds auscultated after 1 minute  Respiratory: No breath sounds auscultated after 1 minute  Vascular: No palpable peripheral pulses, no palpable carotid or femoral pulse  Neuro: Pupils fixed, nonreactive, no withdraws to painful or verbal stimuli    I pronounced patient  at  with nursing staff at bedside.  I did attempt to contact patient's spouse multiple times, was unable to.  I did contact the next emergency contact, which was his daughter Betty.  I did relate to her the events of the patient's passing, condolences given.  She told me she would go to her mother's house and notify her, as spouse is hard of hearing and  likely asleep.  House supervisor notified.    Kim Miner,   08/06/22  21:50 EDT    Dictated utilizing Dragon dictation.

## 2022-08-07 NOTE — DISCHARGE SUMMARY
[facility]   DEATH SUMMARY      Name:  Gene Mobley   Age:  89 y.o.  Sex:  male  :  3/14/1933  MRN:  [id number]   Visit Number:  [id number]    Admission Date:  2022  Date and Time of Death:  Date and Time of Death: 2022 at 9:19 PM  Primary Care Physician:  Matt Stack MD    Final Diagnoses:     1. Acute on chronic hypoxic respiratory failure, POA.  2. Bilateral healthcare associated pneumonia with enterococcal bacteremia, POA.  3. Diabetes mellitus type 2.  4. Essential hypertension.  5. Recent history of COVID-19 pneumonia.  6. Generalized anxiety disorder- initiated on BuSpar.    Problem List:     Active Hospital Problems    Diagnosis  POA   • **Pneumonia of both lower lobes due to infectious organism [J18.9]  Yes   • Sepsis due to pneumonia (HCC) [J18.9, A41.9]  Yes   • Type 2 diabetes mellitus without complications (HCC) [E11.9]  Yes   • Acute respiratory failure with hypoxia (HCC) [J96.01]  Yes   • Essential hypertension [I10]  Yes      Resolved Hospital Problems   No resolved problems to display.     Presenting Problem:    Chief Complaint   Patient presents with   • Shortness of Breath      Consults:     Consulting Physician(s)             None          Procedures Performed:    None.    History of presenting illness/Hospital Course:    Mr. Mobley is an 89-year-old male with history of diabetes mellitus type 2 on insulin, hypertension was brought to the emergency room by EMS for respiratory distress.Patient was recently admitted to this hospital on 2022 and was discharged on 2022 and at that time he was treated for COVID-19 pneumonia initially requiring high flow oxygen and was subsequently discharged home on 3 L of nasal cannula oxygen.     In the emergency room, he was afebrile with a heart rate of 101, respiratory to 28, blood pressure in the 190s systolic and a pulse oxygen saturation of 94% on simple facemask at 10 L.  Initial ABG on facemask  showed a pH of 7.41, PCO2 41, PO2 of 72 and bicarb of 26.  Initial troponin was was 0.029 with subsequent at 0.067 and lactic acid was 3.3 with repeat at 2.1.  Urine analysis within normal limits.  CMP was remarkable for a glucose of 272 but otherwise unremarkable.  WBC was 18 with a hemoglobin of 9.  Lipase and procalcitonin were within normal limits.  proBNP was 1469.  Chest x-ray on my read showed bilateral nonhomogenous patchy opacities seen mid and lower zone.  CTA of the chest was negative for pulmonary embolism but showed bilateral patchy pneumonia.  Gallbladder with gallstones and wall thickening noted.  Bilateral renal cysts noted.     Patient was admitted to the medical floor and was continued on oxygen, bronchodilators as well as broad-spectrum IV antibiotics therapy with cefepime and vancomycin.  Both his initial blood cultures came back positive for Enterococcus species.  Patient's antibody therapy was changed to ampicillin on 8/4/2022.  The plan was to continue 7 days of IV antibiotics therapy and 7 days of oral.  Due to generalized anxiety, he was started on BuSpar during this hospital stay.  Unfortunately, surveillance blood cultures done on 8/5/2022 also came back positive.  A 2D echocardiogram was ordered to rule out infective endocarditis.    On 8/6/2022, patient became more anxious with intermittent tachypnea.  He was on 10 L of oxygen with facemask.  ABG was fairly unremarkable except for mild hypoxia with a pH of 7.42, PCO2 41, PO2 67 and bicarb of 26.  Patient was placed on BiPAP therapy but unfortunately was not able to tolerate it.  He was placed on Benadryl along with BuSpar for his anxiety.  Unfortunately, patient was found to be unresponsive late in the evening.  He had orders for DNR/DNI.  When the nighttime physician arrived in the room patient was unresponsive with no pulse.  Patient was subsequently pronounced dead at 2119 on 8/6/2022.  Patient's daughter was informed.    Physical  Exam:    Patient was declared dead at 2119 on 8/6/2022 by the nighttime hospitalist.    Pertinent Lab Results:     Results from last 7 days   Lab Units 08/06/22  0542 08/04/22  0553 08/03/22  0604 08/02/22  0309   SODIUM mmol/L 143 141 138 136   POTASSIUM mmol/L 4.6 4.0 5.2 4.6   CHLORIDE mmol/L 108* 105 103 103   CO2 mmol/L 23.5 24.3 23.3 21.2*   BUN mg/dL 29* 29* 28* 18   CREATININE mg/dL 1.05 0.96 1.02 1.00   CALCIUM mg/dL 8.7 8.8 8.4* 8.8   BILIRUBIN mg/dL  --   --   --  0.4   ALK PHOS U/L  --   --   --  112   ALT (SGPT) U/L  --   --   --  13   AST (SGOT) U/L  --   --   --  18   GLUCOSE mg/dL 173* 133* 302* 272*     Results from last 7 days   Lab Units 08/06/22  0542 08/04/22  0553 08/03/22  0604   WBC 10*3/mm3 12.89* 12.33* 11.62*   HEMOGLOBIN g/dL 9.0* 8.0* 7.7*   HEMATOCRIT % 28.4* 25.1* 25.2*   PLATELETS 10*3/mm3 304 274 255         Results from last 7 days   Lab Units 08/03/22  0604 08/02/22  0535 08/02/22  0309   TROPONIN T ng/mL 0.087* 0.067* 0.029     Results from last 7 days   Lab Units 08/02/22  0309   PROBNP pg/mL 1,469.0         Results from last 7 days   Lab Units 08/02/22  0309   LIPASE U/L 20     Results from last 7 days   Lab Units 08/06/22  1322   PH, ARTERIAL pH units 7.415   PO2 ART mm Hg 66.9*   PCO2, ARTERIAL mm Hg 41.0   HCO3 ART mmol/L 26.3     Results from last 7 days   Lab Units 08/05/22  0609 08/05/22  0601 08/02/22  0355 08/02/22  0335   BLOODCX  Enterococcus species* Gram Positive Cocci* Enterococcus faecalis* Enterococcus faecalis*     Pertinent Radiology Results:    Imaging Results (All)     Procedure Component Value Units Date/Time    XR Chest 1 View [[id number]] Collected: 08/06/22 1334     Updated: 08/06/22 1337    Narrative:      PROCEDURE: XR CHEST 1 VW-     HISTORY: Shortness of breath, follow-up pneumonia; J96.01-Acute  respiratory failure with hypoxia; J18.9-Pneumonia, unspecified organism     COMPARISON: 08/02/2022.     FINDINGS: The heart is normal in size. The mediastinum  is unremarkable.  Bilateral opacities and effusions, slightly worse compared to previous.  Correlate for pneumonia. There is no pneumothorax.  There are no acute  osseous abnormalities.       Impression:      Bilateral opacities and effusions, slightly worse compared  to previous. Correlate for pneumonia.     Continued followup is recommended.     This report was signed and finalized on 8/6/2022 1:35 PM by Kamar Ruvalcaba DO.    XR Chest 1 View [[id number]] Collected: 08/02/22 0900     Updated: 08/02/22 0907    Narrative:      PROCEDURE: XR CHEST 1 VW-     HISTORY: Chest pain. Hypertension.     COMPARISON:  07/21/2022     FINDINGS:  Portable view of the chest demonstrates diffuse increased  pulmonary opacities, significantly progressed from prior exam. Findings  may be due to pneumonia or pulmonary edema given symmetry of disease.  There is no evidence of effusion, pneumothorax or other significant  pleural disease. The mediastinum is unremarkable.     The heart size is borderline enlarged but stable..       Impression:      New bilateral airspace opacities which could be due to  pneumonia or edema. Correlate with clinical presentation.      This report was signed and finalized on 8/2/2022 9:05 AM by Tucker Norris MD.    CT Angiogram Chest Pulmonary Embolism [[id number]] Collected: 08/02/22 0648     Updated: 08/02/22 0649    Narrative:      FINAL REPORT    TECHNIQUE:  Then section axial CT images of the chest were obtained with  contrast.  Three-D reformatted images were also obtained.This  study was performed with techniques to keep radiation doses as  low as reasonably achievable (ALARA). Individualized dose  reduction techniques using automated exposure control or  adjustment of mA and/or kV according to the patient''s size were  employed.    CLINICAL HISTORY:  Pulmonary embolism (PE) suspected, unknown D-dimer    FINDINGS:  The lower lobe branches are suboptimally visualized due to  motion artifact. There is no  evidence of pulmonary embolism.  There is no evidence of thoracic aortic aneurysm or dissection.  There are several mildly enlarged mediastinal and hilar lymph  nodes that are nonspecific and could be reactive.  There are  bilateral pulmonary opacities worrisome for pneumonia.  There  are small to moderate pleural effusions.  There is left pleural  calcification.    Limited images of the upper abdomen  demonstrate multiple gallstones in the gallbladder with  gallbladder wall thickening.  There is a 20 mm mass in the right  kidney that does not appear to represent a simple cyst and is  favored to represent a complex cyst.  There are other probable  complex renal cysts bilaterally.      Impression:      Suboptimal visualization of the lower lobe branches.  No  evidence of pulmonary embolism.    Bilateral pulmonary opacities  worrisome for bilateral pneumonia with small to moderate  bilateral pleural effusions.    Cholelithiasis with gallbladder  wall thickening.    Renal masses favoring complex cysts.  If  indicated, renal mass protocol CT could further evaluate.  Mildly enlarged mediastinal and hilar lymph nodes could be  reactive.    Authenticated and Electronically Signed by Jair Benitez III, MD on 08/02/2022 06:48:38 AM        Echo:    Results for orders placed during the hospital encounter of 05/25/22    Adult Transthoracic Echo Complete W/ Cont if Necessary Per Protocol    Interpretation Summary  1.  Normal left ventricular size and systolic function, LVEF 65-70%.  2.  Mild concentric LVH.  3.  Grade 1 diastolic dysfunction.  4.  Normal right ventricular size and systolic function.  5.  Mildly increased left atrial volume index.  6.  Mild calcification of aortic valve without significant stenosis.    Code status during the hospital stay:    Code Status and Medical Interventions:   Ordered at: 08/02/22 1350     Medical Intervention Limits:    NO intubation (DNI)    NO cardioversion     Level Of Support  Discussed With:    Patient    Health Care Surrogate     Code Status (Patient has no pulse and is not breathing):    No CPR (Do Not Attempt to Resuscitate)     Medical Interventions (Patient has pulse or is breathing):    Limited Support     Discharge Disposition:        Test Results Pending at Discharge:    Pending Labs     Order Current Status    Blood Culture With ROSALIND - Blood, Arm, Left Preliminary result    Blood Culture With ROSALIND - Blood, Hand, Right Preliminary result           Abdoulaye Alcantara MD  22  09:26 EDT    Time: I spent 25 minutes on this discharge activity which included: face-to-face encounter with the patient and/or family, reviewing the data in the system, coordination of the care with the nursing staff as well as consultants, documentation, and entering orders.     Dictated utilizing Dragon dictation.

## 2022-08-08 LAB
BACTERIA SPEC AEROBE CULT: ABNORMAL
BACTERIA SPEC AEROBE CULT: ABNORMAL
GRAM STN SPEC: ABNORMAL
ISOLATED FROM: ABNORMAL
ISOLATED FROM: ABNORMAL
QT INTERVAL: 452 MS
QTC INTERVAL: 501 MS

## 2022-08-08 NOTE — CASE MANAGEMENT/SOCIAL WORK
Case Management Discharge Note                Selected Continued Care - Discharged on 2022 Admission date: 2022 - Discharge disposition:     Destination    No services have been selected for the patient.              Durable Medical Equipment    No services have been selected for the patient.              Dialysis/Infusion    No services have been selected for the patient.              Home Medical Care    No services have been selected for the patient.              Therapy    No services have been selected for the patient.              Community Resources    No services have been selected for the patient.              Community & DME    No services have been selected for the patient.                Selected Continued Care - Prior Encounters Includes selections from prior encounters from 2022 to 2022    Discharged on 2022 Admission date: 2022 - Discharge disposition: Skilled Nursing Facility (DC - External)    Destination     Service Provider Selected Services Address Phone Fax Patient Preferred    [facility]  Skilled Nursing [address] [facility] [id number] [phone] --       Internal Comment last updated by Veronique Horn, RN 2022 1001    They will look at referral                                     Final Discharge Disposition Code: 20 -